# Patient Record
Sex: MALE | Race: WHITE
[De-identification: names, ages, dates, MRNs, and addresses within clinical notes are randomized per-mention and may not be internally consistent; named-entity substitution may affect disease eponyms.]

---

## 2018-01-30 ENCOUNTER — HOSPITAL ENCOUNTER (OUTPATIENT)
Dept: HOSPITAL 25 - OREAST | Age: 63
Discharge: HOME | End: 2018-01-30
Attending: OPHTHALMOLOGY
Payer: COMMERCIAL

## 2018-01-30 VITALS — DIASTOLIC BLOOD PRESSURE: 79 MMHG | SYSTOLIC BLOOD PRESSURE: 142 MMHG

## 2018-01-30 DIAGNOSIS — H25.12: Primary | ICD-10-CM

## 2018-01-30 DIAGNOSIS — I10: ICD-10-CM

## 2018-01-30 DIAGNOSIS — Z87.891: ICD-10-CM

## 2018-01-30 DIAGNOSIS — E78.5: ICD-10-CM

## 2018-01-30 PROCEDURE — V2632 POST CHMBR INTRAOCULAR LENS: HCPCS

## 2018-01-30 NOTE — OP
DATE OF OPERATION/DATE OF DICTATION:  01/30/2018 - [facility]

 

YOB: 1955.

 

SURGEON:  Dr. David Navarro.

 

ASSISTANT:  None.

 

ANESTHESIA:  Topical with intravenous sedation.

 

PRE-OP DIAGNOSIS:  Cataract, left eye.

 

POST-OP DIAGNOSIS:  Cataract, left eye.

 

OPERATIVE PROCEDURE:  Phacoemulsification and cataract extraction with 
posterior chamber intraocular lens implant, left eye.

 

COMPLICATIONS:  None.

 

BLOOD LOSS:  None.

 

DESCRIPTION OF PROCEDURE:  The patient was brought to the operating room and 
received a small amount of intravenous sedation.  A drop of Tetracaine was 
placed in his left eye.  He was prepped and draped in the usual sterile fashion 
for ophthalmic surgery and attention was directed to the left eye where a 
speculum was placed.  A paracentesis was created at the 5 o'clock position and 
0.1 cc of 1 percent preservative-free Lidocaine was injected into the anterior 
chamber followed by DisCoVisc. The eye was digitally stabilized while a 2.75 mm 
keratome was used to create a triplanar clear corneal incision at the 3 o'clock 
position.  A continuous curvilinear capsulorrhexis was created with a cystotome 
and Utrata forceps.  BSS on a cannula was used to hydrodissect the lens from 
the capsule.  Phacoemulsification was performed in a divide-and-conquer 
technique to create four fragments which were removed.  Residual cortical 
material was removed with irrigation and aspiration. DisCoVisc was used to 
inflate the capsular bag and an AUOOTO 16.5 diopter lens was folded and 
inserted into the capsular bag.  DisCoVisc was removed using irrigation and 
aspiration.  BSS on a cannula was used to hydrate the corneal stroma and seal 
the wound.  At the end of the case the pupil was round and the lens was 
centered. The eye was of normal pressure and the wound was water tight.  The 
speculum was removed and topical Maxitrol ointment was placed on the surface of 
the eye.  The eye was closed, patched and shielded and the patient was sent to 
the recovery room in stable condition with post operative instructions and 
follow-up appointment given.

 

 [id number] #: [id number]

MTDD

## 2018-03-07 ENCOUNTER — HOSPITAL ENCOUNTER (INPATIENT)
Dept: HOSPITAL 25 - ED | Age: 63
LOS: 13 days | Discharge: NURSING, REHAB OR LONG-TERM CARE | DRG: 912 | End: 2018-03-20
Attending: HOSPITALIST | Admitting: HOSPITALIST
Payer: COMMERCIAL

## 2018-03-07 DIAGNOSIS — R53.1: ICD-10-CM

## 2018-03-07 DIAGNOSIS — E51.2: ICD-10-CM

## 2018-03-07 DIAGNOSIS — Z98.42: ICD-10-CM

## 2018-03-07 DIAGNOSIS — S01.511A: ICD-10-CM
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
## 2018-03-07 DIAGNOSIS — Z88.8: ICD-10-CM

## 2018-03-07 DIAGNOSIS — S06.5X9A: ICD-10-CM

## 2018-03-07 DIAGNOSIS — M21.332: ICD-10-CM

## 2018-03-07 DIAGNOSIS — I95.9: ICD-10-CM

## 2018-03-07 DIAGNOSIS — M48.02: ICD-10-CM

## 2018-03-07 DIAGNOSIS — S02.80XA: ICD-10-CM

## 2018-03-07 DIAGNOSIS — S02.40DA: ICD-10-CM
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
## 2018-03-07 DIAGNOSIS — I63.9: ICD-10-CM

## 2018-03-07 DIAGNOSIS — S80.211A: ICD-10-CM

## 2018-03-07 DIAGNOSIS — S02.2XXA: ICD-10-CM
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
## 2018-03-07 DIAGNOSIS — Z98.41: ICD-10-CM

## 2018-03-07 DIAGNOSIS — Z96.1: ICD-10-CM

## 2018-03-07 DIAGNOSIS — Z79.82: ICD-10-CM

## 2018-03-07 DIAGNOSIS — S01.81XA: ICD-10-CM

## 2018-03-07 DIAGNOSIS — J96.01: ICD-10-CM

## 2018-03-07 DIAGNOSIS — K72.90: ICD-10-CM

## 2018-03-07 DIAGNOSIS — I10: ICD-10-CM

## 2018-03-07 DIAGNOSIS — M50.31: ICD-10-CM

## 2018-03-07 DIAGNOSIS — Z78.9: ICD-10-CM

## 2018-03-07 DIAGNOSIS — S80.212A: ICD-10-CM
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
## 2018-03-07 DIAGNOSIS — K21.9: ICD-10-CM

## 2018-03-07 DIAGNOSIS — N17.9: ICD-10-CM

## 2018-03-07 DIAGNOSIS — M21.331: ICD-10-CM

## 2018-03-07 DIAGNOSIS — Z87.891: ICD-10-CM

## 2018-03-07 DIAGNOSIS — R19.7: ICD-10-CM

## 2018-03-07 DIAGNOSIS — R91.8: ICD-10-CM

## 2018-03-07 DIAGNOSIS — D72.829: ICD-10-CM

## 2018-03-07 DIAGNOSIS — E78.00: ICD-10-CM

## 2018-03-07 DIAGNOSIS — F10.239: ICD-10-CM

## 2018-03-07 DIAGNOSIS — F41.9: ICD-10-CM

## 2018-03-07 DIAGNOSIS — S14.103A: Primary | ICD-10-CM

## 2018-03-07 DIAGNOSIS — R27.0: ICD-10-CM

## 2018-03-07 DIAGNOSIS — S13.4XXA: ICD-10-CM

## 2018-03-07 PROCEDURE — 93005 ELECTROCARDIOGRAM TRACING: CPT

## 2018-03-07 PROCEDURE — 80307 DRUG TEST PRSMV CHEM ANLYZR: CPT

## 2018-03-07 PROCEDURE — 70486 CT MAXILLOFACIAL W/O DYE: CPT

## 2018-03-07 PROCEDURE — 70030 X-RAY EYE FOR FOREIGN BODY: CPT

## 2018-03-07 PROCEDURE — 70496 CT ANGIOGRAPHY HEAD: CPT

## 2018-03-07 PROCEDURE — 84443 ASSAY THYROID STIM HORMONE: CPT

## 2018-03-07 PROCEDURE — 84484 ASSAY OF TROPONIN QUANT: CPT

## 2018-03-07 PROCEDURE — 71045 X-RAY EXAM CHEST 1 VIEW: CPT

## 2018-03-07 PROCEDURE — G0480 DRUG TEST DEF 1-7 CLASSES: HCPCS

## 2018-03-07 PROCEDURE — 82550 ASSAY OF CK (CPK): CPT

## 2018-03-07 PROCEDURE — 94002 VENT MGMT INPAT INIT DAY: CPT

## 2018-03-07 PROCEDURE — 94760 N-INVAS EAR/PLS OXIMETRY 1: CPT

## 2018-03-07 PROCEDURE — 83735 ASSAY OF MAGNESIUM: CPT

## 2018-03-07 PROCEDURE — 36415 COLL VENOUS BLD VENIPUNCTURE: CPT

## 2018-03-07 PROCEDURE — 36600 WITHDRAWAL OF ARTERIAL BLOOD: CPT

## 2018-03-07 PROCEDURE — 82140 ASSAY OF AMMONIA: CPT

## 2018-03-07 PROCEDURE — 80061 LIPID PANEL: CPT

## 2018-03-07 PROCEDURE — 82542 COL CHROMOTOGRAPHY QUAL/QUAN: CPT

## 2018-03-07 PROCEDURE — 80320 DRUG SCREEN QUANTALCOHOLS: CPT

## 2018-03-07 PROCEDURE — 70551 MRI BRAIN STEM W/O DYE: CPT

## 2018-03-07 PROCEDURE — 74018 RADEX ABDOMEN 1 VIEW: CPT

## 2018-03-07 PROCEDURE — 83605 ASSAY OF LACTIC ACID: CPT

## 2018-03-07 PROCEDURE — 70498 CT ANGIOGRAPHY NECK: CPT

## 2018-03-07 PROCEDURE — 84207 ASSAY OF VITAMIN B-6: CPT

## 2018-03-07 PROCEDURE — 85025 COMPLETE CBC W/AUTO DIFF WBC: CPT

## 2018-03-07 PROCEDURE — 90715 TDAP VACCINE 7 YRS/> IM: CPT

## 2018-03-07 PROCEDURE — 87086 URINE CULTURE/COLONY COUNT: CPT

## 2018-03-07 PROCEDURE — 84100 ASSAY OF PHOSPHORUS: CPT

## 2018-03-07 PROCEDURE — 82803 BLOOD GASES ANY COMBINATION: CPT

## 2018-03-07 PROCEDURE — 80048 BASIC METABOLIC PNL TOTAL CA: CPT

## 2018-03-07 PROCEDURE — 95819 EEG AWAKE AND ASLEEP: CPT

## 2018-03-07 PROCEDURE — 93306 TTE W/DOPPLER COMPLETE: CPT

## 2018-03-07 PROCEDURE — 99285 EMERGENCY DEPT VISIT HI MDM: CPT

## 2018-03-07 PROCEDURE — 82150 ASSAY OF AMYLASE: CPT

## 2018-03-07 PROCEDURE — 70450 CT HEAD/BRAIN W/O DYE: CPT

## 2018-03-07 PROCEDURE — 72170 X-RAY EXAM OF PELVIS: CPT

## 2018-03-07 PROCEDURE — 85652 RBC SED RATE AUTOMATED: CPT

## 2018-03-07 PROCEDURE — 81003 URINALYSIS AUTO W/O SCOPE: CPT

## 2018-03-07 PROCEDURE — 85610 PROTHROMBIN TIME: CPT

## 2018-03-07 PROCEDURE — 80053 COMPREHEN METABOLIC PANEL: CPT

## 2018-03-07 PROCEDURE — 85027 COMPLETE CBC AUTOMATED: CPT

## 2018-03-07 PROCEDURE — 81015 MICROSCOPIC EXAM OF URINE: CPT

## 2018-03-07 PROCEDURE — 94762 N-INVAS EAR/PLS OXIMTRY CONT: CPT

## 2018-03-07 PROCEDURE — 94003 VENT MGMT INPAT SUBQ DAY: CPT

## 2018-03-07 PROCEDURE — 82607 VITAMIN B-12: CPT

## 2018-03-07 PROCEDURE — 72141 MRI NECK SPINE W/O DYE: CPT

## 2018-03-07 PROCEDURE — 80076 HEPATIC FUNCTION PANEL: CPT

## 2018-03-07 PROCEDURE — 72125 CT NECK SPINE W/O DYE: CPT

## 2018-03-08 LAB
BASOPHILS # BLD AUTO: 0 10^3/UL (ref 0–0.2)
EOSINOPHIL # BLD AUTO: 0.1 10^3/UL (ref 0–0.6)
HCT VFR BLD AUTO: 44 % (ref 42–52)
HGB BLD-MCNC: 15.3 G/DL (ref 14–18)
INR PPP/BLD: 0.97 (ref 0.77–1.02)
LYMPHOCYTES # BLD AUTO: 1 10^3/UL (ref 1–4.8)
MCH RBC QN AUTO: 35 PG (ref 27–31)
MCHC RBC AUTO-ENTMCNC: 35 G/DL (ref 31–36)
MCV RBC AUTO: 100 FL (ref 80–94)
MONOCYTES # BLD AUTO: 0.9 10^3/UL (ref 0–0.8)
NEUTROPHILS # BLD AUTO: 13.3 10^3/UL (ref 1.5–7.7)
NRBC # BLD AUTO: 0 10^3/UL
NRBC BLD QL AUTO: 0
PLATELET # BLD AUTO: 347 10^3/UL (ref 150–450)
RBC # BLD AUTO: 4.41 10^6/UL (ref 4–5.4)
WBC # BLD AUTO: 15.3 10^3/UL (ref 3.5–10.8)

## 2018-03-08 PROCEDURE — 0CQ1XZZ REPAIR LOWER LIP, EXTERNAL APPROACH: ICD-10-PCS | Performed by: EMERGENCY MEDICINE

## 2018-03-08 PROCEDURE — 0WQ2XZZ REPAIR FACE, EXTERNAL APPROACH: ICD-10-PCS | Performed by: EMERGENCY MEDICINE

## 2018-03-08 RX ADMIN — ATORVASTATIN CALCIUM SCH MG: 10 TABLET, FILM COATED ORAL at 21:20

## 2018-03-08 RX ADMIN — ENOXAPARIN SODIUM SCH MG: 40 INJECTION SUBCUTANEOUS at 09:47

## 2018-03-08 RX ADMIN — METOPROLOL SUCCINATE SCH MG: 50 TABLET, EXTENDED RELEASE ORAL at 21:20

## 2018-03-08 RX ADMIN — LORAZEPAM SCH MG: 1 TABLET ORAL at 16:48

## 2018-03-08 RX ADMIN — BUPROPION HYDROCHLORIDE SCH MG: 100 TABLET, FILM COATED, EXTENDED RELEASE ORAL at 09:45

## 2018-03-08 RX ADMIN — OXYCODONE HYDROCHLORIDE AND ACETAMINOPHEN SCH MG: 500 TABLET ORAL at 09:45

## 2018-03-08 RX ADMIN — LORAZEPAM SCH MG: 1 TABLET ORAL at 23:46

## 2018-03-08 RX ADMIN — DOCUSATE SODIUM SCH MG: 100 CAPSULE, LIQUID FILLED ORAL at 21:20

## 2018-03-08 RX ADMIN — MORPHINE SULFATE PRN MG: 2 INJECTION, SOLUTION INTRAMUSCULAR; INTRAVENOUS at 21:59

## 2018-03-08 RX ADMIN — SODIUM CHLORIDE SCH MLS/HR: 900 IRRIGANT IRRIGATION at 09:47

## 2018-03-08 RX ADMIN — VALSARTAN SCH MG: 160 TABLET ORAL at 09:45

## 2018-03-08 RX ADMIN — OMEPRAZOLE SCH MG: 20 CAPSULE, DELAYED RELEASE ORAL at 09:45

## 2018-03-08 RX ADMIN — ASPIRIN SCH MG: 81 TABLET, COATED ORAL at 09:45

## 2018-03-08 RX ADMIN — THIAMINE HYDROCHLORIDE SCH MLS/HR: 100 INJECTION, SOLUTION INTRAMUSCULAR; INTRAVENOUS at 19:21

## 2018-03-08 RX ADMIN — SENNOSIDES SCH TAB: 8.6 TABLET, FILM COATED ORAL at 09:45

## 2018-03-08 RX ADMIN — LORAZEPAM SCH MG: 1 TABLET ORAL at 09:45

## 2018-03-08 RX ADMIN — SENNOSIDES SCH TAB: 8.6 TABLET, FILM COATED ORAL at 21:20

## 2018-03-08 RX ADMIN — DOCUSATE SODIUM SCH MG: 100 CAPSULE, LIQUID FILLED ORAL at 09:45

## 2018-03-08 RX ADMIN — MORPHINE SULFATE PRN MG: 2 INJECTION, SOLUTION INTRAMUSCULAR; INTRAVENOUS at 15:21

## 2018-03-08 RX ADMIN — SODIUM CHLORIDE SCH MLS/HR: 900 IRRIGANT IRRIGATION at 16:55

## 2018-03-08 NOTE — ED
Zach MARX Stephanie, scribed for Monica Contreras MD on 03/08/18 at 0627 .





Progress





- Progress Note


Progress Note: 


Xray L shoulder: no fracture. Bilateral Knee XRay: no fracture. R clavicle XRay

: no fracture. Pelvis XRay: no fracture. CXR reveals 22 mm *15 mm mass in the 

left lower lobe. Pending official reading. 





At 06:24:


Pt was helped by staff from bed to wheelchair. The pt is tremulous and had 

trouble ambulating.The pt was returned into the stretcher and re-evaluated. His 

neuro exam is intact on the stretcher except for L UE dysmetria. Pt will be 

admitted for further evaluation. ED physician discussed with Dr. Ralph who 

accepts the pt for admission.





- Results/Orders


Results/Orders: 


CT Maxillofacial reveals:Mildly depressed acute appearing nasal bridge 

fracture. Questionable old left maxillary sinus fracture. Dense left maxillary 

sinus contents could represent blood from trauma, or chronic or fungal 

sinusitis.











Re-Evaluation





- Re-Evaluation


  ** First Eval


Change: Unchanged - no change  in assessment, patient is in CT scan, case 

reviewed with Dr. Contreras





  ** Second Eval


Change: Unchanged - Patient at 30 in bed complaining of left shoulder pain 

Zofran and 2 mg morphine given report given to Dr. contreras





Course/Dx





- Course


Course Of Treatment: Procedure note:  Lower lip laceration repair:Pt has 3 cm 

long 1.5 cm wide lac at the middle of the mucosa of the lower lip extending 

from the vrmilian boarder to the gum line. Repair using lidocain 2 % with epi. 

The laceration was closed in two layers. Inner layer was closed with 4 

stitches. The outer layer was closed with 4 stitches. We did use Polizorb 4-0 

and 3-0. There was a good alignment and hemostasis.  Forehead laceration: about 

3 cm lac verticle in the middle of the forehead.  Repair: Closed in 2 layers. 

Inner layer closed using polizorb 3-0. Outer layer closed using proline 4-0. 

Inner layer had 3 stitches. Outerlayer had 6 stitches. Good alignment and 

hemostasis.  ED physician reviewed the pt's his normal xray results. ED 

physician explained finding of CXR which showed a mass according to the 

reading. The pt was advised to folow up with his PCP to address the lung mass 

and to make sure to rule out malignancy. The pt understands and agrees. 

Disposition: discharge, Condition: Stable





- Diagnoses


Provider Diagnoses: 


 Nasal fracture, Trauma, Lip laceration, Forehead laceration, Multiple 

contusions, Lung mass, difficulty ambulating








The documentation as recorded by the Zach cervantes Stephanie accurately reflects 

the service I personally performed and the decisions made by me, Monica Contreras MD.

## 2018-03-08 NOTE — RAD
Indication: LEFT shoulder pain post fall.



Comparison: October 01, 2008 chest radiograph.



Technique: Internal rotation AP, external rotation Grashey, scapular Y, axillary views

LEFT shoulder



Report: Normal acromioclavicular and glenohumeral joint alignment. Negative for fracture.

Mild osteophytosis and subchondral sclerosis as well as capsular hypertrophy at the

acromioclavicular joint. Unremarkable soft tissue contours.



Multiple healed LEFT rib fractures noted.



IMPRESSION: No radiographic evidence for traumatic LEFT shoulder injury. Mild

acromioclavicular joint osteoarthritis.
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
## 2018-03-08 NOTE — RAD
INDICATION:  Trauma.



COMPARISON:  There are no prior studies available for comparison.



TECHNIQUE: Contiguous axial sections were obtained from the skull base through the T2

vertebra. Images were reconstructed in the sagittal and coronal planes.



FINDINGS: There is stranding of the cervical spine. There is mild retrolisthesis of C3

relative to C4, C4 relative to C5 which appears to be degenerative in origin. No fracture

is seen. No prevertebral soft tissue swelling is noted.



At the C2-C3 level there is a small central disc protrusion. No significant spinal canal

or neural foraminal narrowing is seen.



At the C3-C4 level there is posterior uncinate process spurring. There is mild to moderate

spinal canal narrowing and moderate bilateral neural foraminal narrowing.



At the C4-C5 level there is moderate to severe posterior uncinate process spurring and

hypertrophic changes within the facet joints. There is moderate to severe spinal canal

narrowing and moderate to severe bilateral neural foraminal narrowing.



At C5-C6 level there is moderate posterior uncinate process spurring causing moderate

spinal canal narrowing. There is moderate to severe bilateral neural foraminal narrowing.



At the C6-C7 level there is mild-to-moderate posterior uncinate process spurring. There is

mild spinal canal narrowing and mild to moderate bilateral neural foraminal narrowing.



IMPRESSION:  

1. NO EVIDENCE FOR FRACTURE.

2. MODERATE TO SEVERE CERVICAL SPONDYLOSIS.

## 2018-03-08 NOTE — RAD
HISTORY: Fall, injury right clavicle



COMPARISONS: None



VIEWS: 2, frontal and frontal oblique views of the right clavicle    



FINDINGS:



BONE DENSITY: Normal.

BONES: There is no displaced fracture.    

JOINTS: There is mild osteoarthritis of the right AC joint.    

ALIGNMENT: There is no dislocation. 

SOFT TISSUES: Unremarkable.



OTHER FINDINGS: Degenerative changes are noted of the cervical spine.



IMPRESSION: 

NO ACUTE OSSEOUS INJURY. IF SYMPTOMS PERSIST, RECOMMEND REPEAT IMAGING.

## 2018-03-08 NOTE — RAD
INDICATION:  Facial trauma.



COMPARISON:  There are no prior studies available for comparison.



TECHNIQUE: Contiguous axial sections of the axial images of the facial bones were obtained

and reconstructed in the coronal and sagittal planes.



FINDINGS: There is soft tissue swelling and air within the scalp anterior to the frontal

bones most consistent with a laceration type injury.



The walls of the orbits appear intact. There is a fracture of the posterior lateral wall

of the left maxillary sinus, age indeterminate.  The zygomatic arches appear intact. 

There is no evidence for a fracture of the mandible.



There appears to be a nondisplaced fracture of the nasal bridge, age indeterminate. The

nose is deviated toward the left side. There is moderate to severe deviation of the nasal

septum toward the right side. The pterygoid plates appear intact.



There is mucosal thickening within the right maxillary and ethmoid air cells and near

complete opacification of the left maxillary sinus.



There is periapical disease involving multiple teeth in the maxilla and mandible.



IMPRESSION:  

1. SOFT TISSUE SWELLING AND AIR IN THE SCALP ANTERIOR TO THE FRONTAL BONES CONSISTENT WITH

A LACERATION TYPE INJURY.

2. NONDISPLACED FRACTURE OF THE NASAL BRIDGE, AGE INDETERMINATE.

3. NONDISPLACED FRACTURE OF THE POSTERIOR LATERAL WALL OF THE LEFT MAXILLARY SINUS, AGE

INDETERMINATE.

4. DIFFUSE DENTAL DISEASE.

## 2018-03-08 NOTE — RAD
INDICATION:  Pain after a fall



TECHNIQUE: An AP view of the pelvis was obtained.



FINDINGS:  The bones are in normal alignment. No fracture is seen. 

Joint spaces appear maintained.



IMPRESSION:  NO EVIDENCE FOR FRACTURE.



 IF THE PATIENT'S SYMPTOMS PERSIST RECOMMEND FOLLOW-UP IMAGING.
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
## 2018-03-08 NOTE — RAD
INDICATION: Bilateral knee pain after a fall



COMPARISON: None

 

TECHNIQUE: 4 view radiograph of each knee.



FINDINGS: 



The visualized bones are well-corticated and properly aligned. The joint spaces are

properly maintained. There is no radiographic evidence of joint effusion. There is no

acute fracture, dislocation or other focal bony abnormality.



IMPRESSION:  Normal radiograph of the bilateral knees as described above. 



If the patient's symptoms persist, follow-up imaging is recommended.

## 2018-03-08 NOTE — RAD
INDICATION: Fall



COMPARISON: Chest x-ray dated October 01, 2008

 

TECHNIQUE: Single AP view of the chest was obtained.



FINDINGS: 



The heart and mediastinum exhibit normal size and contour.



The lungs are grossly clear. There is no evidence of a large pleural effusion.



There are chronic appearing left-sided rib fractures.



IMPRESSION:  No radiographic evidence for acute cardiopulmonary abnormality on this single

AP view chest x-ray.

## 2018-03-09 LAB
BASOPHILS # BLD AUTO: 0.1 10^3/UL (ref 0–0.2)
EOSINOPHIL # BLD AUTO: 0 10^3/UL (ref 0–0.6)
HCT VFR BLD AUTO: 38 % (ref 42–52)
HGB BLD-MCNC: 12.8 G/DL (ref 14–18)
LYMPHOCYTES # BLD AUTO: 1.6 10^3/UL (ref 1–4.8)
MCH RBC QN AUTO: 34 PG (ref 27–31)
MCHC RBC AUTO-ENTMCNC: 34 G/DL (ref 31–36)
MCV RBC AUTO: 99 FL (ref 80–94)
MONOCYTES # BLD AUTO: 1.3 10^3/UL (ref 0–0.8)
NEUTROPHILS # BLD AUTO: 9.3 10^3/UL (ref 1.5–7.7)
NRBC # BLD AUTO: 0 10^3/UL
NRBC BLD QL AUTO: 0
PLATELET # BLD AUTO: 284 10^3/UL (ref 150–450)
RBC # BLD AUTO: 3.82 10^6/UL (ref 4–5.4)
WBC # BLD AUTO: 12.3 10^3/UL (ref 3.5–10.8)

## 2018-03-09 RX ADMIN — LORAZEPAM SCH MG: 2 INJECTION INTRAMUSCULAR; INTRAVENOUS at 17:57

## 2018-03-09 RX ADMIN — VALSARTAN SCH MG: 160 TABLET ORAL at 08:44

## 2018-03-09 RX ADMIN — SODIUM CHLORIDE SCH MLS/HR: 900 IRRIGANT IRRIGATION at 17:35

## 2018-03-09 RX ADMIN — LORAZEPAM SCH MG: 2 INJECTION INTRAMUSCULAR; INTRAVENOUS at 21:52

## 2018-03-09 RX ADMIN — THIAMINE HYDROCHLORIDE SCH MLS/HR: 100 INJECTION, SOLUTION INTRAMUSCULAR; INTRAVENOUS at 21:58

## 2018-03-09 RX ADMIN — SODIUM CHLORIDE SCH MLS/HR: 900 IRRIGANT IRRIGATION at 01:12

## 2018-03-09 RX ADMIN — LORAZEPAM SCH MG: 1 TABLET ORAL at 12:03

## 2018-03-09 RX ADMIN — LORAZEPAM SCH MG: 2 INJECTION INTRAMUSCULAR; INTRAVENOUS at 16:26

## 2018-03-09 RX ADMIN — DOCUSATE SODIUM SCH MG: 100 CAPSULE, LIQUID FILLED ORAL at 20:35

## 2018-03-09 RX ADMIN — LORAZEPAM SCH MG: 2 INJECTION INTRAMUSCULAR; INTRAVENOUS at 14:09

## 2018-03-09 RX ADMIN — SENNOSIDES SCH TAB: 8.6 TABLET, FILM COATED ORAL at 08:44

## 2018-03-09 RX ADMIN — ASPIRIN SCH MG: 81 TABLET, COATED ORAL at 08:44

## 2018-03-09 RX ADMIN — SENNOSIDES SCH TAB: 8.6 TABLET, FILM COATED ORAL at 20:35

## 2018-03-09 RX ADMIN — METOPROLOL SUCCINATE SCH MG: 50 TABLET, EXTENDED RELEASE ORAL at 20:34

## 2018-03-09 RX ADMIN — THERA TABS SCH TAB: TAB at 08:44

## 2018-03-09 RX ADMIN — THIAMINE HYDROCHLORIDE SCH MLS/HR: 100 INJECTION, SOLUTION INTRAMUSCULAR; INTRAVENOUS at 03:22

## 2018-03-09 RX ADMIN — LORAZEPAM SCH MG: 2 INJECTION INTRAMUSCULAR; INTRAVENOUS at 12:01

## 2018-03-09 RX ADMIN — THIAMINE HYDROCHLORIDE SCH MLS/HR: 100 INJECTION, SOLUTION INTRAMUSCULAR; INTRAVENOUS at 10:45

## 2018-03-09 RX ADMIN — OXYCODONE HYDROCHLORIDE AND ACETAMINOPHEN SCH MG: 500 TABLET ORAL at 08:44

## 2018-03-09 RX ADMIN — BUPROPION HYDROCHLORIDE SCH MG: 100 TABLET, FILM COATED, EXTENDED RELEASE ORAL at 08:44

## 2018-03-09 RX ADMIN — OMEPRAZOLE SCH MG: 20 CAPSULE, DELAYED RELEASE ORAL at 08:44

## 2018-03-09 RX ADMIN — DOCUSATE SODIUM SCH MG: 100 CAPSULE, LIQUID FILLED ORAL at 08:44

## 2018-03-09 RX ADMIN — ATORVASTATIN CALCIUM SCH MG: 10 TABLET, FILM COATED ORAL at 20:35

## 2018-03-09 RX ADMIN — LORAZEPAM SCH MG: 2 INJECTION INTRAMUSCULAR; INTRAVENOUS at 08:44

## 2018-03-09 RX ADMIN — SODIUM CHLORIDE SCH MLS/HR: 900 IRRIGANT IRRIGATION at 09:18

## 2018-03-09 RX ADMIN — ENOXAPARIN SODIUM SCH MG: 40 INJECTION SUBCUTANEOUS at 08:44

## 2018-03-09 RX ADMIN — MORPHINE SULFATE PRN MG: 2 INJECTION, SOLUTION INTRAMUSCULAR; INTRAVENOUS at 13:23

## 2018-03-09 RX ADMIN — FOLIC ACID SCH MG: 1 TABLET ORAL at 08:44

## 2018-03-09 RX ADMIN — WATER SCH NOTE: 100 INJECTION, SOLUTION INTRAVENOUS at 20:37

## 2018-03-10 LAB
BASOPHILS # BLD AUTO: 0 10^3/UL (ref 0–0.2)
EOSINOPHIL # BLD AUTO: 0 10^3/UL (ref 0–0.6)
HCT VFR BLD AUTO: 38 % (ref 42–52)
HGB BLD-MCNC: 13.3 G/DL (ref 14–18)
LYMPHOCYTES # BLD AUTO: 1.1 10^3/UL (ref 1–4.8)
MCH RBC QN AUTO: 35 PG (ref 27–31)
MCHC RBC AUTO-ENTMCNC: 35 G/DL (ref 31–36)
MCV RBC AUTO: 100 FL (ref 80–94)
MONOCYTES # BLD AUTO: 1.1 10^3/UL (ref 0–0.8)
NEUTROPHILS # BLD AUTO: 8 10^3/UL (ref 1.5–7.7)
NRBC # BLD AUTO: 0 10^3/UL
NRBC BLD QL AUTO: 0
PLATELET # BLD AUTO: 264 10^3/UL (ref 150–450)
RBC # BLD AUTO: 3.83 10^6/UL (ref 4–5.4)
WBC # BLD AUTO: 10.2 10^3/UL (ref 3.5–10.8)

## 2018-03-10 RX ADMIN — LORAZEPAM SCH MG: 2 INJECTION INTRAMUSCULAR; INTRAVENOUS at 16:40

## 2018-03-10 RX ADMIN — FOLIC ACID SCH MG: 1 TABLET ORAL at 09:06

## 2018-03-10 RX ADMIN — LORAZEPAM SCH MG: 2 INJECTION INTRAMUSCULAR; INTRAVENOUS at 20:48

## 2018-03-10 RX ADMIN — METOPROLOL SUCCINATE SCH: 50 TABLET, EXTENDED RELEASE ORAL at 00:39

## 2018-03-10 RX ADMIN — LORAZEPAM SCH MG: 1 TABLET ORAL at 01:52

## 2018-03-10 RX ADMIN — THERA TABS SCH TAB: TAB at 09:05

## 2018-03-10 RX ADMIN — LORAZEPAM SCH MG: 1 TABLET ORAL at 12:14

## 2018-03-10 RX ADMIN — SENNOSIDES SCH: 8.6 TABLET, FILM COATED ORAL at 00:39

## 2018-03-10 RX ADMIN — LORAZEPAM SCH MG: 2 INJECTION INTRAMUSCULAR; INTRAVENOUS at 10:45

## 2018-03-10 RX ADMIN — METOPROLOL SUCCINATE SCH MG: 50 TABLET, EXTENDED RELEASE ORAL at 20:13

## 2018-03-10 RX ADMIN — SODIUM CHLORIDE SCH MLS/HR: 900 IRRIGANT IRRIGATION at 01:58

## 2018-03-10 RX ADMIN — DOCUSATE SODIUM SCH MG: 100 CAPSULE, LIQUID FILLED ORAL at 10:09

## 2018-03-10 RX ADMIN — DOCUSATE SODIUM SCH: 100 CAPSULE, LIQUID FILLED ORAL at 00:38

## 2018-03-10 RX ADMIN — LORAZEPAM SCH MG: 1 TABLET ORAL at 19:31

## 2018-03-10 RX ADMIN — LORAZEPAM SCH MG: 2 INJECTION INTRAMUSCULAR; INTRAVENOUS at 12:21

## 2018-03-10 RX ADMIN — WATER SCH NOTE: 100 INJECTION, SOLUTION INTRAVENOUS at 20:19

## 2018-03-10 RX ADMIN — LORAZEPAM SCH MG: 2 INJECTION INTRAMUSCULAR; INTRAVENOUS at 22:35

## 2018-03-10 RX ADMIN — SODIUM CHLORIDE SCH MLS/HR: 900 IRRIGANT IRRIGATION at 10:04

## 2018-03-10 RX ADMIN — SENNOSIDES SCH TAB: 8.6 TABLET, FILM COATED ORAL at 10:09

## 2018-03-10 RX ADMIN — THIAMINE HYDROCHLORIDE SCH MLS/HR: 100 INJECTION, SOLUTION INTRAMUSCULAR; INTRAVENOUS at 20:09

## 2018-03-10 RX ADMIN — THIAMINE HYDROCHLORIDE SCH MLS/HR: 100 INJECTION, SOLUTION INTRAMUSCULAR; INTRAVENOUS at 12:13

## 2018-03-10 RX ADMIN — LORAZEPAM SCH MG: 2 INJECTION INTRAMUSCULAR; INTRAVENOUS at 14:16

## 2018-03-10 RX ADMIN — LORAZEPAM SCH MG: 2 INJECTION INTRAMUSCULAR; INTRAVENOUS at 00:36

## 2018-03-10 RX ADMIN — ENOXAPARIN SODIUM SCH MG: 40 INJECTION SUBCUTANEOUS at 09:07

## 2018-03-10 RX ADMIN — THIAMINE HYDROCHLORIDE SCH MLS/HR: 100 INJECTION, SOLUTION INTRAMUSCULAR; INTRAVENOUS at 03:29

## 2018-03-10 RX ADMIN — NICOTINE SCH PATCH: 21 PATCH TRANSDERMAL at 09:03

## 2018-03-10 RX ADMIN — BUPROPION HYDROCHLORIDE SCH MG: 100 TABLET, FILM COATED, EXTENDED RELEASE ORAL at 10:09

## 2018-03-10 RX ADMIN — ATORVASTATIN CALCIUM SCH MG: 10 TABLET, FILM COATED ORAL at 20:14

## 2018-03-10 RX ADMIN — LORAZEPAM SCH MG: 2 INJECTION INTRAMUSCULAR; INTRAVENOUS at 04:44

## 2018-03-10 RX ADMIN — ATORVASTATIN CALCIUM SCH: 10 TABLET, FILM COATED ORAL at 00:39

## 2018-03-10 RX ADMIN — OMEPRAZOLE SCH MG: 20 CAPSULE, DELAYED RELEASE ORAL at 09:06

## 2018-03-10 RX ADMIN — SENNOSIDES SCH TAB: 8.6 TABLET, FILM COATED ORAL at 20:15

## 2018-03-10 RX ADMIN — LORAZEPAM SCH MG: 2 INJECTION INTRAMUSCULAR; INTRAVENOUS at 09:05

## 2018-03-10 RX ADMIN — DOCUSATE SODIUM SCH MG: 100 CAPSULE, LIQUID FILLED ORAL at 20:14

## 2018-03-10 RX ADMIN — VALSARTAN SCH MG: 160 TABLET ORAL at 09:06

## 2018-03-10 RX ADMIN — ASPIRIN SCH MG: 81 TABLET, COATED ORAL at 09:06

## 2018-03-10 RX ADMIN — LORAZEPAM SCH MG: 2 INJECTION INTRAMUSCULAR; INTRAVENOUS at 02:46

## 2018-03-10 RX ADMIN — OXYCODONE HYDROCHLORIDE AND ACETAMINOPHEN SCH MG: 500 TABLET ORAL at 09:06

## 2018-03-10 NOTE — PN
Hospitalist Progress Note


Date of Service: 03/10/18





CT head reviewed; subacute cerebellar CVA noted.  I discussed the case with Dr. Cotton, who recommended a CTA head/neck, a TTE, a lipid profile, and a daily 

aspirin.  Continue telemetry.

## 2018-03-10 NOTE — PN
Subjective


Date of Service: 03/10/18


Interval History: 





received iv geodon overnight.  still requiring ativan IV per [facility] protocol.  his 

brother is at the bedside today.  Hebert recognizes him.  He also recognizes me.  

he says he feels good today, just shaky.  no hallucinations, no pain, no anxiety

, nausea, or vomiting.


Family History: Unchanged from Admission


Social History: Unchanged from Admission


Past Medical History: Unchanged from Admission





Objective


Active Medications: 








Al Hydrox/Mg Hydrox/Simethicone (Maalox Plus*)  30 ml PO Q6H PRN


   PRN Reason: INDIGESTION


Ascorbic Acid (Vitamin C  Tab*)  500 mg PO QAM [facility]


   Last Admin: 03/10/18 09:06 Dose:  500 mg


Aspirin (Aspirin Ec Low Dose*)  81 mg PO DAILY [facility]


   Last Admin: 03/10/18 09:06 Dose:  81 mg


Atorvastatin Calcium (Lipitor*)  10 mg PO BEDTIME [facility]


   Last Admin: 03/10/18 00:39 Dose:  Not Given


Bupropion HCl (Wellbutrin Sr Tab*)  100 mg PO QAM [facility]


   Last Admin: 03/10/18 10:09 Dose:  100 mg


Device (Nicotine Mouth Piece*)  1 each INH .USE WITH NICOTROL PRN


   PRN Reason: CRAVING


Docusate Sodium (Colace Cap*)  100 mg PO BID [facility]


   Last Admin: 03/10/18 10:09 Dose:  100 mg


Enoxaparin Sodium (Lovenox(*))  40 mg SUBCUT Q24H [facility]


   Last Admin: 03/10/18 09:07 Dose:  40 mg


Folic Acid (Folvite Tab*)  1 mg PO DAILY [facility]


   Last Admin: 03/10/18 09:06 Dose:  1 mg


Sodium Chloride (Ns 0.9% 1000 Ml*)  1,000 mls @ 150 mls/hr IV PER RATE [facility]


   Last Admin: 03/10/18 10:04 Dose:  150 mls/hr


Thiamine HCl 500 mg/ Sodium (Chloride)  255 mls @ 255 mls/hr IV Q8H [facility]


   Last Admin: 03/10/18 12:13 Dose:  255 mls/hr


Lorazepam (Ativan Tab(*))  1 mg PO Q12H [facility]


   PRN Reason: Taper


   Stop: 03/11/18 04:59


   Last Admin: 03/10/18 12:14 Dose:  1 mg


Lorazepam (Ativan Inj*)  0 - 6 mg IV PUSH .PER [facility] PROTOCOL [facility]


   PRN Reason: Protocol


   Last Admin: 03/10/18 14:16 Dose:  2 mg


Metoprolol Succinate (Toprol Xl Tab*)  50 mg PO BEDTIME [facility]


   Last Admin: 03/10/18 00:39 Dose:  Not Given


Morphine Sulfate (Morphine Inj (Syringe)*)  2 mg IV Q4H PRN


   PRN Reason: PAIN


   Last Admin: 03/09/18 13:23 Dose:  2 mg


Multivitamins/Minerals (Theragran/Minerals Tab*)  1 tab PO DAILY [facility]


   Last Admin: 03/10/18 09:05 Dose:  1 tab


Nicotine (Nicotine Inhaler*)  10 mg INH Q2H PRN


   PRN Reason: CRAVING


Nicotine (Nicotine Patch 21 Mg/24 Hr*)  1 patch TRANSDERM DAILY@0800 [facility]


   Last Admin: 03/10/18 09:03 Dose:  1 patch


Omeprazole (Prilosec Cap*)  20 mg PO DAILY [facility]


   Last Admin: 03/10/18 09:06 Dose:  20 mg


Ondansetron HCl (Zofran Inj*)  4 mg IV Q4H PRN


   PRN Reason: NAUSEA/VOMITING


Pharmacy Profile Note (Nicotine Patch Removal Note*)  1 note FOLLOW UP 2100 [facility]


   Last Admin: 03/09/18 20:37 Dose:  1 note


Senna (Senokot Tab*)  1 tab PO BID [facility]


   Last Admin: 03/10/18 10:09 Dose:  1 tab


Valsartan (Diovan Tab*)  160 mg PO QAM [facility]


   Last Admin: 03/10/18 09:06 Dose:  160 mg








 Vital Signs - 8 hr











  03/10/18 03/10/18 03/10/18





  09:05 09:58 10:11


 


Temperature  98.0 F 


 


Pulse Rate  86 


 


Respiratory 20 20 20





Rate   


 


Blood Pressure  139/104 





(mmHg)   


 


O2 Sat by Pulse  100 





Oximetry   














  03/10/18 03/10/18 03/10/18





  10:45 12:00 12:12


 


Temperature   99.0 F


 


Pulse Rate   87


 


Respiratory 20 20 16





Rate   


 


Blood Pressure   158/84





(mmHg)   


 


O2 Sat by Pulse   100





Oximetry   














  03/10/18 03/10/18 03/10/18





  12:14 12:21 14:16


 


Temperature   


 


Pulse Rate   


 


Respiratory 18 20 20





Rate   


 


Blood Pressure   





(mmHg)   


 


O2 Sat by Pulse   





Oximetry   














  03/10/18 03/10/18





  14:20 15:21


 


Temperature  


 


Pulse Rate  


 


Respiratory 20 20





Rate  


 


Blood Pressure  





(mmHg)  


 


O2 Sat by Pulse  





Oximetry  











Oxygen Devices in Use Now: Nasal Cannula


Appearance: alert, ill-appearing, dishevled, very tremulous, dried blood around 

mouth


Eyes: - - injected


Ears/Nose/Mouth/Throat: - - sutures inside lower lip and forehead


Neck: NL Appearance and Movements; NL JVP


Respiratory: Symmetrical Chest Expansion and Respiratory Effort


Cardiovascular: NL Sounds; No Murmurs; No JVD, RRR


Abdominal: NL Sounds; No Tenderness; No Distention


Lymphatic: No Cervical Adenopathy


Extremities: - - tremulous diffusely


Neurological: - - unable to test gait today due to instability 


Result Diagrams: 


 03/10/18 05:21





 03/09/18 04:52


Additional Lab and Data: 


 








Assess/Plan/Problems-Billing


Assessment: 





Mr. Fisher is a 61 yo male with PMH significant for HTN and alcohol abuse who 

presented to the emergency room with complaints of a fall down stair and 

difficulty with ambulation.











- Patient Problems


(1) Wernicke encephalopathy


Current Visit: Yes   Status: Acute   Code(s): E51.2 - WERNICKE'S ENCEPHALOPATHY

   SNOMED Code(s): 17870922


   Comment: continue IV thiamine   





(2) Alcohol withdrawal


Current Visit: Yes   Status: Acute   Code(s): F10.239 - ALCOHOL DEPENDENCE WITH 

WITHDRAWAL, UNSPECIFIED   SNOMED Code(s): 709666337


   Comment: Continue ativan taper, PRN ativan, thiamine, folate and multivitamin


Social work consult when he is more alert and able to discuss rehab plans


   





(3) Facial fracture


Current Visit: Yes   Status: Acute   Code(s): S02.92XA - UNSP FRACTURE OF 

FACIAL BONES, INIT FOR CLOS FX   SNOMED Code(s): 95542954


   Comment: Nondisplaced fracture of the nasal bridge and nondisplaced fracture 

of the posterior lateral wall of the left maxilary sinus


Follow up with ENT outpatient next week (Dr. Van's office will call 4S 

on Monday to see if Pt is still here)   





(4) Gait abnormality


Current Visit: Yes   Status: Acute   Code(s): R26.9 - UNSPECIFIED ABNORMALITIES 

OF GAIT AND MOBILITY   SNOMED Code(s): 81955049


   Comment: Unable to have an MRI due to metallic FB in left orbit, so repeat 

CT head today to rule out SDH


Suspect Wernicke encephalopathy, so treating as above


Evaluated by PT; appreciate ongoing input


Neuro checks Q4H


   


Status and Disposition: 





Inpatient.

## 2018-03-11 LAB
BASOPHILS # BLD AUTO: 0 10^3/UL (ref 0–0.2)
EOSINOPHIL # BLD AUTO: 0 10^3/UL (ref 0–0.6)
HCT VFR BLD AUTO: 41 % (ref 42–52)
HGB BLD-MCNC: 13.8 G/DL (ref 14–18)
LYMPHOCYTES # BLD AUTO: 1 10^3/UL (ref 1–4.8)
MCH RBC QN AUTO: 34 PG (ref 27–31)
MCHC RBC AUTO-ENTMCNC: 34 G/DL (ref 31–36)
MCV RBC AUTO: 99 FL (ref 80–94)
MONOCYTES # BLD AUTO: 1.3 10^3/UL (ref 0–0.8)
NEUTROPHILS # BLD AUTO: 8.8 10^3/UL (ref 1.5–7.7)
NRBC # BLD AUTO: 0 10^3/UL
NRBC BLD QL AUTO: 0
PLATELET # BLD AUTO: 299 10^3/UL (ref 150–450)
RBC # BLD AUTO: 4.1 10^6/UL (ref 4–5.4)
WBC # BLD AUTO: 11.2 10^3/UL (ref 3.5–10.8)

## 2018-03-11 RX ADMIN — LACTULOSE SCH ML: 10 SOLUTION ORAL at 15:41

## 2018-03-11 RX ADMIN — ASPIRIN SCH MG: 81 TABLET, COATED ORAL at 08:19

## 2018-03-11 RX ADMIN — LACTULOSE SCH ML: 10 SOLUTION ORAL at 20:20

## 2018-03-11 RX ADMIN — LORAZEPAM SCH MG: 1 TABLET ORAL at 00:20

## 2018-03-11 RX ADMIN — SODIUM CHLORIDE SCH MLS/HR: 900 IRRIGANT IRRIGATION at 10:33

## 2018-03-11 RX ADMIN — FOLIC ACID SCH MG: 1 TABLET ORAL at 08:18

## 2018-03-11 RX ADMIN — OMEPRAZOLE SCH MG: 20 CAPSULE, DELAYED RELEASE ORAL at 08:19

## 2018-03-11 RX ADMIN — SENNOSIDES SCH: 8.6 TABLET, FILM COATED ORAL at 20:29

## 2018-03-11 RX ADMIN — METOPROLOL SUCCINATE SCH MG: 50 TABLET, EXTENDED RELEASE ORAL at 20:20

## 2018-03-11 RX ADMIN — BUPROPION HYDROCHLORIDE SCH MG: 100 TABLET, FILM COATED, EXTENDED RELEASE ORAL at 08:19

## 2018-03-11 RX ADMIN — LORAZEPAM SCH MG: 2 INJECTION INTRAMUSCULAR; INTRAVENOUS at 08:18

## 2018-03-11 RX ADMIN — VALSARTAN SCH MG: 160 TABLET ORAL at 08:18

## 2018-03-11 RX ADMIN — LORAZEPAM SCH MG: 2 INJECTION INTRAMUSCULAR; INTRAVENOUS at 03:19

## 2018-03-11 RX ADMIN — SODIUM CHLORIDE SCH MLS/HR: 900 IRRIGANT IRRIGATION at 03:43

## 2018-03-11 RX ADMIN — THERA TABS SCH TAB: TAB at 08:18

## 2018-03-11 RX ADMIN — THIAMINE HYDROCHLORIDE SCH MLS/HR: 100 INJECTION, SOLUTION INTRAMUSCULAR; INTRAVENOUS at 03:41

## 2018-03-11 RX ADMIN — WATER SCH NOTE: 100 INJECTION, SOLUTION INTRAVENOUS at 20:30

## 2018-03-11 RX ADMIN — SENNOSIDES SCH TAB: 8.6 TABLET, FILM COATED ORAL at 08:19

## 2018-03-11 RX ADMIN — DOCUSATE SODIUM SCH MG: 100 CAPSULE, LIQUID FILLED ORAL at 08:18

## 2018-03-11 RX ADMIN — THIAMINE HYDROCHLORIDE SCH MLS/HR: 100 INJECTION, SOLUTION INTRAMUSCULAR; INTRAVENOUS at 11:34

## 2018-03-11 RX ADMIN — DOCUSATE SODIUM SCH MG: 100 CAPSULE, LIQUID FILLED ORAL at 20:20

## 2018-03-11 RX ADMIN — OXYCODONE HYDROCHLORIDE AND ACETAMINOPHEN SCH MG: 500 TABLET ORAL at 08:18

## 2018-03-11 RX ADMIN — NICOTINE SCH PATCH: 21 PATCH TRANSDERMAL at 08:18

## 2018-03-11 RX ADMIN — ENOXAPARIN SODIUM SCH MG: 40 INJECTION SUBCUTANEOUS at 08:18

## 2018-03-11 RX ADMIN — LORAZEPAM SCH MG: 2 INJECTION INTRAMUSCULAR; INTRAVENOUS at 05:18

## 2018-03-11 RX ADMIN — THIAMINE HYDROCHLORIDE SCH MLS/HR: 100 INJECTION, SOLUTION INTRAMUSCULAR; INTRAVENOUS at 20:05

## 2018-03-11 RX ADMIN — LORAZEPAM SCH MG: 2 INJECTION INTRAMUSCULAR; INTRAVENOUS at 00:20

## 2018-03-11 RX ADMIN — SODIUM CHLORIDE SCH MLS/HR: 900 IRRIGANT IRRIGATION at 21:00

## 2018-03-11 NOTE — RAD
HISTORY: Cerebellar stroke



COMPARISONS: Head CT dated March 10, 2013



TECHNIQUE: The following sequences were obtained of the head: Sagittal T1-weighted images,

axial T2-weighted images, axial FLAIR images, axial T1-weighted images. Additionally,

axial diffusion-weighted images were obtained with calculated apparent diffusion

coefficients.    



FINDINGS: 



The study is limited by patient motion artifact.



HEMORRHAGE/INFARCT: There is a punctate focus of restricted diffusion within the right

posterior cingulate gyrus. Elsewhere, there is no hemorrhage or acute infarct.

MASSES/SHIFT: There is no mass or shift.

EXTRA-AXIAL SPACES/MENINGES: There is a trace subdural fluid collection along the

posterior falx suggestive of a tiny subdural hematoma.

SULCI AND VENTRICLES: There is diffuse and proportional enlargement of the sulci and

ventricles.



CEREBRUM: There is elevated T2/FLAIR signal in the cortex of the posterior single gyrus

BRAINSTEM: There are no focal parenchymal abnormalities.

CEREBELLUM: There are no focal parenchymal abnormalities. The cerebellar tonsils are

normal in size and position.



SELLA: The sella is normal.

PINEAL: The pineal region is clear.

CP ANGLE/TEMPORAL BONES: The labyrinthine structures are grossly normal.



VESSELS: Normal flow-voids are noted within the visualized vertebral vasculature.

DIFFUSION ABNORMALITIES: As noted above, there is restricted diffusion within the

cingulate gyrus posteriorly on the right. 



PARANASAL SINUSES/MASTOIDS: There are-fluid levels within the maxillary sinuses

bilaterally. There is mucosal thickening of the frontal sinus, ethmoid air cells, sphenoid

sinus, and maxillary sinuses.

ORBITS: The orbits are unremarkable.

BONES AND SOFT TISSUE: No bone or soft tissue abnormalities are noted.



OTHER: None



IMPRESSION: 

1.  THERE IS A PUNCTATE FOCUS OF ACUTE DIFFUSION WITHIN THE POSTERIOR SINGLE GYRUS ON THE

RIGHT CONSISTENT WITH SUBACUTE NONHEMORRHAGIC INFARCT.

2.  THERE IS A TINY POSTERIOR FALCINE SUBDURAL HEMATOMA TO THE RIGHT OF MIDLINE.

3.  DIFFUSE INVOLUTIONAL CHANGE.

4.  MODERATE SINUS MUCOSAL INFLAMMATORY DISEASE, WITH AIR-FLUID LEVELS IN THE MAXILLARY

SINUSES BILATERALLY. IN THE CORRECT CLINICAL SETTING, THIS MAY REPRESENT ACUTE SINUSITIS



PRELIMINARY FINDINGS WERE DISCUSSED WITH DR. DECKER AT APPROXIMATELY 9:02 PM ON MARCH 11, 2018.

## 2018-03-11 NOTE — PN
Subjective


Date of Service: 03/11/18


Interval History: 





has required 2mg IV ativan per the [facility] protocol.  says he feels okay but is 

only able to tell me his name, not where he is, why he is here, or the year.  

he denies pain, nausea.  he did not eat any breakfast despite being offered and 

encouraged.  a 1:1 is present in his room.   


Family History: Unchanged from Admission


Social History: Unchanged from Admission


Past Medical History: Unchanged from Admission





Objective


Active Medications: 








Al Hydrox/Mg Hydrox/Simethicone (Maalox Plus*)  30 ml PO Q6H PRN


   PRN Reason: INDIGESTION


Ascorbic Acid (Vitamin C  Tab*)  500 mg PO QAM [facility]


   Last Admin: 03/11/18 08:18 Dose:  500 mg


Aspirin (Aspirin Ec Low Dose*)  81 mg PO DAILY [facility]


   Last Admin: 03/11/18 08:19 Dose:  81 mg


Atorvastatin Calcium (Lipitor*)  40 mg PO BEDTIME [facility]


Bupropion HCl (Wellbutrin Sr Tab*)  100 mg PO QAM [facility]


   Last Admin: 03/11/18 08:19 Dose:  100 mg


Device (Nicotine Mouth Piece*)  1 each INH .USE WITH NICOTROL PRN


   PRN Reason: CRAVING


Docusate Sodium (Colace Cap*)  100 mg PO BID [facility]


   Last Admin: 03/11/18 08:18 Dose:  100 mg


Enoxaparin Sodium (Lovenox(*))  40 mg SUBCUT Q24H [facility]


   Last Admin: 03/11/18 08:18 Dose:  40 mg


Folic Acid (Folvite Tab*)  1 mg PO DAILY [facility]


   Last Admin: 03/11/18 08:18 Dose:  1 mg


Sodium Chloride (Ns 0.9% 1000 Ml*)  1,000 mls @ 150 mls/hr IV PER RATE [facility]


   Last Admin: 03/11/18 10:33 Dose:  150 mls/hr


Thiamine HCl 500 mg/ Sodium (Chloride)  255 mls @ 255 mls/hr IV Q8H [facility]


   Last Admin: 03/11/18 11:34 Dose:  255 mls/hr


Lactulose (Lactulose*)  15 ml PO TID [facility]


   Last Admin: 03/11/18 15:41 Dose:  15 ml


Lorazepam (Ativan Inj*)  0 - 6 mg IV PUSH .PER [facility] PROTOCOL [facility]


   PRN Reason: Protocol


   Last Admin: 03/11/18 08:18 Dose:  2 mg


Metoprolol Succinate (Toprol Xl Tab*)  50 mg PO BEDTIME [facility]


   Last Admin: 03/10/18 20:13 Dose:  50 mg


Morphine Sulfate (Morphine Inj (Syringe)*)  2 mg IV Q4H PRN


   PRN Reason: PAIN


   Last Admin: 03/09/18 13:23 Dose:  2 mg


Multivitamins/Minerals (Theragran/Minerals Tab*)  1 tab PO DAILY [facility]


   Last Admin: 03/11/18 08:18 Dose:  1 tab


Nicotine (Nicotine Inhaler*)  10 mg INH Q2H PRN


   PRN Reason: CRAVING


Nicotine (Nicotine Patch 21 Mg/24 Hr*)  1 patch TRANSDERM DAILY@0800 [facility]


   Last Admin: 03/11/18 08:18 Dose:  1 patch


Omeprazole (Prilosec Cap*)  20 mg PO DAILY [facility]


   Last Admin: 03/11/18 08:19 Dose:  20 mg


Ondansetron HCl (Zofran Inj*)  4 mg IV Q4H PRN


   PRN Reason: NAUSEA/VOMITING


Pharmacy Profile Note (Nicotine Patch Removal Note*)  1 note FOLLOW UP 2100 [facility]


   Last Admin: 03/10/18 20:19 Dose:  1 note


Senna (Senokot Tab*)  1 tab PO BID [facility]


   Last Admin: 03/11/18 08:19 Dose:  1 tab


Valsartan (Diovan Tab*)  160 mg PO QAM [facility]


   Last Admin: 03/11/18 08:18 Dose:  160 mg








 Vital Signs - 8 hr











  03/11/18 03/11/18





  09:33 11:36


 


Temperature 99.5 F 


 


Pulse Rate 79 


 


Respiratory 22 22





Rate  


 


Blood Pressure 157/35 





(mmHg)  


 


O2 Sat by Pulse 100 





Oximetry  











Oxygen Devices in Use Now: Nasal Cannula


Appearance: ill-appearing, tachypneic, tremulous


Eyes: - - pupils 3mm b/l and reactive to light.  unable to test for nystagmus, 

he does not follow instruction. 


Ears/Nose/Mouth/Throat: - - laceration on forehead sutured, inside lower lip 

sutured


Neck: NL Appearance and Movements; NL JVP, Trachea Midline


Respiratory: Symmetrical Chest Expansion and Respiratory Effort, Clear to 

Auscultation


Cardiovascular: NL Sounds; No Murmurs; No JVD, RRR


Abdominal: NL Sounds; No Tenderness; No Distention, No Hepatosplenomegaly


Extremities: No Edema


Skin: - - echymoses and abrasions on both knees


Neurological: - - he is able to shake my hand when I ask him to, but has 3/5 

strength in wrist extension. 4/5 in shoulder flexion.  lower extremity strength 

is 3/5 b/l.  he can follow simple commands like raising his leg from the bed 

but not complex commands.  he believes there are 8 people in the room (there 

were 4).  he can repeat "no ifs ands or buts" after me.  his face is symmetric.

  he is too unsteady to test gait. 


Result Diagrams: 


 03/11/18 05:05





 03/11/18 05:05


Additional Lab and Data: 


 








Assess/Plan/Problems-Billing


Assessment: 





Mr. Fisher is a 63 yo male with PMH significant for HTN and alcohol abuse who 

presented to the emergency room with complaints of a fall down stair and 

difficulty with ambulation.











- Patient Problems


(1) Gait abnormality


Current Visit: Yes   Status: Acute   Code(s): R26.9 - UNSPECIFIED ABNORMALITIES 

OF GAIT AND MOBILITY   SNOMED Code(s): 58656526


   Comment: At admission, radiology deemed him unable to have an MRI due to 

metallic FB in left orbit, but repeat CT head yesterday did not show this


Repeat CT head did, however, show concern for cerebellar CVA, which may have 

contributed to his ataxia


He also has upper extremity weakness, so would like to rule out a c-spine 

injury that was unable to be captured on c-spine CT at admission


I witnessed, along with his RN Tamir, consent for MRI verbally obtained from 

his daughter Mervat.  We will proceed with MRI brain and c-spine now.


Suspect Wernicke encephalopathy, so treating with thiamine


Neuro checks Q4H


   





(2) Wernicke encephalopathy


Current Visit: Yes   Status: Acute   Code(s): E51.2 - WERNICKE'S ENCEPHALOPATHY

   SNOMED Code(s): 58481669


   Comment: continue IV thiamine   





(3) Alcohol withdrawal


Current Visit: Yes   Status: Acute   Code(s): F10.239 - ALCOHOL DEPENDENCE WITH 

WITHDRAWAL, UNSPECIFIED   SNOMED Code(s): 859917000


   Comment: Ativan taper complete, continue PRN ativan, thiamine, folate and 

multivitamin


Social work consult when he is more alert and able to discuss rehab plans


   





(4) Facial fracture


Current Visit: Yes   Status: Acute   Code(s): S02.92XA - UNSP FRACTURE OF 

FACIAL BONES, INIT FOR CLOS FX   SNOMED Code(s): 85570321


   Comment: Nondisplaced fracture of the nasal bridge and nondisplaced fracture 

of the posterior lateral wall of the left maxilary sinus


Follow up with ENT outpatient next week (Dr. Van's office will call 4S 

on Monday to see if Pt is still here)   





(5) Impaired decision making


Current Visit: Yes   Status: Acute   Code(s): Z78.9 - OTHER SPECIFIED HEALTH 

STATUS   SNOMED Code(s): 187222132


   Comment: Patient's brother Anibal has been present during this admission and 

is the only local family member, but Mr. Fisher does have a daughter, who is 

the legal next of kin.  His daughter is Lyssa, who I spoke with today, and 

she does not want to defer her decision- making position to Anibal, so she should 

be notified of all updates and changes.  Verbal consent for the MRI was 

obtained from Lysas.  She lives in [place] and is planning to come to 

[place] this week.     


Status and Disposition: 





Inpatient.

## 2018-03-11 NOTE — RAD
HISTORY:  MRI screening



COMPARISONS: CT dated April 30, 2010



VIEWS: Frontal views of the abdomen.



FINDINGS: 

BOWEL: There is a nonspecific bowel gas pattern, with nondilated small bowel gas noted.

There is a large amount of stool within the colon.

CALCULI: There are no abnormal calculi.

BONES AND SOFT TISSUES: There are no osseous abnormalities.

OTHER FINDINGS: The lung bases are clear. There is no subphrenic gas. There are 2

radiopaque foci overlying the left upper quadrant. It is unclear if these are within the

soft tissues or within the lumen of the bowel.



IMPRESSION: 

2 RADIOPAQUE FOCI OVERLYING THE LEFT UPPER QUADRANT. IT IS UNCLEAR IF THESE ARE WITHIN THE

SOFT TISSUES OR WITHIN THE LUMEN OF THE BOWEL. CONSIDER FOLLOW-UP IMAGING PRIOR TO ANY MRI

IMAGING TO EVALUATE PROGRESSION OR RESOLUTION OF THESE RADIOPAQUE FOREIGN BODIES.
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
## 2018-03-11 NOTE — RAD
HISTORY:  MRI clearance.

COMPARISON:  None.

FINDINGS: Frontal and lateral views of the orbits.  There is no radiopaque foreign body

attributable to the orbits.  The orbital rims are intact.  The sinuses are clear.  The

zygomatic arches are normal.



IMPRESSION:

No radiopaque foreign body attributable to the orbits.

## 2018-03-11 NOTE — RAD
HISTORY: Rule out cord compression



COMPARISONS: None



TECHNIQUE: The following sequences were obtained of the cervical spine: Sagittal T1- and

T2-weighted images, sagittal STIR images, and axial T2-weighted images.    



FINDINGS:

Evaluation is limited by extensive patient motion artifact.



BRAIN AND SPINAL CORD: Within the limitations of the study, there is no appreciable

abnormal CORD signal.

ALIGNMENT: There is straightening with reversal of the cervical lordosis. There is grade 1

retrolisthesis of C2 on C3 and C3 and C4.

VERTEBRAL BODIES: There is multilevel anterolateral marginal osteophyte formation most

mass at C3-C4 inferiorly through C6-C7. There are Modic type I reactive endplate changes

at C4-C5 and C6-C7.

JOINTS: There is diffuse uncovertebral and facet osteoarthritis.

MUSCULATURE: Unremarkable

INTERVERTEBRAL DISCS: There is diffuse loss of intervertebral disc height and T2 signal

throughout the spine.



AXIAL IMAGES:

C2-C3: There is a small central disc protrusion versus posterior osteophyte. There is

moderate bilateral neural foraminal narrowing. There is mild narrowing of the central

canal.

C3-C4: There is a broad-based disc osteophyte complex with severe bilateral neural

foraminal narrowing and severe narrowing of the central canal.

C4-C5: There is a broad-based disc osteophyte complex with severe bilateral neural

foraminal narrowing and severe narrowing of the central canal.

C5-C6: There is a broad-based disc osteophyte complex with severe bilateral neural

foraminal narrowing and severe narrowing of the central canal.

C6-C7: There is broad-based disc osteophyte complex with bilateral uncovertebral and facet

hypertrophy. There is severe left and moderate right neural foraminal narrowing. There is

moderate narrowing of the central canal.

C7-T1: There is no disc herniation, spinal stenosis, or neuroforaminal narrowing.



SOFT TISSUES: There is edema of the prevertebral soft tissues. Within the limitations of

study, there is no appreciable disruption of the anterior longitudinal ligament, posterior

longitudinal ligament, ligamentum flavum



OTHER: None.



IMPRESSION:

1.  EVALUATION LIMITED BY EXTENSIVE PATIENT MOTION ARTIFACT.

2.  THERE IS NO APPRECIABLE ABNORMAL CORD SIGNAL WITHIN THE LIMITATIONS OF THE STUDY.

3.  THERE IS SIGNIFICANT DEGENERATIVE DISC DISEASE AND OSTEOARTHRITIS RESULTING IN SEVERE

NARROWING OF THE CENTRAL CANAL AT C3-C4, C4-C5, AND C5-C6 WITH MODERATE NARROWING AT C6-C7

AND MILD NARROWING AT C2-C3.

4.  THERE IS EDEMA OF THE PREVERTEBRAL SOFT TISSUES WHICH MAY REFLECT INJURY GIVEN THE

HISTORY OF TRAUMA. WITHIN THE LIMITATIONS OF THE STUDY, THERE IS NO APPRECIABLE

LIGAMENTOUS DISRUPTION. IF THERE IS CLINICAL CONCERN FOR FRACTURE, CONSIDER CORRELATION

WITH CT OF THE CERVICAL SPINE.

5.  THERE IS MULTILEVEL NEURAL FORAMINAL NARROWING AS DESCRIBED ABOVE..



PRELIMINARY FINDINGS WERE DISCUSSED WITH DR. DECKER AT APPROXIMATELY 9:02 PM ON MARCH 11, 2018.

## 2018-03-11 NOTE — ECHO
Patient:      HUMA GARCÍA

[facility] Rec#:     [id number]            :          1955          

Date:         2018            Age:          62y                 

Account#:     [id number]          Height:       180.3 cm / 71.0 in

Accession#:   [id number]           Weight:       74.4 kg / 164.0 lbs

Sex:          M                     BSA:          1.9

Room#:        437                   

Admit Date#:  2018          

Type:         Inpatient

 

Referring:    Karey Garrido MD

Reading:      Misha Harmon MD

Sonographer:  Radha Mendez RN RDCS

CC:           Darwin Miranda MD

______________________________________________________________________

 

Transthoracic Echocardiogram

 

Indication:

Cerebellar CVA

BP:           142/96

HR:           88

Rhythm:       NSR with PVCs

 

Findings     

History:

HTN, HLD, ETOH use, marijuana use, former smoker 

 

Technical Comments:

The study is technically limited due to the patient's smoking history.

Completed at 0950. 

 

Left Ventricle:

The left ventricular chamber size is normal. Mild concentric left

ventricular hypertrophy is observed. Global left ventricular wall motion

and contractility are within normal limits. There is normal left

ventricular systolic function. The estimated ejection fraction is

55-60%.  There is no consistent Doppler evidence of clinically

significant     diastolic dysfunction. 

 

Left Atrium:

The left atrial chamber size is normal. 

 

Right Ventricle:

The right ventricular chamber size and systolic function are within

normal limits. The right ventricle wall thickness is mildly increased. 

 

Right Atrium:

The right atrium is slightly dilated.  A patent foramen ovale is not

demonstrated by color Doppler.  

 

Aortic Valve:

The aortic valve structure is not well visualized. The aortic valve

leaflets are mildly thickened. There is no evidence of aortic

regurgitation. There is no evidence of aortic stenosis. 

 

Mitral Valve:

The mitral valve leaflets are mildly thickened. There is a trace of

mitral regurgitation. There is no evidence of mitral stenosis. 

 

Tricuspid Valve:

The tricuspid valve leaflets are normal.  There is trace tricuspid

regurgitation.  Unable to estimate the right ventricular systolic

pressure.   There is no tricuspid stenosis. 

 

Pulmonic Valve:

The pulmonic valve structure is not well visualized. There is no

evidence of pulmonic regurgitation. There is no pulmonic stenosis.  

 

Pericardium:

There is no significant pericardial effusion. A pericardial fat pad is

visualized. 

 

Aorta:

There is no dilatation of the ascending aorta. The aortic arch is not

well visualized.  There is no dilation of the aortic root. 

 

Pulmonary Artery:

The main pulmonary artery is not well visualized. 

 

Venous:

The inferior vena cava appears normal in size. There is a greater than

50% respiratory change in the inferior vena cava dimension. 

 

Summary:

There was not any prior study for comparison. 

 

Conclusions

Global left ventricular wall motion and contractility are within normal

limits.

There is normal left ventricular systolic function.

The estimated ejection fraction is 55-60%. 

The right ventricular chamber size and systolic function are within

normal limits.

A patent foramen ovale is not demonstrated by color Doppler. 

There is a trace of mitral regurgitation.

There is trace tricuspid regurgitation. 

Unable to estimate the right ventricular systolic pressure.  

There is no significant pericardial effusion.

 

Measurements     

Name                    Value         Normal Range            

RVIDd (AP) 2D           2.4 cm        (0.9 - 2.6)             

RVDdMajor (2D)          3.2 cm        (2.2 - 4.4)             

RVAW (2D)               0.8 cm        (0.2 - 0.5)             

RAd ISD 4CH             5 cm          (3.4 - 4.9)             

RA (A4C)W               4.1 cm        (2.9 - 4.6)             

IVSd (2D)               1.2 cm        (0.6 - 1)               

LVPWd (2D)              1.2 cm        (0.6 - 1)               

LVIDd (2D)              4.1 cm        (3.6 - 5.4)             

Aortic Annulus          2.2 cm        (1.4 - 2.6)             

Ao root diameter (2D)   2.8 cm        (2.1 - 3.5)             

Ascending Ao            2.9 cm        (2.1 - 3.4)             

LA dimension (AP) 2D    3.4 cm        (2.3 - 3.8)             

LAd ISD 4CH             4.5 cm        (2.9 - 5.3)             

LA ISD 4CH W            4.5 cm        (2.5 - 4.5)             

 

Name                    Value         Normal Range            

LA ESV SP 4CH (A/L)     62 ml         -                        

LA ESV SP 2CH (A/L)     50 ml         -                        

LA ESV BP (A/L)         56 ml         -                        

LA ESV BP (A/L) index   28.8 ml/m2    -                        

LA ESV SP 4CH (MOD)     59 ml         -                        

LA ESV SP 2CH (MOD)     47 ml         -                        

 

Name                    Value         Normal Range            

MV E-wave Vmax          0.76 m/sec    -                        

MV deceleration time    285 msec      -                        

MV A-wave Vmax          0.83 m/sec    -                        

MV E:A ratio            0.92 ratio    -                        

LV septal e' Vmax       0.08 m/sec    -                        

LV lateral e' Vmax      0.08 m/sec    -                        

LV E:e' septal ratio    9.5 ratio     -                        

LV E:e' lateral ratio   9.5 ratio     -                        

 

Name                    Value         Normal Range            

AV Vmax                 1.2 m/sec     -                        

AV VTI                  25.5 cm       -                        

AV peak gradient        5.8 mmHg      -                        

AV mean gradient        3.8 mmHg      -                        

LVOT Vmax               0.7 m/sec     -                        

LVOT VTI                16.6 cm       -                        

LVOT peak gradient      2 mmHg        -                        

LVOT mean gradient      1.1 mmHg      -                        

 

Name                    Value         Normal Range            

IVC diameter            2 cm          -                        

 

Name                    Value         Normal Range            

PV Vmax                 0.89 m/sec    -                        

 

Electronically signed by: Misha Harmon MD on 2018 10:42:02

## 2018-03-12 LAB
BASOPHILS # BLD AUTO: 0.1 10^3/UL (ref 0–0.2)
EOSINOPHIL # BLD AUTO: 0 10^3/UL (ref 0–0.6)
HCT VFR BLD AUTO: 38 % (ref 42–52)
HGB BLD-MCNC: 13.1 G/DL (ref 14–18)
INR PPP/BLD: 1.17 (ref 0.77–1.02)
LYMPHOCYTES # BLD AUTO: 1 10^3/UL (ref 1–4.8)
MCH RBC QN AUTO: 34 PG (ref 27–31)
MCHC RBC AUTO-ENTMCNC: 35 G/DL (ref 31–36)
MCV RBC AUTO: 98 FL (ref 80–94)
MONOCYTES # BLD AUTO: 1.2 10^3/UL (ref 0–0.8)
NEUTROPHILS # BLD AUTO: 9.6 10^3/UL (ref 1.5–7.7)
NRBC # BLD AUTO: 0 10^3/UL
NRBC BLD QL AUTO: 0
PLATELET # BLD AUTO: 321 10^3/UL (ref 150–450)
RBC # BLD AUTO: 3.86 10^6/UL (ref 4–5.4)
WBC # BLD AUTO: 12 10^3/UL (ref 3.5–10.8)

## 2018-03-12 RX ADMIN — SENNOSIDES SCH: 8.6 TABLET, FILM COATED ORAL at 12:56

## 2018-03-12 RX ADMIN — THERA TABS SCH: TAB at 12:56

## 2018-03-12 RX ADMIN — LACTULOSE SCH: 10 SOLUTION ORAL at 12:56

## 2018-03-12 RX ADMIN — HEPARIN SODIUM SCH UNITS: 5000 INJECTION INTRAVENOUS; SUBCUTANEOUS at 22:24

## 2018-03-12 RX ADMIN — LORAZEPAM PRN MG: 2 INJECTION INTRAMUSCULAR; INTRAVENOUS at 22:24

## 2018-03-12 RX ADMIN — THIAMINE HYDROCHLORIDE SCH MLS/HR: 100 INJECTION, SOLUTION INTRAMUSCULAR; INTRAVENOUS at 20:21

## 2018-03-12 RX ADMIN — SODIUM CHLORIDE SCH MLS/HR: 900 IRRIGANT IRRIGATION at 13:48

## 2018-03-12 RX ADMIN — PROPOFOL SCH MLS/HR: 10 INJECTION, EMULSION INTRAVENOUS at 18:16

## 2018-03-12 RX ADMIN — CHLORHEXIDINE GLUCONATE 0.12% ORAL RINSE SCH ML: 1.2 LIQUID ORAL at 20:15

## 2018-03-12 RX ADMIN — METOPROLOL TARTRATE SCH: 5 INJECTION, SOLUTION INTRAVENOUS at 21:22

## 2018-03-12 RX ADMIN — LORAZEPAM SCH MG: 2 INJECTION INTRAMUSCULAR; INTRAVENOUS at 12:13

## 2018-03-12 RX ADMIN — NICOTINE SCH PATCH: 21 PATCH TRANSDERMAL at 12:13

## 2018-03-12 RX ADMIN — THIAMINE HYDROCHLORIDE SCH MLS/HR: 100 INJECTION, SOLUTION INTRAMUSCULAR; INTRAVENOUS at 03:37

## 2018-03-12 RX ADMIN — PROPOFOL SCH MLS/HR: 10 INJECTION, EMULSION INTRAVENOUS at 22:39

## 2018-03-12 RX ADMIN — FOLIC ACID SCH: 1 TABLET ORAL at 12:55

## 2018-03-12 RX ADMIN — THIAMINE HYDROCHLORIDE SCH MLS/HR: 100 INJECTION, SOLUTION INTRAMUSCULAR; INTRAVENOUS at 14:37

## 2018-03-12 RX ADMIN — OMEPRAZOLE SCH: 20 CAPSULE, DELAYED RELEASE ORAL at 12:56

## 2018-03-12 RX ADMIN — METOPROLOL TARTRATE SCH: 5 INJECTION, SOLUTION INTRAVENOUS at 17:51

## 2018-03-12 RX ADMIN — DOCUSATE SODIUM SCH: 100 CAPSULE, LIQUID FILLED ORAL at 12:55

## 2018-03-12 RX ADMIN — CHLORHEXIDINE GLUCONATE 0.12% ORAL RINSE SCH ML: 1.2 LIQUID ORAL at 23:45

## 2018-03-12 RX ADMIN — LORAZEPAM SCH MG: 2 INJECTION INTRAMUSCULAR; INTRAVENOUS at 06:07

## 2018-03-12 RX ADMIN — BUPROPION HYDROCHLORIDE SCH MG: 100 TABLET, FILM COATED, EXTENDED RELEASE ORAL at 12:13

## 2018-03-12 RX ADMIN — OXYCODONE HYDROCHLORIDE AND ACETAMINOPHEN SCH: 500 TABLET ORAL at 12:55

## 2018-03-12 RX ADMIN — SODIUM CHLORIDE SCH MLS/HR: 900 IRRIGANT IRRIGATION at 16:46

## 2018-03-12 RX ADMIN — SODIUM CHLORIDE SCH MLS/HR: 900 IRRIGANT IRRIGATION at 03:41

## 2018-03-12 RX ADMIN — LACTULOSE SCH: 10 SOLUTION ORAL at 15:09

## 2018-03-12 RX ADMIN — WATER SCH NOTE: 100 INJECTION, SOLUTION INTRAVENOUS at 21:56

## 2018-03-12 RX ADMIN — LORAZEPAM SCH MG: 2 INJECTION INTRAMUSCULAR; INTRAVENOUS at 09:47

## 2018-03-12 RX ADMIN — VALSARTAN SCH MG: 160 TABLET ORAL at 12:13

## 2018-03-12 NOTE — RAD
HISTORY: Fall, rule out C-spine injury



COMPARISONS: March 11, 2015



TECHNIQUE: The following sequences were obtained of the cervical spine: Sagittal T1- and

T2-weighted images, sagittal STIR images, coronal T2-weighted images, axial T2 and

gradient echo images.    



FINDINGS:



The study is read in conjunction with the March 11, 2010 examination.



The study is markedly limited by extensive patient motion artifact. The diagnostic quality

is less than the March 11, 2018 examination. The current examination does not reveal any

new findings compared to the March 11, 2010 examination.



IMPRESSION:

THE CURRENT EXAMINATION IS MARKEDLY LIMITED BY PATIENT MOTION ARTIFACT AND IS OF LOWER

DIAGNOSTIC QUALITY THAN ON THE MARCH 11, 2018 EXAMINATION. PLEASE REFER TO THE REPORT OF

MARCH 11, 2018 EXAMINATION. NO NEW FINDINGS ARE NOTED ON THE CURRENT EXAMINATION.

## 2018-03-12 NOTE — RAD
HISTORY: Cerebellar stroke



COMPARISONS: MRI dated March 11, 2018



TECHNIQUE: Multiple contiguous axial CT scans were obtained of the head and neck after the

administration of nonionic intravenous contrast timed to the systemic arterial phase of

contrast enhancement. Coronal and sagittal multiplanar reformations are submitted for

review. Multiple 3-D maximum intensity projection reconstructions are also submitted for

review.    



FINDINGS:

Evaluation somewhat limited by patient motion artifact.



CTA NECK:



AORTIC ARCH: There is calcific atherosclerotic disease of the aortic arch, without ostial

or proximal stenosis of the cephalic great vessels. There is a normal three-vessel

branching pattern.



RIGHT VERTEBRAL ARTERY: The right vertebral artery is patent along its course, without

stenosis.

LEFT VERTEBRAL ARTERY: The left vertebral artery is patent along its course, without

stenosis.

DOMINANCE: The vertebral arteries are codominant.





RIGHT COMMON CAROTID ARTERY: The right common carotid artery is patent. The right carotid

bifurcation occurs at C3-C4

RIGHT INTERNAL CAROTID ARTERY: There is atheromatous disease of the right carotid

bifurcation, without right internal carotid artery stenosis by NASCET criteria. 

RIGHT EXTERNAL CAROTID ARTERY: The right external carotid artery is unremarkable.



LEFT COMMON CAROTID ARTERY: The left common carotid artery is patent. The left carotid

bifurcation occurs at C4-C5

LEFT INTERNAL CAROTID ARTERY: There is atheromatous disease of the left carotid

bifurcation, without left internal carotid artery stenosis by NASCET criteria. 

LEFT EXTERNAL CAROTID ARTERY: The left external carotid artery is unremarkable.



VENOUS CIRCULATION: The venous system is unremarkable.



SALIVARY GLANDS: The parotid glands, submandibular glands, sublingual glands are normal.



NASAL CAVITY/NASOPHARYNX: The nasal cavity and nasopharynx are normal.



ORAL CAVITY/OROPHARYNX: The oral cavity is obscured by streak artifact from dental

amalgam. The visualized oral cavity and oropharynx are unremarkable.

LARYNGEAL APPARATUS/HYPOPHARYNX: The laryngeal apparatus and hypopharynx are normal.



UPPER AIRWAY/UPPER ESOPHAGUS: The visualized upper airway and esophagus are normal.

LUNG APICES: There are small bilateral pleural effusions.



THYROID GLAND: The thyroid gland is normal.



LYMPH NODES: There is no lymphadenopathy by size criteria.



BONES AND SOFT TISSUES: Degenerative changes are noted in the cervical spine most

pronounced at C3-C4 inferiorly through C6-C7, as described on the MRI dated March 11, 2016.





CTA HEAD:



INTRACRANIAL CIRCULATION: There is no aneurysm, vascular malformation, occlusion, or

stenosis of the visualized intracranial circulation. The anterior communicating artery

complex is clear. Bilateral posterior communicating arteries are identified.



VENOUS CIRCULATION: The venous system is unremarkable.



PERFUSION: There is no obvious parenchymal perfusion deficit.



HEMORRHAGE/INFARCT: There is no hemorrhage or acute infarct.

MASSES/SHIFT: There is no mass or shift.

EXTRA-AXIAL SPACES: There are no extra-axial fluid collections. The small subdural

hematoma noted on MRI is not clearly appreciated on the current examination.

SULCI AND VENTRICLES: There is diffuse and proportional enlargement of the sulci and

ventricles.



CEREBRUM: There are no focal parenchymal abnormalities. 

BRAINSTEM: There are no focal parenchymal abnormalities.

CEREBELLUM: There are no focal parenchymal abnormalities.



PARANASAL SINUSES: There is mucosal thickening of the maxillary sinuses cells. There is an

air-fluid level within the right maxillary sinus.

ORBITS: The orbits are unremarkable.

BONES AND SOFT TISSUE: No bone or soft tissue abnormalities are noted.



OTHER: There is no abnormal enhancement.





IMPRESSION: 

1.  ATHEROSCLEROSIS.

2.  NO INTERNAL CAROTID ARTERY STENOSIS BY NASCET CRITERIA.

3.  NO ANEURYSM, VASCULAR MALFORMATION, OCCLUSION, OR STENOSIS OF THE VISUALIZED

INTRACRANIAL CIRCULATION..

4.  THE SMALL FALCINE SUBDURAL NOTED ON PREVIOUS MRI IS NOT WELL APPRECIATED ON THE

CURRENT EXAMINATION.

5.  MODERATE SINUS MUCOSAL INFLAMMATORY DISEASE, WITH AN AIR-FLUID LEVEL IN THE RIGHT

MAXILLARY SINUS. IN THE CORRECT CLINICAL SETTING, THIS MAY REPRESENT ACUTE SINUSITIS.



CPT II Codes: 3100F

## 2018-03-12 NOTE — EEG
ELECTROENCEPHALOGRAPHY:

 

DATE OF STUDY:  03/11/18

 

LOCATION:  He is an inpatient in room 437.

 

REFERRING PROVIDER:  Dr. Garrido.

 

CHIEF COMPLAINT:  Alcohol withdrawal, head injury, persistent encephalopathy.

 

MEDICATIONS:  Include:

1.  Lorazepam.

2.  Atorvastatin.

3.  Thiamine.

4.  Valsartan.

5.  Omeprazole.

6.  Bupropion.

 

REPORT:  This 16-channel EEG is remarkable for background rhythms consisting of 
fairly abundant beta activity.  Intermittent slowing is seen particularly from 
the occipital derivations and more from the left occipital area than the right.
  The patient is intermittently awake and asleep throughout the recording.  
There is no clearly defined alpha rhythm.  There are no epileptiform discharges 
during this recording.  There are no clear features of stage 2 sleep.

 

CLINICAL IMPRESSION:  Abnormal EEG due to generalized slowing and 
disorganization of background rhythms consistent with diffuse cerebral 
dysfunction.  There may be slower rhythms from the left hemisphere than the 
right, but both hemispheres are affected.  Excessive beta activity may 
represent benzodiazepine drug effect.  There are no epileptiform features to 
this recording.

 

[id number]/CPS #: [id number]

[facility]

## 2018-03-12 NOTE — PN
Subjective


Date of Service: 03/12/18


Interval History: 





Pt is very lethargic today, but received IV Ativan for WA protocol. Barely 

able to speak, loud snoring noted.


Family History: Unchanged from Admission


Social History: Unchanged from Admission


Past Medical History: Unchanged from Admission





Objective


Active Medications: 








Al Hydrox/Mg Hydrox/Simethicone (Maalox Plus*)  30 ml PO Q6H PRN


   PRN Reason: INDIGESTION


Ascorbic Acid (Vitamin C  Tab*)  500 mg PO QAM [facility]


   Last Admin: 03/11/18 08:18 Dose:  500 mg


Atorvastatin Calcium (Lipitor*)  40 mg PO BEDTIME [facility]


   Last Admin: 03/11/18 20:20 Dose:  40 mg


Device (Nicotine Mouth Piece*)  1 each INH .USE WITH NICOTROL PRN


   PRN Reason: CRAVING


Docusate Sodium (Colace Cap*)  100 mg PO BID [facility]


   Last Admin: 03/11/18 20:20 Dose:  100 mg


Folic Acid (Folvite Tab*)  1 mg PO DAILY [facility]


   Last Admin: 03/11/18 08:18 Dose:  1 mg


Thiamine HCl 500 mg/ Sodium (Chloride)  255 mls @ 255 mls/hr IV Q8H [facility]


   Stop: 03/12/18 23:59


   Last Admin: 03/12/18 03:37 Dose:  255 mls/hr


Magnesium Sulfate/Dextrose (Magnesium Sulfate 1 Gm Iv*)  1 gm in 100 mls @ 200 

mls/hr IV ONCE ONE


   Stop: 03/12/18 13:17


Famotidine 20 mg/ Sodium (Chloride)  102 mls @ 408 mls/hr IVPB DAILY [facility]


Lactulose (Lactulose*)  15 ml PO TID [facility]


   Last Admin: 03/11/18 20:20 Dose:  15 ml


Lorazepam (Ativan Inj*)  2 mg IV PUSH Q6H PRN


   PRN Reason: AGITATION


Lorazepam (Ativan Inj*)  0.5 mg IV PUSH Q8H PRN


   PRN Reason: ANXIETY


Magnesium Oxide (Magox 400 Tab*)  800 mg PO DAILY [facility]


Metoprolol Succinate (Toprol Xl Tab*)  50 mg PO BEDTIME [facility]


   Last Admin: 03/11/18 20:20 Dose:  50 mg


Morphine Sulfate (Morphine Inj (Syringe)*)  2 mg IV Q4H PRN


   PRN Reason: PAIN


   Last Admin: 03/09/18 13:23 Dose:  2 mg


Multivitamins/Minerals (Theragran/Minerals Tab*)  1 tab PO DAILY [facility]


   Last Admin: 03/11/18 08:18 Dose:  1 tab


Nicotine (Nicotine Inhaler*)  10 mg INH Q2H PRN


   PRN Reason: CRAVING


Nicotine (Nicotine Patch 21 Mg/24 Hr*)  1 patch TRANSDERM DAILY@0800 [facility]


   Last Admin: 03/12/18 12:13 Dose:  1 patch


Ondansetron HCl (Zofran Inj*)  4 mg IV Q4H PRN


   PRN Reason: NAUSEA/VOMITING


Pharmacy Profile Note (Nicotine Patch Removal Note*)  1 note FOLLOW UP 2100 [facility]


   Last Admin: 03/11/18 20:30 Dose:  1 note


Senna (Senokot Tab*)  1 tab PO BID [facility]


   Last Admin: 03/11/18 20:29 Dose:  Not Given


Thiamine HCl (Vitamin B-1 Tab*)  100 mg PO TID [facility]


Valsartan (Diovan Tab*)  160 mg PO QAM [facility]


   Last Admin: 03/12/18 12:13 Dose:  160 mg








 Vital Signs - 8 hr











  03/12/18 03/12/18 03/12/18





  06:04 06:07 07:16


 


Temperature 97.2 F  


 


Pulse Rate 84  86


 


Respiratory 24 26 40





Rate   


 


Blood Pressure 151/78  161/76





(mmHg)   


 


O2 Sat by Pulse   89





Oximetry   














  03/12/18 03/12/18 03/12/18





  07:17 08:00 08:25


 


Temperature   


 


Pulse Rate   


 


Respiratory  36 40





Rate   


 


Blood Pressure   





(mmHg)   


 


O2 Sat by Pulse 90  





Oximetry   














  03/12/18 03/12/18 03/12/18





  09:33 09:47 11:53


 


Temperature 98.8 F  99.7 F


 


Pulse Rate 82  87


 


Respiratory 38 36 32





Rate   


 


Blood Pressure 163/78  156/81





(mmHg)   


 


O2 Sat by Pulse 99  96





Oximetry   














  03/12/18 03/12/18





  11:55 12:13


 


Temperature  


 


Pulse Rate  


 


Respiratory 32 34





Rate  


 


Blood Pressure  





(mmHg)  


 


O2 Sat by Pulse  





Oximetry  











Oxygen Devices in Use Now: Nasal Cannula, High Flow Nasal Cannula


Appearance: 61 yo M in nAD, nasal congestion noted with breathing, very 

lethargic, able to answer only to "yes, no" questions


Eyes: No Scleral Icterus, PERRLA


Ears/Nose/Mouth/Throat: - - dry mucosa, dry blood on lower lip


Neck: NL Appearance and Movements; NL JVP, Trachea Midline


Respiratory: Clear to Auscultation - sonorous breathing noted


Cardiovascular: NL Sounds; No Murmurs; No JVD, RRR


Abdominal: NL Sounds; No Tenderness; No Distention, No Hepatosplenomegaly


Lymphatic: No Cervical Adenopathy


Extremities: No Edema, No Clubbing, Cyanosis


Skin: - - forehead laceration sutured, skin tears and abrasions on b/l knees 

and lower lip


Neurological: - - b/l wrist drop, motor at 3+/5 L>R, unable to raise legs of 

bed b/l


Result Diagrams: 


 03/12/18 04:55





 03/12/18 04:55


Additional Lab and Data: 


 








Assess/Plan/Problems-Billing


Assessment: 





Mr. Fisher is a 61 yo male with PMH significant for HTN and alcohol abuse who 

presented to the emergency room with complaints of a fall down stair and 

difficulty with ambulation.











- Patient Problems


(1) Lethargy


Comment: combination of benzodiazepine use and possible ETOH/hepatic 

encaphalopthy


fot now needs to be NPO due to marked lethargy


cont VF and Papcid IV for GI prophylaxis


Ammonia level from today pending


Suspect Wernicke encephalopathy, treating with thiamine   





(2) Gait abnormality


Comment: MRI brain shows small SDH and ischemic CVA that is not matching pt's 

symptoms.


concern for C spijh einjury on MRU of c spine although the motion artifact 

makes it difficult to determine.


For now C spine collar in place, Dr. Kingsley and Yury consulted


Neuro checks Q4H


   





(3) Alcohol withdrawal


SNOMED Code(s): 575507120


   Comment: Ativan taper completed, continue PRN ativan, thiamine, folate and 

multivitamin


WAM d/c'd





   





(4) Facial fracture


Comment: Nondisplaced fracture of the nasal bridge and nondisplaced fracture of 

the posterior lateral wall of the left maxilary sinus


Follow up with ENT outpatient next week (Dr. Van's office will call [facility] 

on Monday to see if Pt is still here)   





(5) Impaired decision making


Comment: Pt's daughter Lyssa -should be notified of all updates and changes. 

  She lives in [place] and is planning to come to [place] this week.     





(6) HTN (hypertension)


Comment: 


- -150's


holding PO meds when NPO, start lopressor IV   





(7) Leukocytosis


Comment: 


- Improving


- Chest xray and UA negative


- Suspect stress response from fall


- Will recheck labs in the AM   





(8) DVT prophylaxis


Comment: 


- Lovenox d/c's due to small SDH


will start SCD's   


Status and Disposition: 





Inpatient.

## 2018-03-12 NOTE — RAD
INDICATION:  Possible aspiration evaluate for pneumonia.



COMPARISON:  Comparison is made with a prior study from March 08, 2018.



TECHNIQUE: A portable view of the chest was obtained.



FINDINGS: The heart is within normal limits in size.



There is mild prominence of the interstitial markings. The lungs are underinflated. There

are new small infiltrates at both lung bases.



IMPRESSION:  NEW SMALL BIBASILAR INFILTRATES.

## 2018-03-12 NOTE — PN
NEUROLOGY CONSULT FOLLOWUP:

 

DATE OF FOLLOWUP:  03/12/18

 

HOSPITALIST:  Airam Mcguire MD

 

LOCATION:  The patient is in room 437.

 

CHIEF COMPLAINT:  Alcohol withdrawal, head and neck injury.

 

INTERVAL HISTORY:  Since yesterday, Tanner had an MRI of his brain which I 
reviewed.  There is a tiny punctate right occipital cortical infarct.  There is 
also a very small subdural hematoma in the right side of the occipital falx.

 

His MRI of the cervical spine shows lot of artifact, but also pretty severe 
central canal stenosis from C3 down to at least C5.  There is also some 
prevertebral edema consistent with trauma.  An attempt at a second cervical MRI 
today showed worse motion artifact.

 

MEDICATIONS:  Reviewed.  He remains on:

1.  Lorazepam.

2.  WAM protocol.

3.  Bupropion 100 mg p.o. daily.

4.  Atorvastatin 40 mg p.o. daily.

5.  Morphine IV q.4 hours as needed for pain.

6.  Thiamine 100 mg p.o. t.i.d.

7.  Nicotine patch.

 

PHYSICAL EXAM:  He has a hard collar on now and he is snoring respirations.  
When I have stimulated, he wakes up but continues to have loud respirations.  
His most recent temperature 99.7 temporally, blood pressure 156/81, heart rate 
80s and regular, respiratory rate is running in low 30s.  Oxygen saturation is 
96% on supplemental oxygen.  Neurologic:  Eye movements are full.  He is 
somnolent.  He is able to answer few commands and denies neck pain.  He can 
weakly raise his arms proximally and exhibits grade 4- deltoid weakness.  He 
has lessen antigravity wrist extensor weakness bilaterally and grade 4-right 
triceps weakness.  He can weakly raise his legs up off the bed, but only a few 
inches and then they drop down again.

 

DIAGNOSTIC STUDIES/LAB DATA:  Other laboratory data includes ammonia level 
yesterday of 55, sedimentation rate of 69.  INR is up to 1.17 today.

 

IMPRESSION:  Alcohol withdrawal encephalopathy, possible cervical cord 
compression, possible bilateral C7 radiculopathies.

 

PLAN:  I have discussed the case with Dr. Mcguire and he agreed that 
benzodiazepine should be held to see if he becomes more alert.  We will recheck 
his ammonia level.  Dr. Kingsley has been asked to see the patient in 
neurosurgical consultation.  If his ammonia level is going up, he may need a 
nasogastric tube for lactulose.  If that becomes the case, ENT may need to see 
him because of his facial fracture.  I will continue to follow him.

 

 [id number]/[id number] #: [id number]

HIRAL

## 2018-03-12 NOTE — RAD
INDICATION: Status post intubation



COMPARISON: Most recent chest x-ray is from the same date acquired at 1454 hours

 

TECHNIQUE: Single AP portable view of the chest was obtained at 1552 hours.



FINDINGS: 



Image quality is compromised due to the relative inferiority of a portable chest x-ray.



There is been interval placement of a gastric tube with the tip terminating below the

level the diaphragm and out of the field of view of the radiograph. The tip of the

endotracheal tube is positioned approximately 7 cm above the rebecca and below the level

the clavicular heads.



Patchy densities overlying the lungs are similar in appearance to the earlier chest x-ray.

There is no pneumothorax.



IMPRESSION:  Appropriate positioning of lines and tubes status post intubation.

## 2018-03-12 NOTE — PN
Progress Note





- Progress Note


Date of Service: 03/12/18


SOAP: 


Subjective:


[]Asked to see patient with fall down stairs several days ago striking his 

forehead and likely experiencing hyperextension injury to spine. Seen by Dr. Cotton yesterday who was concerned about hand weakness and ordered MRI of C 

spine and brain. C spine shows diffuse spondylosis with cord narrowing from C3 

to C6. No abnormal cord signal.Patient worsened today and was transferred to 

ICU where he was electively intubated and sedated to protect his airway.








Objective:


[]Sedated


Minimal response to pain


Toes upgoing bilaterally








Assessment:


[]Probable central cord syndrome








Plan:


[]Supportive care for now


No indication for acute surgical intervention


Will follow with you


Discussed with Dr. Montes

## 2018-03-13 LAB
BASOPHILS # BLD AUTO: 0 10^3/UL (ref 0–0.2)
EOSINOPHIL # BLD AUTO: 0.1 10^3/UL (ref 0–0.6)
HCT VFR BLD AUTO: 36 % (ref 42–52)
HGB BLD-MCNC: 12.3 G/DL (ref 14–18)
LYMPHOCYTES # BLD AUTO: 0.8 10^3/UL (ref 1–4.8)
MCH RBC QN AUTO: 35 PG (ref 27–31)
MCHC RBC AUTO-ENTMCNC: 35 G/DL (ref 31–36)
MCV RBC AUTO: 100 FL (ref 80–94)
MONOCYTES # BLD AUTO: 0.9 10^3/UL (ref 0–0.8)
NEUTROPHILS # BLD AUTO: 6.2 10^3/UL (ref 1.5–7.7)
NRBC # BLD AUTO: 0 10^3/UL
NRBC BLD QL AUTO: 0
PLATELET # BLD AUTO: 314 10^3/UL (ref 150–450)
RBC # BLD AUTO: 3.56 10^6/UL (ref 4–5.4)
WBC # BLD AUTO: 8 10^3/UL (ref 3.5–10.8)

## 2018-03-13 PROCEDURE — 0BH17EZ INSERTION OF ENDOTRACHEAL AIRWAY INTO TRACHEA, VIA NATURAL OR ARTIFICIAL OPENING: ICD-10-PCS | Performed by: HOSPITALIST

## 2018-03-13 PROCEDURE — 0T9B70Z DRAINAGE OF BLADDER WITH DRAINAGE DEVICE, VIA NATURAL OR ARTIFICIAL OPENING: ICD-10-PCS | Performed by: HOSPITALIST

## 2018-03-13 PROCEDURE — 4A00X4Z MEASUREMENT OF CENTRAL NERVOUS ELECTRICAL ACTIVITY, EXTERNAL APPROACH: ICD-10-PCS | Performed by: HOSPITALIST

## 2018-03-13 PROCEDURE — 5A1945Z RESPIRATORY VENTILATION, 24-96 CONSECUTIVE HOURS: ICD-10-PCS | Performed by: HOSPITALIST

## 2018-03-13 RX ADMIN — NICOTINE SCH PATCH: 21 PATCH TRANSDERMAL at 07:46

## 2018-03-13 RX ADMIN — CHLORHEXIDINE GLUCONATE 0.12% ORAL RINSE SCH: 1.2 LIQUID ORAL at 17:19

## 2018-03-13 RX ADMIN — Medication SCH MG: at 21:56

## 2018-03-13 RX ADMIN — DEXAMETHASONE SODIUM PHOSPHATE SCH MG: 4 INJECTION, SOLUTION INTRAMUSCULAR; INTRAVENOUS at 19:33

## 2018-03-13 RX ADMIN — HEPARIN SODIUM SCH UNITS: 5000 INJECTION INTRAVENOUS; SUBCUTANEOUS at 13:04

## 2018-03-13 RX ADMIN — Medication SCH MG: at 07:48

## 2018-03-13 RX ADMIN — FOLIC ACID SCH MG: 1 TABLET ORAL at 13:04

## 2018-03-13 RX ADMIN — PROPOFOL SCH MLS/HR: 10 INJECTION, EMULSION INTRAVENOUS at 17:25

## 2018-03-13 RX ADMIN — SODIUM CHLORIDE SCH MLS/HR: 900 IRRIGANT IRRIGATION at 13:38

## 2018-03-13 RX ADMIN — CHLORHEXIDINE GLUCONATE 0.12% ORAL RINSE SCH ML: 1.2 LIQUID ORAL at 11:49

## 2018-03-13 RX ADMIN — HEPARIN SODIUM SCH UNITS: 5000 INJECTION INTRAVENOUS; SUBCUTANEOUS at 05:42

## 2018-03-13 RX ADMIN — LORAZEPAM PRN MG: 2 INJECTION INTRAMUSCULAR; INTRAVENOUS at 05:42

## 2018-03-13 RX ADMIN — METOPROLOL TARTRATE SCH: 5 INJECTION, SOLUTION INTRAVENOUS at 02:55

## 2018-03-13 RX ADMIN — CHLORHEXIDINE GLUCONATE 0.12% ORAL RINSE SCH ML: 1.2 LIQUID ORAL at 05:42

## 2018-03-13 RX ADMIN — WATER SCH NOTE: 100 INJECTION, SOLUTION INTRAVENOUS at 21:57

## 2018-03-13 RX ADMIN — CHLORHEXIDINE GLUCONATE 0.12% ORAL RINSE SCH ML: 1.2 LIQUID ORAL at 03:37

## 2018-03-13 RX ADMIN — METOPROLOL TARTRATE SCH: 5 INJECTION, SOLUTION INTRAVENOUS at 07:48

## 2018-03-13 RX ADMIN — SODIUM CHLORIDE SCH MLS/HR: 900 IRRIGANT IRRIGATION at 21:56

## 2018-03-13 RX ADMIN — CHLORHEXIDINE GLUCONATE 0.12% ORAL RINSE SCH ML: 1.2 LIQUID ORAL at 19:33

## 2018-03-13 RX ADMIN — PROPOFOL SCH MLS/HR: 10 INJECTION, EMULSION INTRAVENOUS at 13:38

## 2018-03-13 RX ADMIN — LORAZEPAM PRN MG: 2 INJECTION INTRAMUSCULAR; INTRAVENOUS at 15:39

## 2018-03-13 RX ADMIN — METOPROLOL TARTRATE SCH: 5 INJECTION, SOLUTION INTRAVENOUS at 14:33

## 2018-03-13 RX ADMIN — METOPROLOL TARTRATE SCH: 5 INJECTION, SOLUTION INTRAVENOUS at 21:00

## 2018-03-13 RX ADMIN — PROPOFOL SCH MLS/HR: 10 INJECTION, EMULSION INTRAVENOUS at 03:35

## 2018-03-13 RX ADMIN — PROPOFOL SCH MLS/HR: 10 INJECTION, EMULSION INTRAVENOUS at 08:44

## 2018-03-13 RX ADMIN — PROPOFOL SCH MLS/HR: 10 INJECTION, EMULSION INTRAVENOUS at 21:56

## 2018-03-13 RX ADMIN — Medication SCH MG: at 13:04

## 2018-03-13 RX ADMIN — DEXAMETHASONE SODIUM PHOSPHATE SCH MG: 4 INJECTION, SOLUTION INTRAMUSCULAR; INTRAVENOUS at 13:04

## 2018-03-13 RX ADMIN — CHLORHEXIDINE GLUCONATE 0.12% ORAL RINSE SCH ML: 1.2 LIQUID ORAL at 23:56

## 2018-03-13 RX ADMIN — FAMOTIDINE SCH MG: 10 INJECTION, SOLUTION INTRAVENOUS at 07:47

## 2018-03-13 RX ADMIN — HEPARIN SODIUM SCH UNITS: 5000 INJECTION INTRAVENOUS; SUBCUTANEOUS at 21:56

## 2018-03-13 NOTE — PN
Progress Note





- Progress Note


Date of Service: 03/13/18


Note: 





CRITICAL CARE MEDICINE


PROCEDURE NOTE 


DATE:  3/13/18   TIME:  1430


SERVICE: Critical Care Medicine


LOCATION OF PROCEDURE: ICU


PROCEDURE: Endotracheal intubation


PROCEDURALIST: Dr. Montes


Consent obtain: No, procedure performed emergently


Time out held: Not indicated





INDICATION: Acute respiratory failure with airway compromise. 





PROCEDURE:  


Oxygenation maintained and vitals monitored. 


Patient in supine position. 


Pre-medication with fentanyl 200mcg, versed 6mg, and then post propofol 50mg.





Glidescope #4 inserted with Grade 2 view obtained. 


7.5 endotracheal tube inserted to 26cm lip.  


Good chest rise with breath sounds appreciated in bilaterally lung fields.


EtCO2 + color change.  


Portable chest x-ray -  ett a touch high but ok.


Patient otherwise tolerated. 





LEROY Montes DO 07-Sep-2017 03:05

## 2018-03-13 NOTE — PN
Progress Note





- Progress Note


Date of Service: 03/13/18


Note: 





CRITICAL CARE MEDICINE


DATE: 3/13/18      TIME: 1100 





SUBJECTIVE: Patient seen and examined. on nursing wake up pt moving all ext. 





PHYSICAL EXAM:


Vital Signs: Reviewed. 


Neurologic: hi, but remaining sedate for now.


HEENT: abrasions and mild echymosis in evolution. ett. ogt


Cardiovascular: distant, reg S1 S2


Respiratory: coarse without rhonchi. 25%


Abdomen: soft, nt


Extremities: no edema. has tone. abrasions. 


Access: piv 





LABS: Reviewed.


IMAGING: Reviewed.


MEDICATIONS: Reviewed.





ASSESSMENT: 62 M


Acute hypoxic resp failure


Acute alcohol withdrawal


Benzo use combating withdrawal


Cervical spine ligamentous injury 


?C7 involvement


Small sdh


Tob use


Etoh use





PLAN:


Neurologic: stabilized. at least acting better then central core syndrome at 

this time but close f/u. airway protection today. continued neuro assessment 

and once liberated can f/u exam but no acute interventions. 


Cardiovascular: perfusing. ivf off later today. no shock. 


Respiratory: maintain intubation. f/u cuff leak. re-eval tomorrow for potential 

liberation opportunities. add steroids for what its worth for edema. 


Gastrointestinal: ogt. tf. sup. needs lactulose


Renal/Metabolic: f/u lytes and maintain


Infectious Disease: no infective burden. 


Hematology: stable. hsq


Endocrine: f/u bg


Musculoskeletal: f/u; eventual pt, rehabs needs etc


Psych/Social: daughter and pts brother at bedside updated





Supportive and preventative care as ordered.





SUP: H2


VTE prophylaxis: heparin


Mary catheter given critical illness, monitoring needs for accurate assessment 

of ANTHONY and KDIGO criteria for critically ill patients and to avoid potential 

harms of urinary retention, skin breakdown/ulcers.


Disposition: ICU


Code Status: Full


Critical Care Time: 35min.


LEROY Montes DO

## 2018-03-13 NOTE — CONSULT
Consult


Consult: 





CRITICAL CARE MEDICINE


LATE  ENTRY


DATE: 3/12/18      TIME: 1400 


REFERRING PROVIDER: Maynor





REASON/CHIEF COMPLAINT: airway compromise





HISTORY OF PRESENT ILLNESS: 62 M with h/o etoh abuse presenting 3/8 post fall 

down flight of stairs with acute intoxication (etoh 187).  Admitted to medical 

service for instability and acute etoh withdrawal concerns.  Undergoing wam 

protocol and recieving benzos. multiple episodes of agitation.  More somulent 

on 3/12 am with sonorous respirations. had been evaluated by neuro the day 

prior with concerns for bl wrist drop and mri cervical repeated as prior with 

artifact. concern for C7 injury. Pt less arousable through day on 3/12 and not 

clearing secretions with inc O2 needs and brought down to ICU.  He had 

increased rr, so less concern for diaphagmatic failure, but he was more 

lethargic to obtunded with nml pH, with c collar in place but sonorous resp 

that somewhat improved with tongue protrusion but not dissipated. Weak cough. 

poor gag. suctioned with thick secretions. He was able to flex his knees and 

move his feet but would not move them to painful stimuli and I could not get 

him to move his arms voluntarily nor with painful stimuli.  Given all these 

concerns, made decision to secure airway and more sort out his dynamics.  





REVIEW OF SYSTEMS: As per HPI.


PAST MEDICAL HISTORY: As per HPI. htn, cataract


MEDICATIONS: Reviewed.


ALLERGIES: Reviewed. 


SOCIAL HISTORY: Reviewed. etoh, tob; semi-retired; lives alone


FAMILY HISTORY: Noncontributory at present.





PHYSICAL EXAM:


Vital Signs: Reviewed. 


Neurologic: as per hpi


HEENT: abrasions and mild echymosis. 


Cardiovascular: distant, S1 S2


Respiratory: coarse with rhonchi and poor cough. 


Abdomen: soft, nt


Extremities: no dep edema. has tone. 


Access: piv 





LABS: Reviewed.


IMAGING: Reviewed.


MEDICATIONS: Reviewed.





ASSESSMENT: 62 M


Acute hypoxic resp failure


Acute alcohol withdrawal


Benzo use combating withdrawal


Cervical spine ligamentous injury- question degree, but at the moment acting 

like a central cord injury


Small sdh


Tob use


Etoh use





PLAN:


Neurologic: concerns as above.  airway control. sedation. avoid further 

cervical insult. propofol gtt and hold off on benzos as able and see if we can 

obtain better intermittent exam. d/w neuro and nsgy. no acute interventions 

otherwise. 


Cardiovascular: perfusing. ivf. 


Respiratory: intubation. mv protection and bundle. not much laryngeal edema but 

floppy tissues; can trial course of steroids or at least prior to liberation. 


Gastrointestinal: ogt. tf. sup. lactulose


Renal/Metabolic: f/u lytes and maintain


Infectious Disease: no infective burden. 


Hematology: stable. hsq


Endocrine: f/u bg


Musculoskeletal: nondisplaced fx. local care. nsgy f/u cervical needs; remain 

in c collar


Psych/Social: friend at bedside briefly updated and will d/w pts daughter; 

suppl. nicotine td





Supportive and preventative care as ordered.





SUP: H2


VTE prophylaxis: heparin


Mary catheter given critical illness, monitoring needs for accurate assessment 

of ANTHONY and KDIGO criteria for critically ill patients and to avoid potential 

harms of urinary retention, skin breakdown/ulcers.


Disposition: ICU


Code Status: Full


Critical Care Time: 45min, excluding procedures. 





ADDENDUM: Post intubation, wake up assessment, pt spontaneously moving both 

arms and legs. Arms were moving more proximal but still with tone and movement. 

Not following commands and requiring sedation for agitation. 


LEROY Montes, DO

## 2018-03-13 NOTE — PN
Subjective


Date of Service: 03/13/18


Interval History: 





Overnight, the patient remained on propofol, 50 mcg, currently on 40mcg.  He 

became hypotensive two separate times last night requiring 1L NS each time (

total of 2L).  Otherwise, remained sedated all night, no agitation.  OG tube in 

place.  Low grade fevers yesterday evening.  Events since admission reviewed. 


Neurosurgery note reviewed.  No intervention at this time but suspect central 

cord. 





On exam yesterday Dr. Cotton noted:  4- Deltoid weakness bilaterally, 

bilateral wrist drop and 4- right triceps weakness, concerning for C7 lesion. 

He could raise his legs off the bed several inches but dropped them 

immediately. He was arousable yesterday.





-MRI C-spine repeated yesterday shows significant stenosis C3-6 with 

prevertebral soft tissue edema (films reviewed).  


-MRI Brain shows falcine subdural, although not well visualized on CTA 

yesterday which showed no significant stenosis or vascular abnormalities. 








Family History: Unchanged from Admission


Social History: Unchanged from Admission


Past Medical History: Unchanged from Admission





Objective


Active Medications: 








Chlorhexidine Gluconate (Peridex Mouth Wash 0.12%*)  15 ml TOPICAL Q4H [facility]


   Last Admin: 03/13/18 05:42 Dose:  15 ml


Device (Nicotine Mouth Piece*)  1 each INH .USE WITH NICOTROL PRN


   PRN Reason: CRAVING


Famotidine (Pepcid Iv*)  20 mg IV SLOW PU DAILY [facility]


Heparin Sodium (Porcine) (Heparin Vial(*))  5,000 units SUBCUT Q8HR [facility]


   Last Admin: 03/13/18 05:42 Dose:  5,000 units


Sodium Chloride (Ns 0.9% 1000 Ml*)  1,000 mls @ 125 mls/hr IV PER RATE [facility]


   Last Admin: 03/12/18 16:46 Dose:  125 mls/hr


Propofol (Diprivan*)  100 mls @ 0 mls/hr IV .(Initial Rate) [facility]; Per Protocol


   PRN Reason: Protocol


   Last Admin: 03/13/18 03:35 Dose:  22 mls/hr


Lorazepam (Ativan Inj*)  1 mg IV PUSH Q4H PRN


   PRN Reason: ANXIETY


   Last Admin: 03/13/18 05:42 Dose:  1 mg


Metoprolol Tartrate (Lopressor Iv*)  5 mg IV Q6H [facility]


   Last Admin: 03/13/18 02:55 Dose:  Not Given


Nicotine (Nicotine Inhaler*)  10 mg INH Q2H PRN


   PRN Reason: CRAVING


Nicotine (Nicotine Patch 21 Mg/24 Hr*)  1 patch TRANSDERM DAILY@0800 [facility]


   Last Admin: 03/12/18 12:13 Dose:  1 patch


Ondansetron HCl (Zofran Inj*)  4 mg IV Q4H PRN


   PRN Reason: NAUSEA/VOMITING


Pharmacy Profile Note (Nicotine Patch Removal Note*)  1 note FOLLOW UP 2100 [facility]


   Last Admin: 03/12/18 21:56 Dose:  1 note


Thiamine HCl (Vitamin B-1 Tab*)  100 mg PO TID [facility]








 Vital Signs











  03/12/18 03/12/18 03/12/18





  08:00 08:25 09:33


 


Temperature   98.8 F


 


Pulse Rate   82


 


Respiratory 36 40 38





Rate   


 


Blood Pressure   163/78





(mmHg)   


 


O2 Sat by Pulse   99





Oximetry   














  03/12/18 03/12/18 03/12/18





  09:47 11:45 11:53


 


Temperature  101.4 F 99.7 F


 


Pulse Rate  88 87


 


Respiratory 36 40 32





Rate   


 


Blood Pressure  140/74 156/81





(mmHg)   


 


O2 Sat by Pulse  98 96





Oximetry   














  03/12/18 03/12/18 03/12/18





  11:55 12:13 13:43


 


Temperature   99.7 F


 


Pulse Rate   81


 


Respiratory 32 34 40





Rate   


 


Blood Pressure   152/76





(mmHg)   


 


O2 Sat by Pulse   97





Oximetry   














  03/12/18 03/12/18 03/12/18





  15:00 15:05 15:09


 


Temperature   


 


Pulse Rate 88 86 


 


Respiratory 31 30 40





Rate   


 


Blood Pressure 177/91 176/91 





(mmHg)   


 


O2 Sat by Pulse 97 96 





Oximetry   














  03/12/18 03/12/18 03/12/18





  15:10 15:21 15:25


 


Temperature   


 


Pulse Rate 73 75 76


 


Respiratory 9  





Rate   


 


Blood Pressure 181/83 123/69 124/72





(mmHg)   


 


O2 Sat by Pulse 93 98 100





Oximetry   














  03/12/18 03/12/18 03/12/18





  15:30 15:35 15:41


 


Temperature   97.9 F


 


Pulse Rate 74 78 78


 


Respiratory   





Rate   


 


Blood Pressure 132/77 119/67 139/78





(mmHg)   


 


O2 Sat by Pulse 97 99 98





Oximetry   














  03/12/18 03/12/18 03/12/18





  15:45 15:50 16:00


 


Temperature 98.8 F 99.3 F 99.9 F


 


Pulse Rate 80 78 82


 


Respiratory   





Rate   


 


Blood Pressure 140/76 135/78 116/70





(mmHg)   


 


O2 Sat by Pulse 99 100 99





Oximetry   














  03/12/18 03/12/18 03/12/18





  16:16 16:18 16:30


 


Temperature 100.0 F 100.2 F 100.2 F


 


Pulse Rate 79 79 79


 


Respiratory  31 





Rate   


 


Blood Pressure 154/83 177/91 114/72





(mmHg)   


 


O2 Sat by Pulse 100 97 100





Oximetry   














  03/12/18 03/12/18 03/12/18





  16:45 17:00 17:15


 


Temperature 100.2 F 100.0 F 100.0 F


 


Pulse Rate 78 78 81


 


Respiratory  14 





Rate   


 


Blood Pressure 130/76 140/84 132/73





(mmHg)   


 


O2 Sat by Pulse 100 100 100





Oximetry   














  03/12/18 03/12/18 03/12/18





  17:30 17:45 18:00


 


Temperature 100.0 F 100.0 F 100.0 F


 


Pulse Rate 78 77 80


 


Respiratory   19





Rate   


 


Blood Pressure 115/71 124/75 139/80





(mmHg)   


 


O2 Sat by Pulse 99 99 100





Oximetry   














  03/12/18 03/12/18 03/12/18





  18:16 18:30 18:45


 


Temperature 100.0 F 100.0 F 100.0 F


 


Pulse Rate 85 83 80


 


Respiratory   





Rate   


 


Blood Pressure 161/88 138/73 126/65





(mmHg)   


 


O2 Sat by Pulse 99 99 99





Oximetry   














  03/12/18 03/12/18 03/12/18





  19:00 19:15 19:30


 


Temperature 100.0 F 100.0 F 99.9 F


 


Pulse Rate 80 80 89


 


Respiratory 21  





Rate   


 


Blood Pressure 111/64 90/54 88/53





(mmHg)   


 


O2 Sat by Pulse 98 97 97





Oximetry   














  03/12/18 03/12/18 03/12/18





  19:38 20:00 20:15


 


Temperature 99.9 F 99.9 F 99.9 F


 


Pulse Rate 88 87 85


 


Respiratory  22 





Rate   


 


Blood Pressure 92/55 95/53 87/54





(mmHg)   


 


O2 Sat by Pulse 97 97 96





Oximetry   














  03/12/18 03/12/18 03/12/18





  20:30 20:42 20:45


 


Temperature 99.7 F 99.7 F 99.7 F


 


Pulse Rate 88 87 87


 


Respiratory   





Rate   


 


Blood Pressure 83/47 85/49 85/52





(mmHg)   


 


O2 Sat by Pulse 96 96 97





Oximetry   














  03/12/18 03/12/18 03/12/18





  21:00 21:15 21:30


 


Temperature 99.5 F 99.5 F 99.3 F


 


Pulse Rate 88 89 88


 


Respiratory 22  





Rate   


 


Blood Pressure 87/51 86/52 96/55





(mmHg)   


 


O2 Sat by Pulse 97 97 98





Oximetry   














  03/12/18 03/12/18 03/12/18





  21:45 22:00 22:15


 


Temperature 99.1 F 99.0 F 98.8 F


 


Pulse Rate 86 86 79


 


Respiratory  22 





Rate   


 


Blood Pressure 96/57 103/59 115/67





(mmHg)   


 


O2 Sat by Pulse 98 99 99





Oximetry   














  03/12/18 03/12/18 03/12/18





  22:24 22:30 22:45


 


Temperature  98.6 F 98.6 F


 


Pulse Rate  75 76


 


Respiratory 22  





Rate   


 


Blood Pressure  123/72 125/69





(mmHg)   


 


O2 Sat by Pulse  99 99





Oximetry   














  03/12/18 03/12/18 03/12/18





  23:00 23:03 23:15


 


Temperature 98.8 F 98.8 F 98.8 F


 


Pulse Rate 77 79 78


 


Respiratory 21  





Rate   


 


Blood Pressure 117/67  108/63





(mmHg)   


 


O2 Sat by Pulse 98 98 98





Oximetry   














  03/12/18 03/12/18 03/13/18





  23:30 23:45 00:00


 


Temperature 99.0 F 99.1 F 99.0 F


 


Pulse Rate 78 78 82


 


Respiratory   21





Rate   


 


Blood Pressure 108/61 103/61 91/53





(mmHg)   


 


O2 Sat by Pulse 98 95 94





Oximetry   














  03/13/18 03/13/18 03/13/18





  00:15 00:30 00:45


 


Temperature 99.0 F 99.0 F 99.0 F


 


Pulse Rate  76 77


 


Respiratory   





Rate   


 


Blood Pressure 91/52 111/65 102/59





(mmHg)   


 


O2 Sat by Pulse  97 96





Oximetry   














  03/13/18 03/13/18 03/13/18





  01:00 01:15 01:30


 


Temperature 99.0 F 99.0 F 99.1 F


 


Pulse Rate 82 81 82


 


Respiratory 30  





Rate   


 


Blood Pressure 99/59 103/58 97/60





(mmHg)   


 


O2 Sat by Pulse 96 96 96





Oximetry   














  03/13/18 03/13/18 03/13/18





  01:45 02:00 02:15


 


Temperature 99.1 F 99.1 F 99.1 F


 


Pulse Rate 82 82 80


 


Respiratory  25 





Rate   


 


Blood Pressure 100/61 101/58 111/67





(mmHg)   


 


O2 Sat by Pulse 95 95 97





Oximetry   














  03/13/18 03/13/18 03/13/18





  02:30 02:45 02:54


 


Temperature 99.0 F 99.1 F 


 


Pulse Rate 82 88 


 


Respiratory   34





Rate   


 


Blood Pressure 142/85 136/86 





(mmHg)   


 


O2 Sat by Pulse 99 100 





Oximetry   














  03/13/18 03/13/18 03/13/18





  03:00 03:15 03:30


 


Temperature 99.3 F 99.3 F 99.3 F


 


Pulse Rate 79 80 79


 


Respiratory 26  





Rate   


 


Blood Pressure 128/66 104/62 88/50





(mmHg)   


 


O2 Sat by Pulse 97 95 92





Oximetry   














  03/13/18 03/13/18 03/13/18





  03:46 04:00 04:15


 


Temperature 99.3 F 99.1 F 99.0 F


 


Pulse Rate 78 77 85


 


Respiratory  30 





Rate   


 


Blood Pressure 82/47 84/46 92/57





(mmHg)   


 


O2 Sat by Pulse 92 92 94





Oximetry   














  03/13/18 03/13/18 03/13/18





  04:30 04:45 05:00


 


Temperature 98.8 F 98.4 F 98.1 F


 


Pulse Rate 81 73 79


 


Respiratory   22





Rate   


 


Blood Pressure 97/56 99/57 105/63





(mmHg)   


 


O2 Sat by Pulse 95 96 97





Oximetry   














  03/13/18 03/13/18 03/13/18





  05:15 05:30 05:42


 


Temperature 97.9 F 98.1 F 


 


Pulse Rate 72 80 


 


Respiratory   29





Rate   


 


Blood Pressure 117/65 145/81 





(mmHg)   


 


O2 Sat by Pulse 98 96 





Oximetry   














  03/13/18 03/13/18 03/13/18





  05:45 06:00 07:00


 


Temperature 98.2 F 98.6 F 99.5 F


 


Pulse Rate 82 79 80


 


Respiratory  25 





Rate   


 


Blood Pressure 154/84  





(mmHg)   


 


O2 Sat by Pulse 96 95 93





Oximetry   











Oxygen Devices in Use Now: Mechanical Ventilator


Neurology Exam: 





General: 





HEENT: Normocephalic, facial trauma with scattered lacerations





Neck: In c-collar





Chest: Clear to auscultation bilaterally 





Cardiovascular: Regular rate and rhythm without murmurs, rubs, gallops





Abdomen: Distended but soft





Extremities: No cyanosis.  1+ non-pitting edema in the feet to ankles 

bilaterally.








Neurological Findings: 





Sedated on propofol which was turned off at 7:20 am





(Immediately off sedation)


Dose not arouse to loud noise or sternal rub


Speech: ET tube in place


Cranial Nerve: Pupils 2mm to 1mm, sluggish, symmetric.  Face appears grossly 

symmetric, No blink to threat, Could not Doll's eye due to C-collar


Motor: No spontaneous movement


Sensation: Does not withdraw to pain X 4.  


Deep Tendon Reflex: Upgoing Babinski bilaterally, 3+ BC, BR bilaterrally, 3+ 

patella, 1+ ankles bilaterally





(Off sedation X 15-20 min)


Biting ET, coughing, positive gag with deep suction


Pupils: 3mm to 2mm, sluggish, symmetric,  opens eyes will not track, minimal 

blink to threat


Arouses to loud noise, painful stimuli


Nods occasionally to questioning


Painful stimuli:  Moves right arm slightly, no significant w/d X 4, winces with 

pain in the upper extremities.


No spontaneous movement X 4, will wiggle toes to command, no movement of upper 

extremities to command


DTRs:  unchanged, upgoing Babinski











Result Diagrams: 


 03/13/18 07:16





 03/13/18 07:16


Additional Lab and Data: 


 








Assessment/Plan


Assessment: 





62 year old with history of HTN, heavy EtOH abuse, last drink March 8th, status 

post fall with neck and facial injury.  Currently sedated but previous exams 

with bilaterally upper extremity weakness, bilateral wrist drop.  C-spine MRI 

shows significant stenosis at C3-6, concern for spinal cord injury





--Weakness:


   --At this point, he remains sedated for agitation.  He did awaken off 

propofol but shows no significant movement of the upper extremities.  It is 

difficult to tell if this is prolonged propofol effect, encephalopathy 

secondary to EtOH use or related to spinal cord injury. 


   --Once he is weaned from propofol, we can get a more reliable examination.





--Neurosurgery following, no intervention at this point.  Continue C-collar.  

Small sub-dural seen on MRI likely not contributing to symptoms.  Steroids?





--EtOH W/D.  Now 6 days out from last drink.  Did receive BZD last night for 

agitation.  


   -EtOH w/d encephalopathy could be playing a role, no reported seizure like 

activity.  Ammonia 55-->56-->57.


   -Minimizing prn BZD as much as possible.  Off taper


   -Continue medical management and supportive care. 


   -On Thiamine





--Low grade fevers:  WBC down today. Source unclear.  Continue to monitor





12:45 pm





Came back by to check on patient.  Remains on propofol. currently 35mcg. Fever. 

Spoke with Dr. Montes:  Plan is to keep him sedated today, possible tomorrow 

and then wean off propofol.  Once he is off, we will be able to get a better 

exam.

## 2018-03-14 LAB
BASOPHILS # BLD AUTO: 0 10^3/UL (ref 0–0.2)
EOSINOPHIL # BLD AUTO: 0 10^3/UL (ref 0–0.6)
HCT VFR BLD AUTO: 36 % (ref 42–52)
HGB BLD-MCNC: 12.1 G/DL (ref 14–18)
LYMPHOCYTES # BLD AUTO: 0.4 10^3/UL (ref 1–4.8)
MCH RBC QN AUTO: 34 PG (ref 27–31)
MCHC RBC AUTO-ENTMCNC: 34 G/DL (ref 31–36)
MCV RBC AUTO: 100 FL (ref 80–94)
MONOCYTES # BLD AUTO: 0.4 10^3/UL (ref 0–0.8)
NEUTROPHILS # BLD AUTO: 7.4 10^3/UL (ref 1.5–7.7)
NRBC # BLD AUTO: 0 10^3/UL
NRBC BLD QL AUTO: 0
PLATELET # BLD AUTO: 386 10^3/UL (ref 150–450)
RBC # BLD AUTO: 3.56 10^6/UL (ref 4–5.4)
WBC # BLD AUTO: 8.1 10^3/UL (ref 3.5–10.8)

## 2018-03-14 RX ADMIN — CHLORHEXIDINE GLUCONATE 0.12% ORAL RINSE SCH ML: 1.2 LIQUID ORAL at 22:35

## 2018-03-14 RX ADMIN — DEXMEDETOMIDINE HYDROCHLORIDE SCH MLS/HR: 100 INJECTION, SOLUTION, CONCENTRATE INTRAVENOUS at 17:38

## 2018-03-14 RX ADMIN — CHLORHEXIDINE GLUCONATE 0.12% ORAL RINSE SCH ML: 1.2 LIQUID ORAL at 06:29

## 2018-03-14 RX ADMIN — CHLORHEXIDINE GLUCONATE 0.12% ORAL RINSE SCH ML: 1.2 LIQUID ORAL at 17:38

## 2018-03-14 RX ADMIN — Medication SCH MG: at 08:36

## 2018-03-14 RX ADMIN — METOPROLOL TARTRATE SCH: 5 INJECTION, SOLUTION INTRAVENOUS at 02:57

## 2018-03-14 RX ADMIN — FAMOTIDINE SCH MG: 10 INJECTION, SOLUTION INTRAVENOUS at 08:36

## 2018-03-14 RX ADMIN — DEXAMETHASONE SODIUM PHOSPHATE SCH MG: 4 INJECTION, SOLUTION INTRAMUSCULAR; INTRAVENOUS at 14:16

## 2018-03-14 RX ADMIN — FOLIC ACID SCH MG: 1 TABLET ORAL at 08:36

## 2018-03-14 RX ADMIN — DEXMEDETOMIDINE HYDROCHLORIDE SCH MLS/HR: 100 INJECTION, SOLUTION, CONCENTRATE INTRAVENOUS at 22:05

## 2018-03-14 RX ADMIN — SODIUM CHLORIDE SCH MLS/HR: 900 IRRIGANT IRRIGATION at 06:34

## 2018-03-14 RX ADMIN — METOPROLOL TARTRATE SCH MG: 5 INJECTION, SOLUTION INTRAVENOUS at 08:36

## 2018-03-14 RX ADMIN — CHLORHEXIDINE GLUCONATE 0.12% ORAL RINSE SCH ML: 1.2 LIQUID ORAL at 14:19

## 2018-03-14 RX ADMIN — INSULIN LISPRO SCH UNIT: 100 INJECTION, SOLUTION INTRAVENOUS; SUBCUTANEOUS at 22:35

## 2018-03-14 RX ADMIN — DEXMEDETOMIDINE HYDROCHLORIDE SCH MLS/HR: 100 INJECTION, SOLUTION, CONCENTRATE INTRAVENOUS at 14:28

## 2018-03-14 RX ADMIN — CHLORHEXIDINE GLUCONATE 0.12% ORAL RINSE SCH ML: 1.2 LIQUID ORAL at 11:23

## 2018-03-14 RX ADMIN — METOPROLOL TARTRATE SCH MG: 5 INJECTION, SOLUTION INTRAVENOUS at 14:19

## 2018-03-14 RX ADMIN — WATER SCH NOTE: 100 INJECTION, SOLUTION INTRAVENOUS at 20:32

## 2018-03-14 RX ADMIN — DEXAMETHASONE SODIUM PHOSPHATE SCH MG: 4 INJECTION, SOLUTION INTRAMUSCULAR; INTRAVENOUS at 17:38

## 2018-03-14 RX ADMIN — PROPOFOL SCH MLS/HR: 10 INJECTION, EMULSION INTRAVENOUS at 20:23

## 2018-03-14 RX ADMIN — PROPOFOL SCH MLS/HR: 10 INJECTION, EMULSION INTRAVENOUS at 04:26

## 2018-03-14 RX ADMIN — DEXMEDETOMIDINE HYDROCHLORIDE SCH MLS/HR: 100 INJECTION, SOLUTION, CONCENTRATE INTRAVENOUS at 13:22

## 2018-03-14 RX ADMIN — CHLORHEXIDINE GLUCONATE 0.12% ORAL RINSE SCH ML: 1.2 LIQUID ORAL at 02:56

## 2018-03-14 RX ADMIN — HEPARIN SODIUM SCH UNITS: 5000 INJECTION INTRAVENOUS; SUBCUTANEOUS at 14:19

## 2018-03-14 RX ADMIN — HEPARIN SODIUM SCH UNITS: 5000 INJECTION INTRAVENOUS; SUBCUTANEOUS at 22:35

## 2018-03-14 RX ADMIN — DEXAMETHASONE SODIUM PHOSPHATE SCH MG: 4 INJECTION, SOLUTION INTRAMUSCULAR; INTRAVENOUS at 06:29

## 2018-03-14 RX ADMIN — Medication SCH MG: at 14:19

## 2018-03-14 RX ADMIN — Medication SCH MG: at 20:32

## 2018-03-14 RX ADMIN — DEXAMETHASONE SODIUM PHOSPHATE SCH MG: 4 INJECTION, SOLUTION INTRAMUSCULAR; INTRAVENOUS at 02:56

## 2018-03-14 RX ADMIN — HEPARIN SODIUM SCH UNITS: 5000 INJECTION INTRAVENOUS; SUBCUTANEOUS at 06:28

## 2018-03-14 RX ADMIN — METOPROLOL TARTRATE SCH MG: 5 INJECTION, SOLUTION INTRAVENOUS at 20:32

## 2018-03-14 RX ADMIN — NICOTINE SCH PATCH: 21 PATCH TRANSDERMAL at 08:36

## 2018-03-14 NOTE — PN
Progress Note





- Progress Note


Date of Service: 03/14/18


Note: 





CRITICAL CARE MEDICINE


DATE: 3/14/18      TIME: 1035 





SUBJECTIVE: Patient seen and examined. 





PHYSICAL EXAM:


Vital Signs: Reviewed. 


Neurologic: hi but wrist weak. off prop, c/o overall pain. 


HEENT: anicteric, perrl, ett. +cuff leak. ogt


Cardiovascular: distant, reg S1 S2


Respiratory: coarse with R sided rhonchi. 25%. MV 13lpm


Abdomen: soft, nt


Extremities: dep edema.


Access: piv 





LABS: Reviewed.


IMAGING: Reviewed.


MEDICATIONS: Reviewed.





ASSESSMENT: 62 M


Acute hypoxic resp failure


Acute alcohol withdrawal


Benzo use combating withdrawal


Cervical spine ligamentous injury 


?C7 involvement


Small sdh


Tob use


Etoh use





PLAN:


Neurologic: stabilized. at least hi.  off prop. trial precedex. prn pain 

management.  


Cardiovascular: perfusing. ivf off. mild fluid overload. allow him to mobilize. 


Respiratory: maintain intubation this am.  cpap and if mentation conitnues to 

improve and mv relaxes can look to liberate as soon as today, but may still 

wait till tomorrow. keeping steroids today.  


Gastrointestinal: ogt. tf. sup. lactulose


Renal/Metabolic: lytes maintained


Infectious Disease: no infective burden. 


Hematology: stable. hsq


Endocrine: f/u bg ok


Musculoskeletal: f/u pt, rehabs needs


Psych/Social: daughter and pts brother at bedside updated





Supportive and preventative care as ordered.





SUP: H2


VTE prophylaxis: heparin


Mary catheter given critical illness, monitoring needs for accurate assessment 

of ANTHONY and KDIGO criteria for critically ill patients and to avoid potential 

harms of urinary retention, skin breakdown/ulcers.


Disposition: ICU


Code Status: Full


Critical Care Time: 35min.


LEROY Montes DO

## 2018-03-14 NOTE — CONS
NEUROLOGY FOLLOWUP:

 

DATE OF FOLLOWUP:  03/14/15

 

INTENSIVIST:  Dr. Montes.

 

LOCATION:  He is in ICU bed 5.

 

CHIEF COMPLAINT:  Weakness, delirium.

 

INTERVAL HISTORY:  Since yesterday, Tanner has remained on propofol and 
intubated. Currently, propofol is infusing as he was quite agitated and 
reaching for his tubes.  His nurses said that he moves his legs pretty 
vigorously and tries to reach the tube with his arms.

 

MEDICATIONS:  Reviewed.

1.  In addition to propofol, he is on dexamethasone 4 mg IV q.6 hours.

2.  Precedex.

3.  Famotidine IV 20 mg daily.

4.  Fentanyl 25 mg IV q.2 hours p.r.n.

5.  Folic acid 1 mg daily NG tube.

6.  Heparin 5000 units subcu q.8 hours.

7.  Lactulose 30 mL NG tube b.i.d.

8.  Metoprolol 5 mg IV q.6 hours.

 

PHYSICAL EXAM:  He is heavily sedated.  Temperature 98.8 by Mary probe, blood 
pressure 138/79, heart rate in the 60s.

 

He has multiple healing abrasions on his face and legs.  He has decreased 
muscle tone in all limbs.  He has bilateral Babinski signs.  He restlessly 
moves his legs when stimulated.

 

IMPRESSION:  Cervical cord injury and delirium.  Hopefully, he will be well 
enough tomorrow to be extubated and get a better assessment of his motor 
function.  His laboratories are reviewed and ammonia level is 57 yesterday.  I 
will plan on examining him in more detail tomorrow.

 

 [id number]/CPS #: [id number]

MTDDUC

## 2018-03-15 LAB
BASOPHILS # BLD AUTO: 0.1 10^3/UL (ref 0–0.2)
EOSINOPHIL # BLD AUTO: 0 10^3/UL (ref 0–0.6)
HCT VFR BLD AUTO: 38 % (ref 42–52)
HGB BLD-MCNC: 13 G/DL (ref 14–18)
LYMPHOCYTES # BLD AUTO: 0.5 10^3/UL (ref 1–4.8)
MCH RBC QN AUTO: 34 PG (ref 27–31)
MCHC RBC AUTO-ENTMCNC: 34 G/DL (ref 31–36)
MCV RBC AUTO: 99 FL (ref 80–94)
MONOCYTES # BLD AUTO: 0.8 10^3/UL (ref 0–0.8)
NEUTROPHILS # BLD AUTO: 11 10^3/UL (ref 1.5–7.7)
NRBC # BLD AUTO: 0 10^3/UL
NRBC BLD QL AUTO: 0
PLATELET # BLD AUTO: 465 10^3/UL (ref 150–450)
RBC # BLD AUTO: 3.85 10^6/UL (ref 4–5.4)
WBC # BLD AUTO: 12.3 10^3/UL (ref 3.5–10.8)

## 2018-03-15 RX ADMIN — METOPROLOL TARTRATE SCH MG: 5 INJECTION, SOLUTION INTRAVENOUS at 20:54

## 2018-03-15 RX ADMIN — LORAZEPAM PRN MG: 2 INJECTION INTRAMUSCULAR; INTRAVENOUS at 20:54

## 2018-03-15 RX ADMIN — Medication SCH MG: at 21:15

## 2018-03-15 RX ADMIN — HEPARIN SODIUM SCH UNITS: 5000 INJECTION INTRAVENOUS; SUBCUTANEOUS at 05:31

## 2018-03-15 RX ADMIN — METOPROLOL TARTRATE SCH MG: 5 INJECTION, SOLUTION INTRAVENOUS at 14:48

## 2018-03-15 RX ADMIN — PROPOFOL SCH MLS/HR: 10 INJECTION, EMULSION INTRAVENOUS at 01:45

## 2018-03-15 RX ADMIN — HEPARIN SODIUM SCH UNITS: 5000 INJECTION INTRAVENOUS; SUBCUTANEOUS at 14:48

## 2018-03-15 RX ADMIN — FAMOTIDINE SCH MG: 10 INJECTION, SOLUTION INTRAVENOUS at 07:51

## 2018-03-15 RX ADMIN — INSULIN LISPRO SCH UNIT: 100 INJECTION, SOLUTION INTRAVENOUS; SUBCUTANEOUS at 01:44

## 2018-03-15 RX ADMIN — PROPOFOL SCH MLS/HR: 10 INJECTION, EMULSION INTRAVENOUS at 06:47

## 2018-03-15 RX ADMIN — NICOTINE SCH PATCH: 21 PATCH TRANSDERMAL at 07:51

## 2018-03-15 RX ADMIN — INSULIN LISPRO SCH: 100 INJECTION, SOLUTION INTRAVENOUS; SUBCUTANEOUS at 14:53

## 2018-03-15 RX ADMIN — LORAZEPAM PRN MG: 2 INJECTION INTRAMUSCULAR; INTRAVENOUS at 23:58

## 2018-03-15 RX ADMIN — Medication SCH MG: at 14:48

## 2018-03-15 RX ADMIN — DEXMEDETOMIDINE HYDROCHLORIDE SCH: 100 INJECTION, SOLUTION, CONCENTRATE INTRAVENOUS at 14:40

## 2018-03-15 RX ADMIN — DEXAMETHASONE SODIUM PHOSPHATE SCH MG: 4 INJECTION, SOLUTION INTRAMUSCULAR; INTRAVENOUS at 01:44

## 2018-03-15 RX ADMIN — LORAZEPAM PRN MG: 2 INJECTION INTRAMUSCULAR; INTRAVENOUS at 19:45

## 2018-03-15 RX ADMIN — WATER SCH NOTE: 100 INJECTION, SOLUTION INTRAVENOUS at 21:12

## 2018-03-15 RX ADMIN — METOPROLOL TARTRATE SCH MG: 5 INJECTION, SOLUTION INTRAVENOUS at 01:44

## 2018-03-15 RX ADMIN — DEXAMETHASONE SODIUM PHOSPHATE SCH MG: 4 INJECTION, SOLUTION INTRAMUSCULAR; INTRAVENOUS at 05:31

## 2018-03-15 RX ADMIN — DEXMEDETOMIDINE HYDROCHLORIDE SCH MLS/HR: 100 INJECTION, SOLUTION, CONCENTRATE INTRAVENOUS at 08:50

## 2018-03-15 RX ADMIN — CHLORHEXIDINE GLUCONATE 0.12% ORAL RINSE SCH: 1.2 LIQUID ORAL at 12:16

## 2018-03-15 RX ADMIN — CHLORHEXIDINE GLUCONATE 0.12% ORAL RINSE SCH ML: 1.2 LIQUID ORAL at 07:51

## 2018-03-15 RX ADMIN — Medication SCH MG: at 07:52

## 2018-03-15 RX ADMIN — DEXMEDETOMIDINE HYDROCHLORIDE SCH MLS/HR: 100 INJECTION, SOLUTION, CONCENTRATE INTRAVENOUS at 05:38

## 2018-03-15 RX ADMIN — HEPARIN SODIUM SCH UNITS: 5000 INJECTION INTRAVENOUS; SUBCUTANEOUS at 21:17

## 2018-03-15 RX ADMIN — METOPROLOL TARTRATE SCH MG: 5 INJECTION, SOLUTION INTRAVENOUS at 07:51

## 2018-03-15 RX ADMIN — DEXAMETHASONE SODIUM PHOSPHATE SCH: 4 INJECTION, SOLUTION INTRAMUSCULAR; INTRAVENOUS at 14:40

## 2018-03-15 RX ADMIN — FOLIC ACID SCH MG: 1 TABLET ORAL at 07:52

## 2018-03-15 RX ADMIN — INSULIN LISPRO SCH UNIT: 100 INJECTION, SOLUTION INTRAVENOUS; SUBCUTANEOUS at 06:18

## 2018-03-15 RX ADMIN — INSULIN LISPRO SCH UNIT: 100 INJECTION, SOLUTION INTRAVENOUS; SUBCUTANEOUS at 09:00

## 2018-03-15 RX ADMIN — CHLORHEXIDINE GLUCONATE 0.12% ORAL RINSE SCH ML: 1.2 LIQUID ORAL at 03:50

## 2018-03-15 RX ADMIN — DEXMEDETOMIDINE HYDROCHLORIDE SCH MLS/HR: 100 INJECTION, SOLUTION, CONCENTRATE INTRAVENOUS at 01:46
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
## 2018-03-15 NOTE — PN
Progress Note





- Progress Note


Date of Service: 03/15/18


Note: 





CRITICAL CARE MEDICINE


DATE: 3/15/18      TIME: 1100





SUBJECTIVE: Patient seen and examined. 





PHYSICAL EXAM:


Vital Signs: Reviewed. 


Neurologic: hi but wrist weak. off prop and waiting for more response.


HEENT: anicteric, perrl, ett. +cuff leak. ogt


Cardiovascular: distant, reg S1 S2


Respiratory: mild R sided rhonchi. 21%. changed to aprv to quick recruit this 

am. MV 11lpm as he awakes from sedation


Abdomen: soft, nt


Extremities: dep edema.


Access: piv 





LABS: Reviewed.


IMAGING: Reviewed.


MEDICATIONS: Reviewed.





ASSESSMENT: 62 M


Acute hypoxic resp failure


Acute alcohol withdrawal


Benzo use combating withdrawal


Cervical spine ligamentous injury 


?C7 involvement


Small sdh


Tob use


Etoh use





PLAN:


Neurologic: stable.  collar. hi.  off prop. keep precedex thru extubation.  

prn pain management.  neuro f/u today


Cardiovascular: perfusing. fluid ok


Respiratory: liberate today. airway obs. can come off steroids later today. 


Gastrointestinal: ogt. tf held. sup. ammonia still up and can f/u lactulose 

needs


Renal/Metabolic: lytes repleted


Infectious Disease: no infective burden. 


Hematology: stable. hsq


Endocrine: bg up post steroids. ssi


Musculoskeletal: f/u pt, rehabs needs


Psych/Social: daughter and pts brother at bedside updated





Supportive and preventative care as ordered.





SUP: H2


VTE prophylaxis: heparin


Mary catheter given critical illness, monitoring needs for accurate assessment 

of ANTHONY and KDIGO criteria for critically ill patients and to avoid potential 

harms of urinary retention, skin breakdown/ulcers.


Disposition: ICU


Code Status: Full


Critical Care Time: 35min.


LEROY Montes DO

## 2018-03-16 LAB
HCT VFR BLD AUTO: 38 % (ref 42–52)
HGB BLD-MCNC: 13 G/DL (ref 14–18)
MCH RBC QN AUTO: 34 PG (ref 27–31)
MCHC RBC AUTO-ENTMCNC: 34 G/DL (ref 31–36)
MCV RBC AUTO: 100 FL (ref 80–94)
PLATELET # BLD AUTO: 497 10^3/UL (ref 150–450)
RBC # BLD AUTO: 3.82 10^6/UL (ref 4–5.4)
WBC # BLD AUTO: 9.5 10^3/UL (ref 3.5–10.8)

## 2018-03-16 RX ADMIN — Medication SCH MG: at 20:02

## 2018-03-16 RX ADMIN — METOPROLOL TARTRATE SCH MG: 50 TABLET, FILM COATED ORAL at 11:23

## 2018-03-16 RX ADMIN — HEPARIN SODIUM SCH: 5000 INJECTION INTRAVENOUS; SUBCUTANEOUS at 04:56

## 2018-03-16 RX ADMIN — NICOTINE SCH PATCH: 21 PATCH TRANSDERMAL at 11:23

## 2018-03-16 RX ADMIN — ACETAMINOPHEN PRN MG: 325 TABLET ORAL at 20:01

## 2018-03-16 RX ADMIN — METOPROLOL TARTRATE SCH MG: 5 INJECTION, SOLUTION INTRAVENOUS at 08:48

## 2018-03-16 RX ADMIN — WATER SCH: 100 INJECTION, SOLUTION INTRAVENOUS at 20:03

## 2018-03-16 RX ADMIN — Medication SCH MG: at 08:48

## 2018-03-16 RX ADMIN — FENTANYL CITRATE PRN MCG: 0.05 INJECTION, SOLUTION INTRAMUSCULAR; INTRAVENOUS at 19:58

## 2018-03-16 RX ADMIN — HEPARIN SODIUM SCH UNITS: 5000 INJECTION INTRAVENOUS; SUBCUTANEOUS at 14:16

## 2018-03-16 RX ADMIN — Medication SCH MG: at 14:16

## 2018-03-16 RX ADMIN — METOPROLOL TARTRATE SCH MG: 5 INJECTION, SOLUTION INTRAVENOUS at 03:50

## 2018-03-16 RX ADMIN — HEPARIN SODIUM SCH UNITS: 5000 INJECTION INTRAVENOUS; SUBCUTANEOUS at 04:51

## 2018-03-16 RX ADMIN — FOLIC ACID SCH MG: 1 TABLET ORAL at 08:48

## 2018-03-16 RX ADMIN — HEPARIN SODIUM SCH UNITS: 5000 INJECTION INTRAVENOUS; SUBCUTANEOUS at 20:02

## 2018-03-16 RX ADMIN — LORAZEPAM PRN MG: 2 INJECTION INTRAMUSCULAR; INTRAVENOUS at 21:51

## 2018-03-16 RX ADMIN — METOPROLOL TARTRATE SCH MG: 50 TABLET, FILM COATED ORAL at 20:01

## 2018-03-16 NOTE — PN
Progress Note





- Progress Note


Date of Service: 03/16/18


Note: 





CRITICAL CARE MEDICINE


DATE: 3/16/18      TIME: 930





SUBJECTIVE: Patient seen and examined. 





PHYSICAL EXAM:


Vital Signs: Reviewed. 


Neurologic: hi but wrist weak bl but somewhat better and more digit movement.


HEENT: anicteric, perrl, no stridor. 


Cardiovascular: distant, reg S1 S2


Respiratory: clear. coming off o2


Abdomen: soft, nt


Extremities: mild dep edema; chronic poor circulation


Access: piv 





LABS: Reviewed.


IMAGING: Reviewed.


MEDICATIONS: Reviewed.





ASSESSMENT: 62 M


Acute hypoxic resp failure - recovered


Acute alcohol withdrawal - improved


Benzo use combating withdrawal - clearing


Hepatic encephalopathy


Cervical spine ligamentous injury ?C7 involvement


Small sdh


Tob use


Etoh use





PLAN:


Neurologic: stable. collar. neuro/nsgy f/u. question when f/u imaging needs


Cardiovascular: perfusing. fluid ok


Respiratory: huy well. wean off o2


Gastrointestinal: advance diet. lactulose can change to daily. 


Renal/Metabolic: replete lytes 


Infectious Disease: no infective burden. 


Hematology: stable. hsq


Endocrine: off steroids. off ssi. 


Musculoskeletal: pt, rehabs needs


Psych/Social: daughter and pts brother at bedside updated





Supportive and preventative care as ordered.





SUP: H2


VTE prophylaxis: heparin


Mary catheter out later


Disposition: to floor


Code Status: Full


Critical Care Time: 30min.


LEROY Montes DO

## 2018-03-17 RX ADMIN — HEPARIN SODIUM SCH UNITS: 5000 INJECTION INTRAVENOUS; SUBCUTANEOUS at 05:55

## 2018-03-17 RX ADMIN — Medication SCH MG: at 20:57

## 2018-03-17 RX ADMIN — METOPROLOL TARTRATE SCH MG: 50 TABLET, FILM COATED ORAL at 08:45

## 2018-03-17 RX ADMIN — HEPARIN SODIUM SCH UNITS: 5000 INJECTION INTRAVENOUS; SUBCUTANEOUS at 20:58

## 2018-03-17 RX ADMIN — Medication SCH MG: at 13:05

## 2018-03-17 RX ADMIN — NICOTINE SCH PATCH: 21 PATCH TRANSDERMAL at 08:45

## 2018-03-17 RX ADMIN — WATER SCH NOTE: 100 INJECTION, SOLUTION INTRAVENOUS at 21:00

## 2018-03-17 RX ADMIN — METOPROLOL TARTRATE SCH MG: 50 TABLET, FILM COATED ORAL at 20:56

## 2018-03-17 RX ADMIN — HEPARIN SODIUM SCH UNITS: 5000 INJECTION INTRAVENOUS; SUBCUTANEOUS at 13:05

## 2018-03-17 RX ADMIN — FENTANYL CITRATE PRN MCG: 0.05 INJECTION, SOLUTION INTRAMUSCULAR; INTRAVENOUS at 14:52

## 2018-03-17 RX ADMIN — ACETAMINOPHEN PRN MG: 325 TABLET ORAL at 10:32

## 2018-03-17 RX ADMIN — FOLIC ACID SCH MG: 1 TABLET ORAL at 08:45

## 2018-03-17 RX ADMIN — Medication SCH MG: at 08:45

## 2018-03-17 RX ADMIN — OMEPRAZOLE SCH MG: 20 CAPSULE, DELAYED RELEASE ORAL at 05:55

## 2018-03-17 NOTE — PN
Subjective


Date of Service: 03/17/18


Interval History: 





Pt feels well. As per RN required Ryann lift yesterday, but today will try to 

get him OOB with assist.


still has rectal tube in with a lot of output of large amounts of loose stool. 

Mary in place


Family History: Unchanged from Admission


Social History: Unchanged from Admission


Past Medical History: Unchanged from Admission





Objective


Active Medications: 








Acetaminophen (Tylenol Tab*)  650 mg PO Q6H PRN


   PRN Reason: FEVER/PAIN


   Last Admin: 03/16/18 20:01 Dose:  650 mg


Device (Nicotine Mouth Piece*)  1 each INH .USE WITH NICOTROL PRN


   PRN Reason: CRAVING


Fentanyl Citrate (Fentanyl*)  25 mcg IV SLOW PU Q4H PRN


   PRN Reason: PAIN - MODERATE


   Last Admin: 03/16/18 19:58 Dose:  25 mcg


Folic Acid (Folvite Tab*)  1 mg PO DAILY [facility]


   Last Admin: 03/17/18 08:45 Dose:  1 mg


Heparin Sodium (Porcine) (Heparin Vial(*))  5,000 units SUBCUT Q8HR [facility]


   Last Admin: 03/17/18 05:55 Dose:  5,000 units


Lactulose (Lactulose*)  30 ml PO DAILY [facility]


   Last Admin: 03/17/18 08:45 Dose:  30 ml


Lorazepam (Ativan Inj*)  0.5 mg IV PUSH Q4H PRN


   PRN Reason: ANXIETY


   Last Admin: 03/16/18 21:51 Dose:  0.5 mg


Metoprolol Tartrate (Lopressor Tab*)  50 mg PO BID [facility]


   Last Admin: 03/17/18 08:45 Dose:  50 mg


Nicotine (Nicotine Inhaler*)  10 mg INH Q2H PRN


   PRN Reason: CRAVING


Nicotine (Nicotine Patch 21 Mg/24 Hr*)  1 patch TRANSDERM DAILY@0800 [facility]


   Last Admin: 03/17/18 08:45 Dose:  1 patch


Omeprazole (Prilosec Cap*)  20 mg PO DAILY@0600 [facility]


   Last Admin: 03/17/18 05:55 Dose:  20 mg


Ondansetron HCl (Zofran Inj*)  4 mg IV Q4H PRN


   PRN Reason: NAUSEA/VOMITING


Pharmacy Profile Note (Nicotine Patch Removal Note*)  1 note FOLLOW UP 2100 [facility]


   Last Admin: 03/16/18 20:03 Dose:  Not Given


Thiamine HCl (Vitamin B-1 Tab*)  100 mg PO TID MIR


   Last Admin: 03/17/18 08:45 Dose:  100 mg








 Vital Signs - 8 hr











  03/17/18 03/17/18 03/17/18





  03:07 03:45 03:46


 


Temperature 98.5 F  


 


Pulse Rate 73  


 


Respiratory 17 16 16





Rate   


 


Blood Pressure 162/78  





(mmHg)   


 


O2 Sat by Pulse 97  





Oximetry   














  03/17/18 03/17/18





  07:32 09:04


 


Temperature 97.0 F 


 


Pulse Rate 73 


 


Respiratory 16 18





Rate  


 


Blood Pressure 149/76 





(mmHg)  


 


O2 Sat by Pulse 95 95





Oximetry  











Oxygen Devices in Use Now: None


Appearance: 63 yo M in nAD, AAOx3, poor recall


Eyes: No Scleral Icterus, PERRLA


Ears/Nose/Mouth/Throat: NL Teeth, Lips, Gums, Mucous Membranes Moist


Neck: NL Appearance and Movements; NL JVP, Trachea Midline


Respiratory: Symmetrical Chest Expansion and Respiratory Effort, Clear to 

Auscultation


Cardiovascular: NL Sounds; No Murmurs; No JVD, RRR


Abdominal: NL Sounds; No Tenderness; No Distention, No Hepatosplenomegaly


Lymphatic: No Cervical Adenopathy


Extremities: No Edema, No Clubbing, Cyanosis


Skin: - - abrasions on b/l knees, sutured lac on forehead


Neurological: Alert and Oriented x 3, - - Motor appers to be at 5/5, positive 

Babinski b/l and positive Saleh's sign 


Result Diagrams: 


 03/16/18 05:00





 03/16/18 05:00


Additional Lab and Data: 


 








Assess/Plan/Problems-Billing


Assessment: 





62 year old with history of HTN, heavy EtOH abuse, last drink March 8th, status 

post fall with neck and facial injury. Noted to have generalized weakness and c 

spine injury (no vertebral fractures). Intubated 3/12/18 for inability to 

protect his airway due to ETOH withdrawal and c. spine injury, extubated after 

approx a week. Transferred back to [facility] floor on 3/16/18 











- Patient Problems


(1) Alcohol withdrawal


Comment: resolved


   





(2) Facial fracture


Comment: Nondisplaced fracture of the nasal bridge and nondisplaced fracture of 

the posterior lateral wall of the left maxilary sinus


Follow up with ENT outpatient    





(3) HTN (hypertension)


Comment: 


's, will increase lopressor from 50 to 75 mg BID


   





(4) Cervical spinal cord compression


Comment: After a fall when intoxicated, no verterbal injury noted. Dr. Kingsley d

/c'd pt's c spine collar today


cont PT/OT, PMRU consult


will d/c Mary and check post void residuals   





(5) Diarrhea


Comment: on Lactulose daily, will d/c and see if output is lower in order to d/

c rectal tube. Pt is not encephalopathic now, will repeat ammonia level in AM. 

  





(6) DVT prophylaxis


Comment: HSQ   


Status and Disposition: 





Inpatient.

## 2018-03-18 LAB
HCT VFR BLD AUTO: 40 % (ref 42–52)
HGB BLD-MCNC: 13.8 G/DL (ref 14–18)
MCH RBC QN AUTO: 34 PG (ref 27–31)
MCHC RBC AUTO-ENTMCNC: 35 G/DL (ref 31–36)
MCV RBC AUTO: 98 FL (ref 80–94)
PLATELET # BLD AUTO: 514 10^3/UL (ref 150–450)
RBC # BLD AUTO: 4.06 10^6/UL (ref 4–5.4)
WBC # BLD AUTO: 12.1 10^3/UL (ref 3.5–10.8)

## 2018-03-18 RX ADMIN — FOLIC ACID SCH MG: 1 TABLET ORAL at 09:35

## 2018-03-18 RX ADMIN — METOPROLOL TARTRATE SCH MG: 50 TABLET, FILM COATED ORAL at 09:36

## 2018-03-18 RX ADMIN — NICOTINE SCH PATCH: 21 PATCH TRANSDERMAL at 06:01

## 2018-03-18 RX ADMIN — LORAZEPAM PRN MG: 2 INJECTION INTRAMUSCULAR; INTRAVENOUS at 21:48

## 2018-03-18 RX ADMIN — Medication SCH MG: at 13:20

## 2018-03-18 RX ADMIN — Medication SCH MG: at 21:55

## 2018-03-18 RX ADMIN — HEPARIN SODIUM SCH UNITS: 5000 INJECTION INTRAVENOUS; SUBCUTANEOUS at 13:22

## 2018-03-18 RX ADMIN — OMEPRAZOLE SCH MG: 20 CAPSULE, DELAYED RELEASE ORAL at 05:59

## 2018-03-18 RX ADMIN — HEPARIN SODIUM SCH UNITS: 5000 INJECTION INTRAVENOUS; SUBCUTANEOUS at 21:47

## 2018-03-18 RX ADMIN — HEPARIN SODIUM SCH UNITS: 5000 INJECTION INTRAVENOUS; SUBCUTANEOUS at 06:00

## 2018-03-18 RX ADMIN — MAGNESIUM OXIDE TAB 400 MG (241.3 MG ELEMENTAL MG) SCH MG: 400 (241.3 MG) TAB at 13:22

## 2018-03-18 RX ADMIN — WATER SCH NOTE: 100 INJECTION, SOLUTION INTRAVENOUS at 21:47

## 2018-03-18 RX ADMIN — METOPROLOL TARTRATE SCH MG: 50 TABLET, FILM COATED ORAL at 21:45

## 2018-03-18 RX ADMIN — Medication SCH MG: at 09:35

## 2018-03-18 NOTE — PN
Subjective


Date of Service: 03/18/18


Interval History: 





Pt feels well. Attempted to get OOB in the middle of night to go to bathroom 

and was lowered to the ground by the staff.


Stitches removed from forehead laceration today-healed well.


Family History: Unchanged from Admission


Social History: Unchanged from Admission


Past Medical History: Unchanged from Admission





Objective


Active Medications: 








Acetaminophen (Tylenol Tab*)  650 mg PO Q6H PRN


   PRN Reason: FEVER/PAIN


   Last Admin: 03/17/18 10:32 Dose:  650 mg


Device (Nicotine Mouth Piece*)  1 each INH .USE WITH NICOTROL PRN


   PRN Reason: CRAVING


Fentanyl Citrate (Fentanyl*)  25 mcg IV SLOW PU Q4H PRN


   PRN Reason: PAIN - MODERATE


   Last Admin: 03/17/18 14:52 Dose:  25 mcg


Folic Acid (Folvite Tab*)  1 mg PO DAILY [facility]


   Last Admin: 03/18/18 09:35 Dose:  1 mg


Heparin Sodium (Porcine) (Heparin Vial(*))  5,000 units SUBCUT Q8HR [facility]


   Last Admin: 03/18/18 06:00 Dose:  5,000 units


Lorazepam (Ativan Inj*)  0.5 mg IV PUSH Q4H PRN


   PRN Reason: ANXIETY


   Last Admin: 03/16/18 21:51 Dose:  0.5 mg


Metoprolol Tartrate (Lopressor Tab*)  75 mg PO BID [facility]


   Last Admin: 03/18/18 09:36 Dose:  75 mg


Nicotine (Nicotine Inhaler*)  10 mg INH Q2H PRN


   PRN Reason: CRAVING


Nicotine (Nicotine Patch 21 Mg/24 Hr*)  1 patch TRANSDERM DAILY@0800 [facility]


   Last Admin: 03/18/18 06:01 Dose:  1 patch


Omeprazole (Prilosec Cap*)  20 mg PO DAILY@0600 [facility]


   Last Admin: 03/18/18 05:59 Dose:  20 mg


Ondansetron HCl (Zofran Inj*)  4 mg IV Q4H PRN


   PRN Reason: NAUSEA/VOMITING


Pharmacy Profile Note (Nicotine Patch Removal Note*)  1 note FOLLOW UP 2100 [facility]


   Last Admin: 03/17/18 21:00 Dose:  1 note


Thiamine HCl (Vitamin B-1 Tab*)  100 mg PO TID [facility]


   Last Admin: 03/18/18 09:35 Dose:  100 mg








 Vital Signs - 8 hr











  03/18/18 03/18/18





  03:46 07:13


 


Temperature  98.8 F


 


Pulse Rate 73 83


 


Respiratory 16 18





Rate  


 


Blood Pressure 144/73 144/70





(mmHg)  


 


O2 Sat by Pulse 97 98





Oximetry  











Oxygen Devices in Use Now: None


Appearance: 63 yo M in nAD, AAOx3,


Eyes: No Scleral Icterus, PERRLA


Ears/Nose/Mouth/Throat: NL Teeth, Lips, Gums, Mucous Membranes Moist


Neck: NL Appearance and Movements; NL JVP, Trachea Midline


Respiratory: Symmetrical Chest Expansion and Respiratory Effort, Clear to 

Auscultation


Cardiovascular: NL Sounds; No Murmurs; No JVD, RRR


Abdominal: NL Sounds; No Tenderness; No Distention


Lymphatic: No Cervical Adenopathy


Extremities: No Edema, No Clubbing, Cyanosis


Skin: No Rash or Ulcers, No Nodules or Sclerosis


Neurological: Alert and Oriented x 3, - - b/l hand weakness-resolving, still 

problems with fine motor skills, Babinski equivocal b/l


Result Diagrams: 


 03/18/18 04:59





 03/18/18 04:59


Additional Lab and Data: 


 








Assess/Plan/Problems-Billing


Assessment: 





62 year old with history of HTN, heavy EtOH abuse, last drink March 8th, status 

post fall with neck and facial injury. Noted to have generalized weakness and c 

spine injury (no vertebral fractures). Intubated 3/12/18 for inability to 

protect his airway due to ETOH withdrawal and c. spine injury, extubated after 

approx a week. Transferred back to [facility] floor on 3/16/18 











- Patient Problems


(1) Alcohol withdrawal


Comment: resolved


   





(2) Facial fracture


Comment: Nondisplaced fracture of the nasal bridge and nondisplaced fracture of 

the posterior lateral wall of the left maxilary sinus


Follow up with ENT outpatient    





(3) HTN (hypertension)


Comment: 


's, lopressor increased from 50 to 75 mg BID on 3/17/18


   





(4) Cervical spinal cord compression


Comment: After a fall when intoxicated, no verterbal injury noted. Dr. Kingsley d

/c'd pt's c spine collar onn 3/17/18


cont PT/OT, PMRU consult


Mary d/c'd on 3/17/18 -urinating well   





(5) Diarrhea


Comment: Lactulose discontinued due to profuse diarrhea. Rectal tube removed on 

3/17/18


Pt is not encephalopathic   





(6) DVT prophylaxis


Comment: HSQ   


Status and Disposition: 





Inpatient.

## 2018-03-19 RX ADMIN — METOPROLOL TARTRATE SCH MG: 50 TABLET, FILM COATED ORAL at 20:23

## 2018-03-19 RX ADMIN — HEPARIN SODIUM SCH UNITS: 5000 INJECTION INTRAVENOUS; SUBCUTANEOUS at 05:29

## 2018-03-19 RX ADMIN — MAGNESIUM OXIDE TAB 400 MG (241.3 MG ELEMENTAL MG) SCH MG: 400 (241.3 MG) TAB at 08:24

## 2018-03-19 RX ADMIN — Medication SCH MG: at 20:23

## 2018-03-19 RX ADMIN — Medication SCH MG: at 17:34

## 2018-03-19 RX ADMIN — METOPROLOL TARTRATE SCH MG: 50 TABLET, FILM COATED ORAL at 08:25

## 2018-03-19 RX ADMIN — HEPARIN SODIUM SCH UNITS: 5000 INJECTION INTRAVENOUS; SUBCUTANEOUS at 17:34

## 2018-03-19 RX ADMIN — HEPARIN SODIUM SCH UNITS: 5000 INJECTION INTRAVENOUS; SUBCUTANEOUS at 21:45

## 2018-03-19 RX ADMIN — FOLIC ACID SCH MG: 1 TABLET ORAL at 08:24

## 2018-03-19 RX ADMIN — NICOTINE SCH PATCH: 21 PATCH TRANSDERMAL at 13:06

## 2018-03-19 RX ADMIN — Medication SCH MG: at 08:23

## 2018-03-19 RX ADMIN — WATER SCH NOTE: 100 INJECTION, SOLUTION INTRAVENOUS at 20:26

## 2018-03-19 RX ADMIN — OMEPRAZOLE SCH MG: 20 CAPSULE, DELAYED RELEASE ORAL at 05:29

## 2018-03-19 NOTE — PN
Subjective


Date of Service: 03/19/18


Interval History: 





Patient seen and examined at bedside. Denies any acute complaints and is 

hopeful to go home. Patient is agreeable to rehab if needed but appears to have 

poor insight into his progress. Reports that he would be okay with going home 

and plans to move into a friend's property after he helps her sell her other 

property but has no plan in the interim. 








Family History: Unchanged from Admission


Social History: Unchanged from Admission


Past Medical History: Unchanged from Admission





Objective


Active Medications: 








Acetaminophen (Tylenol Tab*)  650 mg PO Q6H PRN


   PRN Reason: FEVER/PAIN


   Last Admin: 03/17/18 10:32 Dose:  650 mg


Device (Nicotine Mouth Piece*)  1 each INH .USE WITH NICOTROL PRN


   PRN Reason: CRAVING


Fentanyl Citrate (Fentanyl*)  25 mcg IV SLOW PU Q4H PRN


   PRN Reason: PAIN - MODERATE


   Last Admin: 03/17/18 14:52 Dose:  25 mcg


Folic Acid (Folvite Tab*)  1 mg PO DAILY [facility]


   Last Admin: 03/19/18 08:24 Dose:  1 mg


Heparin Sodium (Porcine) (Heparin Vial(*))  5,000 units SUBCUT Q8HR [facility]


   Last Admin: 03/19/18 05:29 Dose:  5,000 units


Lorazepam (Ativan Inj*)  0.5 mg IV PUSH Q4H PRN


   PRN Reason: ANXIETY


   Last Admin: 03/18/18 21:48 Dose:  0.5 mg


Magnesium Oxide (Magox 400 Tab*)  800 mg PO DAILY [facility]


   Last Admin: 03/19/18 08:24 Dose:  800 mg


Metoprolol Tartrate (Lopressor Tab*)  75 mg PO BID [facility]


   Last Admin: 03/19/18 08:25 Dose:  75 mg


Nicotine (Nicotine Inhaler*)  10 mg INH Q2H PRN


   PRN Reason: CRAVING


Nicotine (Nicotine Patch 21 Mg/24 Hr*)  1 patch TRANSDERM DAILY@0800 [facility]


   Last Admin: 03/18/18 06:01 Dose:  1 patch


Omeprazole (Prilosec Cap*)  20 mg PO DAILY@0600 [facility]


   Last Admin: 03/19/18 05:29 Dose:  20 mg


Ondansetron HCl (Zofran Inj*)  4 mg IV Q4H PRN


   PRN Reason: NAUSEA/VOMITING


Pharmacy Profile Note (Nicotine Patch Removal Note*)  1 note FOLLOW UP 2100 [facility]


   Last Admin: 03/18/18 21:47 Dose:  1 note


Thiamine HCl (Vitamin B-1 Tab*)  100 mg PO TID [facility]


   Last Admin: 03/19/18 08:23 Dose:  100 mg








 Vital Signs - 8 hr











  03/19/18 03/19/18





  03:17 07:09


 


Temperature 99.0 F 


 


Pulse Rate 85 73


 


Respiratory 16 18





Rate  


 


Blood Pressure 138/78 126/68





(mmHg)  


 


O2 Sat by Pulse 98 97





Oximetry  











Oxygen Devices in Use Now: None


Appearance: Older male, OOB to chair, pleasant, NAD


Eyes: No Scleral Icterus, PERRLA


Ears/Nose/Mouth/Throat: Clear Oropharnyx, Mucous Membranes Moist


Neck: NL Appearance and Movements; NL JVP


Respiratory: Symmetrical Chest Expansion and Respiratory Effort, Clear to 

Auscultation


Cardiovascular: NL Sounds; No Murmurs; No JVD, RRR, No Edema


Abdominal: NL Sounds; No Tenderness; No Distention


Extremities: No Clubbing, Cyanosis


Skin: No Rash or Ulcers, - - healing wound to forehead


Neurological: Alert and Oriented x 3, - - b/l hand weakness


Lines/Tubes/Other Access: Clean, Dry and Intact Peripheral IV


Nutrition: Taking PO's


Result Diagrams: 


 03/18/18 04:59





 03/19/18 05:48


Additional Lab and Data: 


 








Assess/Plan/Problems-Billing


Assessment: 





62 year old with history of HTN, heavy EtOH abuse, last drink March 8th, status 

post fall with neck and facial injury. Noted to have generalized weakness and c 

spine injury (no vertebral fractures). Intubated 3/12/18 for inability to 

protect his airway due to ETOH withdrawal and c. spine injury, extubated after 

approx a week. Transferred back to [facility] floor on 3/16/18 











- Patient Problems


(1) Alcohol withdrawal


Code(s): F10.239 - ALCOHOL DEPENDENCE WITH WITHDRAWAL, UNSPECIFIED   Comment: 


Resolved   





(2) Facial fracture


Code(s): S02.92XA - UNSP FRACTURE OF FACIAL BONES, INIT FOR CLOS FX   Comment: 


Nondisplaced fracture of the nasal bridge and nondisplaced fracture of the 

posterior lateral wall of the left maxilary sinus.


Follow up with ENT outpatient    





(3) HTN (hypertension)


Code(s): I10 - ESSENTIAL (PRIMARY) HYPERTENSION   Comment: 


's, lopressor increased from 50 to 75 mg BID on 3/17/18   





(4) Cervical spinal cord compression


Code(s): G95.20 - UNSPECIFIED CORD COMPRESSION   Comment: 


After a fall when intoxicated, no verterbal injury noted. 


Dr. Kingsley d/c'd pt's c spine collar on 3/17/18


Cont PT/OT, PMRU consult


Mary d/c'd on 3/17/18 - urinating well   





(5) Diarrhea


Code(s): R19.7 - DIARRHEA, UNSPECIFIED   Comment: 


Lactulose discontinued due to profuse diarrhea. Rectal tube removed on 3/17/18


Pt is not encephalopathic   





(6) DVT prophylaxis


Comment: 


HSQ   


Status and Disposition: 





Inpatient.

## 2018-03-20 VITALS — DIASTOLIC BLOOD PRESSURE: 67 MMHG | SYSTOLIC BLOOD PRESSURE: 125 MMHG

## 2018-03-20 LAB
BASOPHILS # BLD AUTO: 0.1 10^3/UL (ref 0–0.2)
EOSINOPHIL # BLD AUTO: 0.1 10^3/UL (ref 0–0.6)
HCT VFR BLD AUTO: 37 % (ref 42–52)
HGB BLD-MCNC: 13 G/DL (ref 14–18)
LYMPHOCYTES # BLD AUTO: 1.9 10^3/UL (ref 1–4.8)
MCH RBC QN AUTO: 34 PG (ref 27–31)
MCHC RBC AUTO-ENTMCNC: 35 G/DL (ref 31–36)
MCV RBC AUTO: 98 FL (ref 80–94)
MONOCYTES # BLD AUTO: 0.9 10^3/UL (ref 0–0.8)
NEUTROPHILS # BLD AUTO: 9.5 10^3/UL (ref 1.5–7.7)
NRBC # BLD AUTO: 0 10^3/UL
NRBC BLD QL AUTO: 0
PLATELET # BLD AUTO: 574 10^3/UL (ref 150–450)
RBC # BLD AUTO: 3.81 10^6/UL (ref 4–5.4)
WBC # BLD AUTO: 12.6 10^3/UL (ref 3.5–10.8)

## 2018-03-20 RX ADMIN — MENTHOL AND BENZOCAINE PRN LOZ: 10; 6 LOZENGE ORAL at 12:28

## 2018-03-20 RX ADMIN — NICOTINE SCH PATCH: 21 PATCH TRANSDERMAL at 09:07

## 2018-03-20 RX ADMIN — MAGNESIUM OXIDE TAB 400 MG (241.3 MG ELEMENTAL MG) SCH MG: 400 (241.3 MG) TAB at 09:05

## 2018-03-20 RX ADMIN — OMEPRAZOLE SCH MG: 20 CAPSULE, DELAYED RELEASE ORAL at 05:22

## 2018-03-20 RX ADMIN — HEPARIN SODIUM SCH UNITS: 5000 INJECTION INTRAVENOUS; SUBCUTANEOUS at 05:22

## 2018-03-20 RX ADMIN — METOPROLOL TARTRATE SCH MG: 50 TABLET, FILM COATED ORAL at 09:06

## 2018-03-20 RX ADMIN — MENTHOL AND BENZOCAINE PRN LOZ: 10; 6 LOZENGE ORAL at 13:36

## 2018-03-20 RX ADMIN — Medication SCH MG: at 13:35

## 2018-03-20 RX ADMIN — HEPARIN SODIUM SCH UNITS: 5000 INJECTION INTRAVENOUS; SUBCUTANEOUS at 13:35

## 2018-03-20 RX ADMIN — Medication SCH MG: at 09:05

## 2018-03-20 RX ADMIN — FOLIC ACID SCH MG: 1 TABLET ORAL at 09:05

## 2018-03-20 NOTE — PN
Subjective


Date of Service: 03/20/18


Family History: Unchanged from Admission


Social History: Unchanged from Admission


Past Medical History: Unchanged from Admission





Objective


Active Medications: 








Acetaminophen (Tylenol Tab*)  650 mg PO Q6H PRN


   PRN Reason: FEVER/PAIN


   Last Admin: 03/17/18 10:32 Dose:  650 mg


Device (Nicotine Mouth Piece*)  1 each INH .USE WITH NICOTROL PRN


   PRN Reason: CRAVING


Fentanyl Citrate (Fentanyl*)  25 mcg IV SLOW PU Q4H PRN


   PRN Reason: PAIN - MODERATE


   Last Admin: 03/17/18 14:52 Dose:  25 mcg


Folic Acid (Folvite Tab*)  1 mg PO DAILY [facility]


   Last Admin: 03/20/18 09:05 Dose:  1 mg


Heparin Sodium (Porcine) (Heparin Vial(*))  5,000 units SUBCUT Q8HR [facility]


   Last Admin: 03/20/18 05:22 Dose:  5,000 units


Lorazepam (Ativan Inj*)  0.5 mg IV PUSH Q4H PRN


   PRN Reason: ANXIETY


   Last Admin: 03/18/18 21:48 Dose:  0.5 mg


Magnesium Oxide (Magox 400 Tab*)  800 mg PO DAILY [facility]


   Last Admin: 03/20/18 09:05 Dose:  800 mg


Metoprolol Tartrate (Lopressor Tab*)  75 mg PO BID [facility]


   Last Admin: 03/20/18 09:06 Dose:  75 mg


Nicotine (Nicotine Inhaler*)  10 mg INH Q2H PRN


   PRN Reason: CRAVING


Nicotine (Nicotine Patch 21 Mg/24 Hr*)  1 patch TRANSDERM DAILY@0800 [facility]


   Last Admin: 03/20/18 09:07 Dose:  1 patch


Omeprazole (Prilosec Cap*)  20 mg PO DAILY@0600 [facility]


   Last Admin: 03/20/18 05:22 Dose:  20 mg


Ondansetron HCl (Zofran Inj*)  4 mg IV Q4H PRN


   PRN Reason: NAUSEA/VOMITING


Pharmacy Profile Note (Nicotine Patch Removal Note*)  1 note FOLLOW UP 2100 [facility]


   Last Admin: 03/19/18 20:26 Dose:  1 note


Thiamine HCl (Vitamin B-1 Tab*)  100 mg PO TID [facility]


   Last Admin: 03/20/18 09:05 Dose:  100 mg


Throat Lozenges (Chloraseptic Samy*)  1 samy PO Q4H PRN


   PRN Reason: SORE THROAT








 Vital Signs - 8 hr











  03/20/18 03/20/18 03/20/18





  02:58 07:39 08:00


 


Temperature 98.5 F 98.2 F 


 


Pulse Rate 75 80 


 


Respiratory 20 18 18





Rate   


 


Blood Pressure 144/69 125/67 





(mmHg)   


 


O2 Sat by Pulse 100 100 100





Oximetry   











Oxygen Devices in Use Now: None


Result Diagrams: 


 03/20/18 05:15





 03/19/18 05:48


Additional Lab and Data: 


 








Assess/Plan/Problems-Billing


Assessment: 





62 year old with history of HTN, heavy EtOH abuse, last drink March 8th, status 

post fall with neck and facial injury. Noted to have generalized weakness and c 

spine injury (no vertebral fractures). Intubated 3/12/18 for inability to 

protect his airway due to ETOH withdrawal and c. spine injury, extubated after 

approx a week. Transferred back to [facility] floor on 3/16/18 











- Patient Problems


(1) Alcohol withdrawal


Code(s): F10.239 - ALCOHOL DEPENDENCE WITH WITHDRAWAL, UNSPECIFIED   Comment: 


Resolved   





(2) Facial fracture


Code(s): S02.92XA - UNSP FRACTURE OF FACIAL BONES, INIT FOR CLOS FX   Comment: 


Nondisplaced fracture of the nasal bridge and nondisplaced fracture of the 

posterior lateral wall of the left maxilary sinus.


Follow up with ENT outpatient    





(3) HTN (hypertension)


Code(s): I10 - ESSENTIAL (PRIMARY) HYPERTENSION   Comment: 


's, lopressor increased from 50 to 75 mg BID on 3/17/18   





(4) Cervical spinal cord compression


Code(s): G95.20 - UNSPECIFIED CORD COMPRESSION   Comment: 


After a fall when intoxicated, no verterbal injury noted. 


Dr. Kingsley d/c'd pt's c spine collar on 3/17/18


Cont PT/OT, PMRU consult


Tyra d/c'd on 3/17/18 - urinating well   





(5) Diarrhea


Code(s): R19.7 - DIARRHEA, UNSPECIFIED   Comment: 


Lactulose discontinued due to profuse diarrhea. Rectal tube removed on 3/17/18


Pt is not encephalopathic   





(6) DVT prophylaxis


Comment: 


HSQ   


Status and Disposition: 





Inpatient.
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
## 2020-03-14 ENCOUNTER — HOSPITAL ENCOUNTER (INPATIENT)
Dept: HOSPITAL 25 - ED | Age: 65
LOS: 7 days | Discharge: HOME HEALTH | DRG: 347 | End: 2020-03-21
Attending: HOSPITALIST | Admitting: INTERNAL MEDICINE
Payer: COMMERCIAL

## 2020-03-14 DIAGNOSIS — Z91.81: ICD-10-CM

## 2020-03-14 DIAGNOSIS — F10.21: ICD-10-CM

## 2020-03-14 DIAGNOSIS — M21.372: ICD-10-CM

## 2020-03-14 DIAGNOSIS — Z79.899: ICD-10-CM

## 2020-03-14 DIAGNOSIS — E51.2: ICD-10-CM

## 2020-03-14 DIAGNOSIS — I10: ICD-10-CM

## 2020-03-14 DIAGNOSIS — K21.9: ICD-10-CM

## 2020-03-14 DIAGNOSIS — F32.9: ICD-10-CM

## 2020-03-14 DIAGNOSIS — M47.12: ICD-10-CM

## 2020-03-14 DIAGNOSIS — D64.9: ICD-10-CM

## 2020-03-14 DIAGNOSIS — E78.00: ICD-10-CM

## 2020-03-14 DIAGNOSIS — E03.9: ICD-10-CM

## 2020-03-14 DIAGNOSIS — R29.6: ICD-10-CM

## 2020-03-14 DIAGNOSIS — E51.9: ICD-10-CM

## 2020-03-14 DIAGNOSIS — F41.9: ICD-10-CM

## 2020-03-14 DIAGNOSIS — E83.41: ICD-10-CM

## 2020-03-14 DIAGNOSIS — E78.5: ICD-10-CM

## 2020-03-14 DIAGNOSIS — M48.02: Primary | ICD-10-CM

## 2020-03-14 DIAGNOSIS — Z88.8: ICD-10-CM

## 2020-03-14 DIAGNOSIS — Z87.891: ICD-10-CM

## 2020-03-14 LAB
ALBUMIN SERPL BCG-MCNC: 3.7 G/DL (ref 3.2–5.2)
ALBUMIN/GLOB SERPL: 1.2 {RATIO} (ref 1–3)
ALP SERPL-CCNC: 105 U/L (ref 34–104)
ALT SERPL W P-5'-P-CCNC: 35 U/L (ref 7–52)
ANION GAP SERPL CALC-SCNC: 13 MMOL/L (ref 2–11)
ANION GAP SERPL CALC-SCNC: 19 MMOL/L (ref 2–11)
APAP SERPL-MCNC: < 15 MCG/ML
AST SERPL-CCNC: 59 U/L (ref 13–39)
BASOPHILS # BLD AUTO: 0 10^3/UL (ref 0–0.2)
BUN SERPL-MCNC: 12 MG/DL (ref 6–24)
BUN SERPL-MCNC: 12 MG/DL (ref 6–24)
BUN/CREAT SERPL: 15.8 (ref 8–20)
BUN/CREAT SERPL: 17.6 (ref 8–20)
CALCIUM SERPL-MCNC: 7.9 MG/DL (ref 8.6–10.3)
CALCIUM SERPL-MCNC: 8.7 MG/DL (ref 8.6–10.3)
CHLORIDE SERPL-SCNC: 104 MMOL/L (ref 101–111)
CHLORIDE SERPL-SCNC: 99 MMOL/L (ref 101–111)
EOSINOPHIL # BLD AUTO: 0 10^3/UL (ref 0–0.6)
GLOBULIN SER CALC-MCNC: 3.1 G/DL (ref 2–4)
GLUCOSE SERPL-MCNC: 127 MG/DL (ref 70–100)
GLUCOSE SERPL-MCNC: 83 MG/DL (ref 70–100)
HCO3 SERPL-SCNC: 20 MMOL/L (ref 22–32)
HCO3 SERPL-SCNC: 20 MMOL/L (ref 22–32)
HCT VFR BLD AUTO: 40 % (ref 42–52)
HGB BLD-MCNC: 13.5 G/DL (ref 14–18)
LYMPHOCYTES # BLD AUTO: 0.5 10^3/UL (ref 1–4.8)
MCH RBC QN AUTO: 35 PG (ref 27–31)
MCHC RBC AUTO-ENTMCNC: 34 G/DL (ref 31–36)
MCV RBC AUTO: 102 FL (ref 80–94)
MONOCYTES # BLD AUTO: 0.5 10^3/UL (ref 0–0.8)
NEUTROPHILS # BLD AUTO: 10.4 10^3/UL (ref 1.5–7.7)
NRBC # BLD AUTO: 0 10^3/UL
NRBC BLD QL AUTO: 0
PLATELET # BLD AUTO: 257 10^3/UL (ref 150–450)
POTASSIUM SERPL-SCNC: 3.9 MMOL/L (ref 3.5–5)
POTASSIUM SERPL-SCNC: 4.1 MMOL/L (ref 3.5–5)
PROT SERPL-MCNC: 6.8 G/DL (ref 6.4–8.9)
RBC # BLD AUTO: 3.89 10^6 /UL (ref 4.18–5.48)
SALICYLATES SERPL-MCNC: < 2.5 MG/DL (ref ?–30)
SODIUM SERPL-SCNC: 137 MMOL/L (ref 135–145)
SODIUM SERPL-SCNC: 138 MMOL/L (ref 135–145)
TSH SERPL-ACNC: 0.54 MCIU/ML (ref 0.34–5.6)
WBC # BLD AUTO: 11.4 10^3/UL (ref 3.5–10.8)

## 2020-03-14 PROCEDURE — 93306 TTE W/DOPPLER COMPLETE: CPT

## 2020-03-14 PROCEDURE — 70553 MRI BRAIN STEM W/O & W/DYE: CPT

## 2020-03-14 PROCEDURE — 80320 DRUG SCREEN QUANTALCOHOLS: CPT

## 2020-03-14 PROCEDURE — 83735 ASSAY OF MAGNESIUM: CPT

## 2020-03-14 PROCEDURE — 80048 BASIC METABOLIC PNL TOTAL CA: CPT

## 2020-03-14 PROCEDURE — 84100 ASSAY OF PHOSPHORUS: CPT

## 2020-03-14 PROCEDURE — 72146 MRI CHEST SPINE W/O DYE: CPT

## 2020-03-14 PROCEDURE — 96361 HYDRATE IV INFUSION ADD-ON: CPT

## 2020-03-14 PROCEDURE — 80329 ANALGESICS NON-OPIOID 1 OR 2: CPT

## 2020-03-14 PROCEDURE — G0480 DRUG TEST DEF 1-7 CLASSES: HCPCS

## 2020-03-14 PROCEDURE — 84443 ASSAY THYROID STIM HORMONE: CPT

## 2020-03-14 PROCEDURE — 99283 EMERGENCY DEPT VISIT LOW MDM: CPT

## 2020-03-14 PROCEDURE — A9579 GAD-BASE MR CONTRAST NOS,1ML: HCPCS

## 2020-03-14 PROCEDURE — 96374 THER/PROPH/DIAG INJ IV PUSH: CPT

## 2020-03-14 PROCEDURE — 82607 VITAMIN B-12: CPT

## 2020-03-14 PROCEDURE — 72148 MRI LUMBAR SPINE W/O DYE: CPT

## 2020-03-14 PROCEDURE — 72156 MRI NECK SPINE W/O & W/DYE: CPT

## 2020-03-14 PROCEDURE — 80053 COMPREHEN METABOLIC PANEL: CPT

## 2020-03-14 PROCEDURE — 85025 COMPLETE CBC W/AUTO DIFF WBC: CPT

## 2020-03-14 PROCEDURE — 83930 ASSAY OF BLOOD OSMOLALITY: CPT

## 2020-03-14 PROCEDURE — 82140 ASSAY OF AMMONIA: CPT

## 2020-03-14 PROCEDURE — 70450 CT HEAD/BRAIN W/O DYE: CPT

## 2020-03-14 PROCEDURE — 36415 COLL VENOUS BLD VENIPUNCTURE: CPT

## 2020-03-14 PROCEDURE — 84425 ASSAY OF VITAMIN B-1: CPT

## 2020-03-14 RX ADMIN — ATORVASTATIN CALCIUM SCH MG: 10 TABLET, FILM COATED ORAL at 22:02

## 2020-03-14 RX ADMIN — THIAMINE HYDROCHLORIDE SCH MLS/HR: 100 INJECTION, SOLUTION INTRAMUSCULAR; INTRAVENOUS at 22:02

## 2020-03-14 RX ADMIN — METOPROLOL SUCCINATE SCH MG: 50 TABLET, EXTENDED RELEASE ORAL at 22:02

## 2020-03-14 NOTE — ED
Adult Trauma





- HPI Summary


HPI Summary: 


This patient is a 64 year old male presenting to [facility] with a chief complaint 

of bilateral lower extremity weakness. He states 2 years ago he fell down 

stairs and later states he developed neuropathy. He states his feet are 

experiencing numbness/tingling. He states he has fallen several times in the 

past 2-3 weeks. He reports loss of appetite. He denies blurred vision. Patient 

reports tremors, which is new. He states he hit the back of his head when he 

fell this morning. He states he is not on blood thinners. His brother in the 

room states he has had problems with alcohol before, to the point where he had 

to be admitted to the hospital. Medications reviewed, allergies noted. 


 





Ascorbic Acid TAB* [Vitamin C  TAB*] 500 mg PO QAM 01/13/13 [History Confirmed 

03/08/18]


Simvastatin TAB(NF) [Zocor 20 MG (NF)] 20 mg PO BEDTIME 01/06/16 [History 

Confirmed 03/08/18]


Valsartan TAB* [Diovan TAB*] 160 mg PO QAM 01/06/16 [History Confirmed 03/08/18]


buPROPion SR TAB* [Wellbutrin SR TAB*] 100 mg PO QAM 01/06/16 [History 

Confirmed 03/08/18]


Metoprolol Succinate XL TAB* [Toprol XL TAB*] 50 mg PO BEDTIME 03/08/18 [

History Confirmed 03/08/18]


Multivitamins/Minerals TAB* [Theragran/minerals TAB*] 1 tab PO DAILY 03/08/18 [

History Confirmed 03/08/18]


Omega-3 Fatty Acids [Omega-3] 1,000 mg PO DAILY 03/08/18 [History Confirmed 03/ 08/18]


Omeprazole CAP (NF) [Prilosec CAP* 20 MG] 20 mg PO DAILY 03/08/18 [History 

Confirmed 03/08/18]


Saw Lykens Fruit [Saw Palmetto] 450 mg PO DAILY 03/08/18 [History Confirmed 03 /08/18]


Acetaminophen TAB* [Tylenol TAB*] 650 mg PO Q6H PRN  tab 03/20/18 [Rx]


Benzocaine/Menthol EDNA* [Chloraseptic EDNA*] 1 edna PO Q4H PRN  lozenge 03/20/18 [

Rx]


Folic Acid TAB* [Folvite TAB*] 1 mg PO DAILY  tab 03/20/18 [Rx]


Magnesium Oxide TAB* [MagOx 400 TAB*] 800 mg PO DAILY  tab 03/20/18 [Rx]


Nicotine Inhaler* (NF) [Nicotine Inhaler*] 10 mg INH Q2H PRN  amp 03/20/18 [Rx]


Nicotine PATCH 21 MG/24 HR* 1 patch TRANSDERM DAILY@0800  patch 03/20/18 [Rx]


Nicotine Patch Removal NOTE* 1 note FOLLOW UP 2100  misc 03/20/18 [Rx]


Thiamine TAB* [Vitamin B-1  MG*] 100 mg PO TID  tab 03/20/18 [Rx]








- History of Current Complaint


Stated Complaint: FALLS PER EMS


Time Seen by Provider: 03/14/20 12:36


Hx Obtained From: Patient


Onset/Duration: Started Hours Ago, Started Days Ago, Started Weeks Ago





- Additional Pertinent History


Primary Care Physician: [id number]





- Allergy/Home Medications


Allergies/Adverse Reactions: 


 Allergies











Allergy/AdvReac Type Severity Reaction Status Date / Time


 


hydrochlorothiazide Allergy Severe See Comment Verified 05/14/18 10:08


 


lisinopril Allergy Intermediate Coughing Verified 05/14/18 10:08


 


citalopram AdvReac  See Comment Verified 05/14/18 10:08











Home Medications: 


 Home Medications





Ascorbic Acid TAB* [Vitamin C  TAB*] 500 mg PO QAM 01/13/13 [History Confirmed 

03/08/18]


Simvastatin TAB(NF) [Zocor 20 MG (NF)] 20 mg PO BEDTIME 01/06/16 [History 

Confirmed 03/08/18]


Valsartan TAB* [Diovan TAB*] 160 mg PO QAM 01/06/16 [History Confirmed 03/08/18]


buPROPion SR TAB* [Wellbutrin SR TAB*] 100 mg PO QAM 01/06/16 [History 

Confirmed 03/08/18]


Metoprolol Succinate XL TAB* [Toprol XL TAB*] 50 mg PO BEDTIME 03/08/18 [

History Confirmed 03/08/18]


Multivitamins/Minerals TAB* [Theragran/minerals TAB*] 1 tab PO DAILY 03/08/18 [

History Confirmed 03/08/18]


Omega-3 Fatty Acids [Omega-3] 1,000 mg PO DAILY 03/08/18 [History Confirmed 03/ 08/18]


Omeprazole CAP (NF) [Prilosec CAP* 20 MG] 20 mg PO DAILY 03/08/18 [History 

Confirmed 03/08/18]


Saw Lykens Fruit [Saw Palmetto] 450 mg PO DAILY 03/08/18 [History Confirmed 03 /08/18]


Acetaminophen TAB* [Tylenol TAB*] 650 mg PO Q6H PRN  tab 03/20/18 [Rx]


Benzocaine/Menthol EDNA* [Chloraseptic EDNA*] 1 edna PO Q4H PRN  lozenge 03/20/18 [

Rx]


Folic Acid TAB* [Folvite TAB*] 1 mg PO DAILY  tab 03/20/18 [Rx]


Magnesium Oxide TAB* [MagOx 400 TAB*] 800 mg PO DAILY  tab 03/20/18 [Rx]


Nicotine Inhaler* (NF) [Nicotine Inhaler*] 10 mg INH Q2H PRN  amp 03/20/18 [Rx]


Nicotine PATCH 21 MG/24 HR* 1 patch TRANSDERM DAILY@0800  patch 03/20/18 [Rx]


Nicotine Patch Removal NOTE* 1 note FOLLOW UP 2100  misc 03/20/18 [Rx]


Thiamine TAB* [Vitamin B-1  MG*] 100 mg PO TID  tab 03/20/18 [Rx]











PMH/Surg Hx/FS Hx/Imm Hx


Endocrine/Hematology History: 


   Denies: Hx Diabetes


Cardiovascular History: Reports: Hx Hypertension, Other Cardiovascular Problems/

Disorders - high cholesterol


   Denies: Hx Pacemaker/ICD


GI History: Reports: Hx Gastroesophageal Reflux Disease - ON MEDICATION FOR


 History: Reports: Other  Problems/Disorders - prostate


   Denies: Hx Renal Disease


Sensory History: Reports: Hx Cataracts - left eye, Hx Contacts or Glasses


   Denies: Hx Hearing Aid


Opthamlomology History: Reports: Hx Cataracts - left eye, Hx Contacts or Glasses


Psychiatric History: Reports: Hx Anxiety - ON MEDICATION FOR


   Denies: Hx Panic Disorder





- Cancer History


Hx Chemotherapy: No





- Surgical History


Surgery Procedure, Year, and Place: CATARACTS BILATERAL


Hx Anesthesia Reactions: No





- Family History


Known Family History: 


   Negative: Cardiac Disease





- Social History


Alcohol Use: Daily


Alcohol Amount: states he clara drinks beer daily and endorses having 6 mixed 

drink shots


Hx Substance Use: Yes


Substance Use Type: Reports: Marijuana


Type: Cigars


Amount Used/How Often: 3/4 PPD X 25 YEARS


Have You Smoked in the Last Year: No





Review of Systems


Positive: Other - Loss of appetitie 


Negative: Blurred Vision


Positive: Weakness, Numbness


All Other Systems Reviewed And Are Negative: Yes





Physical Exam





- Summary


Physical Exam Summary: 





Constitutional: Well-developed, Well-nourished, Alert. (-) Distressed


Skin: Warm, Dry


HENT: Normocephalic; Atraumatic


Eyes: Conjunctiva normal


Neck: Musculoskeletal ROM normal neck. (-) JVD, (-) Stridor, (-) Tracheal 

deviation


Cardio: Rhythm regular, rate normal, Heart sounds normal; Intact distal pulses; 

The pedal pulses are 2+ and symmetric. Radial pulses are 2+ and symmetric. (-) 

Murmur


Pulmonary/Chest wall: Effort normal. (-) Respiratory distress, (-) Wheezes, (-) 

Rales


Abd: Soft, (-) tenderness, (-) Distension, (-) Guarding, (-) Rebound


Musculoskeletal: (-) Edema


Lymph: (-) Cervical adenopathy


Neuro: Alert, Oriented x3. Strength normal. Cranial nerves II-XII are grossly 

intact. (-) Dysmetria, (-) Nystagmus, (-) Ataxia by finger to nose testing, (-) 

Sensory deficit.


Psych: Mood and affect Normal





Triage Information Reviewed: Yes


Vital Signs On Initial Exam: 





 











Temp Pulse Resp BP Pulse Ox


 


 98.8 F   98   18   164/96   97 


 


 03/14/20 12:39  03/14/20 12:39  03/14/20 13:10  03/14/20 12:39  03/14/20 12:39











Vital Signs Reviewed: Yes





Procedures





- Sedation


Patient Received Moderate/Deep Sedation with Procedure: No





Diagnostics





- Laboratory


Result Diagrams: 


 03/14/20 13:15





 03/14/20 16:06


Lab Statement: Any lab studies that have been ordered have been reviewed, and 

results considered in the medical decision making process.





- CT


  ** Brain


CT Interpretation Completed By: Radiologist


Summary of CT Findings: Normal CT of the brain. ED Provider has reviewed this 

report.





Adult Trauma Course/Dx





- Course


Course Of Treatment: Patient is here with frequent falls over the past couple 

of weeks and central weakness in his legs.  Patient has an obvious new tremor 

per him.  Patient does have a history of alcohol addiction and withdrawal per 

chart review.  Patient last drank last night.  Patient was given 1 dose of 

Ativan with no improvement in his tremor.  He had a wam score of 5.  Patient 

had boater performed which showed an anion gap acidosis likely due to alcoholic 

ketoacidosis.  Patient is given 2 L IV fluids with improvement in his lab 

values.  Patient had negative CT brain given his fall and head trauma today.  

Patient to degranulation but failed.  Due to patient's failed ambulation and 

new onset neuro symptoms, patient is admitted to the hospital for neurology 

consult in the morning.





- Diagnoses


Provider Diagnoses: 


 Unable to ambulate, Tremor, unspecified, Alcohol addiction








Discharge ED





- Sign-Out/Discharge


Documenting (check all that apply): Patient Departure - Admission, accepted by 

Dr. Helms, Hospitalist





- Discharge Plan


Condition: Stable


Disposition: ADMITTED TO [facility]


Referrals: 


Althea Rodriguez MD [Primary Care Provider] - 





- Billing Disposition and Condition


Condition: STABLE


Disposition: Admitted to [facility]





- Attestation Statements


Document Initiated by Sreekanth: Yes


Documenting Hugoibe: Bishop Starkey


Provider For Whom Sreekanth is Documenting (Include Credential): Adrian Sahni MD


Scribe Attestation: 


Bishop MARX, scribed for Adrian Sahni MD on 03/14/20 at 1803. 


Scribe Documentation Reviewed: Yes


Provider Attestation: 


The documentation as recorded by the Bishop cervantes accurately 

reflects the service I personally performed and the decisions made by me, Adrian Sahni MD


Status of Scribe Document: Viewed

## 2020-03-14 NOTE — HP
CC:  Dr. Rodriguez *

 

[facility] MEDICINE HISTORY AND PHYSICAL:

 

DATE OF ADMISSION:  20

 

PRIMARY CARE PHYSICIAN:  Dr. Rodriguez.

 

ATTENDING PHYSICIAN:  Dr. Lennox Gann * (dictation provided by Gabby Worthington NP).

 

CHIEF COMPLAINT:  Gait instability and falls.

 

HISTORY OF PRESENT ILLNESS:  Mr. Fisher is a 64-year-old male with a past 
medical history of Wernicke's encephalopathy, alcoholism, cervical spine 
compression, hypertension, and hyperlipidemia, who presents to the hospital 
today with concern for multiple falls.  Mr. Fisher was last admitted to our 
hospital in 2018, at which time he was having symptoms of Wernicke's 
encephalopathy that ultimately improved by the time of discharge.  He states 
that he avoided alcohol for about a year and a half, but about 6 months ago 
started drinking again.  He states he has only been drinking lightly about 2 
beers per day.  He states he did not drink today.  He states that over the past 
few days he has been noticing that he is much weaker and has been holding onto 
the walls in his home to stand up.  Today, he fell down 3 times and ultimately 
decided to come to the emergency room. He states that other than this the only 
change has been that he has lost his appetite over the past week and therefore 
has not been taking any of his home medications.  He states he has also felt 
constipated, although he did have a bowel movement today.  He denies any fevers
, chills, cough, chest pain, shortness of breath, nausea, vomiting, diarrhea, 
or abdominal pain.

 

In the emergency room, Mr. Fisher was unable to ambulate due to weakness and 
gait instability.  He had labs, which showed normal creatinine, normal 
electrolytes, no significant leukocytosis.  He had a CT brain, that was normal.

 

PAST MEDICAL HISTORY:

1.  Wernicke's encephalopathy.

2.  History of cervical spine compression not requiring surgical intervention.

3.  Alcohol abuse.

4.  Hypertension.

5.  Hyperlipidemia. MEDICATIONS:

1.  Ascorbic acid 500 mg p.o. q.a.m.

2.  Tylenol 650 mg p.o. q.6 hours p.r.n.

3.  Multivitamin with minerals 1 tab p.o. daily.

4.  Metoprolol succinate 50 mg p.o. at bedtime.

5.  Magnesium oxide 800 mg p.o. daily.

6.  Saw palmetto 450 mg p.o. daily.

7.  Omeprazole 20 mg p.o. daily.

8.  Omega-3 fatty acids 1000 mg p.o. daily.

9.  Simvastatin 20 mg p.o. at bedtime.

10.  Bupropion  mg p.o. q.a.m.

11.  Valsartan 160 mg p.o. q.a.m.

 

ALLERGIES:  To HYDROCHLOROTHIAZIDE, LISINOPRIL, and CITALOPRAM.

 

SOCIAL HISTORY:  The patient states that he has returned to drinking minimally. 
He has been a former smoker for quite some time.  He states that he lives alone.

 

REVIEW OF SYSTEMS:  A 14-point review of systems was completed with Mr. Fisher
, and all those not mentioned above were negative.

 

                               PHYSICAL EXAMINATION

 

GENERAL:  Mr. Fisher is lying in the bed.  He is in no acute distress.

 

VITAL SIGNS:  Temperature 98.8, pulse rate 87, respiratory rate 18, O2 
saturation 97% on room air, and blood pressure 142/81.

 

LUNGS:  Clear to auscultation bilaterally with no accessory muscle use and good 
aeration.

 

HEART:  S1, S2.  No murmur, rub, or gallop and regular.

 

ABDOMEN:  Soft and nontender with bowel sounds positive x4.

 

EXTREMITIES:  No cyanosis or edema.

 

NEURO:  He is alert.  He is oriented x3.  He has a tremor, but he has +4 
strength in all extremities.  There is no facial asymmetry or weakness.

 

SKIN:  Intact.

 

 DIAGNOSTIC STUDIES/LAB DATA:  WBC 11.4, hemoglobin 13.5, hematocrit 40, 
platelet count 257.  Sodium 137, potassium 4.1, chloride 104, serum bicarbonate 
20, BUN 12, creatinine 0.68, glucose 83.  Serum osmolality is 294.  On arrival, 
his anion gap was 19, but on repeat within 2 hours it was 13.  TSH 0.54.  
Alcohol level less than 10, acetaminophen less than 15, salicylates less than 
2.5.

 

CT brain is as read per above.

 

ASSESSMENT AND PLAN:  Mr. Fisher is a 64-year-old male with past medical 
history of Wernicke's encephalopathy, alcoholism, hypertension, hyperlipidemia, 
who presents today to the hospital with concern for falls and difficulty 
ambulating with gait ataxia.  Our plans are for inpatient admission as I expect 
his length of stay to be greater than 2 days for the followin.  Gait ataxia and falling.  I suspect this is related to his history of 
chronic alcoholism, Wernicke's encephalopathy, and return to drinking.  Plan to 
monitor with [facility] protocol.  He will have Ativan available p.r.n.  We are also 
going to start thiamine 500 mg IV q.8 hours for 2 days.  B1 level is pending.  
I also have ammonia level pending.  The patient will have physical and 
occupational therapy ordered as well.  He does have a history of cervical cord 
compression that has been evaluated and was not deemed to require surgical 
intervention.  Further evaluation will be undertaken based on clinical course.

2.  Depression.  Continue bupropion.

3.  Hypertension.  Continue metoprolol and valsartan.

4.  Hyperlipidemia.  Continue simvastatin.

5.  Gastroesophageal reflux disease.  Continue omeprazole.

6.  DVT prophylaxis with SCDs.

7.  Disposition:  To medical floor.

 

TIME SPENT:  Approximately 60 minutes was spent on the admission of this patient
, more than half the time was spent with the patient at the bedside reviewing 
the events leading up to this hospitalization, performing the physical 
examination, and reviewing my plan of care.

 

 ____________________________________ 

GABBY WORTHINGTON NP

 

[id number]/CPS #: [id number]

HIRAL

## 2020-03-15 LAB
ANION GAP SERPL CALC-SCNC: 10 MMOL/L (ref 2–11)
BUN SERPL-MCNC: 11 MG/DL (ref 6–24)
BUN/CREAT SERPL: 16.7 (ref 8–20)
CALCIUM SERPL-MCNC: 7.6 MG/DL (ref 8.6–10.3)
CHLORIDE SERPL-SCNC: 104 MMOL/L (ref 101–111)
GLUCOSE SERPL-MCNC: 63 MG/DL (ref 70–100)
HCO3 SERPL-SCNC: 21 MMOL/L (ref 22–32)
MAGNESIUM SERPL-MCNC: 1.5 MG/DL (ref 1.9–2.7)
POTASSIUM SERPL-SCNC: 3.6 MMOL/L (ref 3.5–5)
SODIUM SERPL-SCNC: 135 MMOL/L (ref 135–145)

## 2020-03-15 RX ADMIN — THIAMINE HYDROCHLORIDE SCH MLS/HR: 100 INJECTION, SOLUTION INTRAMUSCULAR; INTRAVENOUS at 13:26

## 2020-03-15 RX ADMIN — THIAMINE HYDROCHLORIDE SCH MLS/HR: 100 INJECTION, SOLUTION INTRAMUSCULAR; INTRAVENOUS at 21:25

## 2020-03-15 RX ADMIN — THERA TABS SCH TAB: TAB at 08:54

## 2020-03-15 RX ADMIN — PANTOPRAZOLE SODIUM SCH MG: 40 TABLET, DELAYED RELEASE ORAL at 08:54

## 2020-03-15 RX ADMIN — MAGNESIUM OXIDE TAB 400 MG (241.3 MG ELEMENTAL MG) SCH MG: 400 (241.3 MG) TAB at 21:25

## 2020-03-15 RX ADMIN — THIAMINE HYDROCHLORIDE SCH MLS/HR: 100 INJECTION, SOLUTION INTRAMUSCULAR; INTRAVENOUS at 04:58

## 2020-03-15 RX ADMIN — OXYCODONE HYDROCHLORIDE AND ACETAMINOPHEN SCH MG: 500 TABLET ORAL at 08:55

## 2020-03-15 RX ADMIN — ATORVASTATIN CALCIUM SCH MG: 10 TABLET, FILM COATED ORAL at 21:24

## 2020-03-15 RX ADMIN — BUPROPION HYDROCHLORIDE SCH MG: 100 TABLET, FILM COATED, EXTENDED RELEASE ORAL at 08:55

## 2020-03-15 RX ADMIN — VALSARTAN SCH MG: 160 TABLET ORAL at 08:54

## 2020-03-15 RX ADMIN — FOLIC ACID SCH MG: 1 TABLET ORAL at 08:54

## 2020-03-15 RX ADMIN — METOPROLOL SUCCINATE SCH MG: 50 TABLET, EXTENDED RELEASE ORAL at 21:24

## 2020-03-15 NOTE — PN
Subjective


Date of Service: 03/15/20


Interval History: 


Patient seen today on the floor.  Admitted yesterday for recurrent fall at home

, fell 3 times yesterday.  He has known history of alcohol abuse and dependency 

as well as known history of cervical cord compression diagnosed 2018 and he was 

seen by neurosurgery at that time and he was deemed to be non surgical 

candidate.  He was admitted and placed on [facility] protocol PT/OT ordered.  Patient 

is quite emotional today complaining of increase weakness.  no fever, or 

chills.  Increase tremors and difficulty with gait


Past Medical History: Unchanged from Admission





Objective


Active Medications: 








Acetaminophen (Tylenol Tab*)  650 mg PO Q6H PRN


   PRN Reason: FEVER/PAIN


Ascorbic Acid (Vitamin C  Tab*)  500 mg PO QAM [facility]


   Last Admin: 03/15/20 08:55 Dose:  500 mg


Atorvastatin Calcium (Lipitor*)  10 mg PO BEDTIME [facility]


   Last Admin: 03/14/20 22:02 Dose:  10 mg


Bupropion HCl ([facility]rin Sr Tab*)  [facility]


   Last Admin: 03/15/20 08:55 Dose:  100 mg


Folic Acid (Folvite Tab*)  1 mg PO DAILY [facility]


   Last Admin: 03/15/20 08:54 Dose:  1 mg


Thiamine HCl 500 mg/ Sodium (Chloride)  255 mls @ 255 mls/hr IV Q8H [facility]


   Stop: 03/16/20 20:59


   Last Admin: 03/15/20 13:26 Dose:  255 mls/hr


Lorazepam (Ativan Tab(*))  0 - 6 mg PO .PER [facility] PROTOCOL [facility]; Protocol


Magnesium Oxide (Magox 400 Tab*)  800 mg PO BID [facility]


Metoprolol Succinate (Toprol Xl Tab*)  50 mg PO BEDTIME [facility]


   Last Admin: 03/14/20 22:02 Dose:  50 mg


Miscellaneous (Ativan Pyxis [place])  1 ea N/A .ATIVAN IV KEY PRN


   PRN Reason: PYXIS KEY


Multivitamins/Minerals (Theragran/Minerals Tab*)  1 tab PO DAILY [facility]


   Last Admin: 03/15/20 08:54 Dose:  1 tab


Ondansetron HCl (Zofran Inj*)  4 mg IV Q6H PRN


   PRN Reason: NAUSEA


Pantoprazole Sodium (Protonix Tab*)  40 mg PO DAILY [facility]


   Last Admin: 03/15/20 08:54 Dose:  40 mg


Potassium Chloride (Klor Con Er Tab*)  20 meq PO DAILY [facility]


   Last Admin: 03/15/20 09:43 Dose:  20 meq


Valsartan (Diovan Tab*)  160 mg PO QAM [facility]


   Last Admin: 03/15/20 08:54 Dose:  160 mg








 Vital Signs - 8 hr











  03/15/20 03/15/20 03/15/20





  07:21 09:05 11:00


 


Temperature 98.1 F 97.4 F 


 


Pulse Rate 85 85 


 


Respiratory 18 18 16





Rate   


 


Blood Pressure 128/69 124/59 





(mmHg)   


 


O2 Sat by Pulse 99 99 





Oximetry   














  03/15/20 03/15/20





  11:04 13:17


 


Temperature 97.6 F 97.6 F


 


Pulse Rate 77 86


 


Respiratory 16 18





Rate  


 


Blood Pressure 126/72 129/69





(mmHg)  


 


O2 Sat by Pulse 97 99





Oximetry  











Oxygen Devices in Use Now: None


Appearance: awake, alert no fever or chills


Eyes: No Scleral Icterus, - - EOMI


Ears/Nose/Mouth/Throat: NL Teeth, Lips, Gums, Mucous Membranes Moist


Neck: NL Appearance and Movements; NL JVP, Trachea Midline


Respiratory: Symmetrical Chest Expansion and Respiratory Effort, Clear to 

Auscultation


Cardiovascular: NL Sounds; No Murmurs; No JVD, No Edema


Abdominal: NL Sounds; No Tenderness; No Distention


Extremities: No Edema


Neurological: - - 5/5 bilateral.  


Result Diagrams: 


 03/14/20 13:15





 03/15/20 05:21





Assess/Plan/Problems-Billing


Assessment: 





65 y/o male admitted for increase ataxia and fall most likely secondary to 

Cervical myelopathy





- Patient Problems


(1) Ataxia


Current Visit: Yes   Status: Acute   Code(s): R27.0 - ATAXIA, UNSPECIFIED   

SNOMED Code(s): 49643650


   Comment: - I suspect it is related to his cervical myelopathy seen on 2018 

MRI


- Will repeat Cervical MRI


- consult neurosurgery in am


- PT/OT   





(2) Cervical spinal cord compression


Current Visit: No   Status: Acute   Code(s): G95.20 - UNSPECIFIED CORD 

COMPRESSION   SNOMED Code(s): 11919449


   Comment: - I suspect it is related to his cervical myelopathy seen on 2018 

MRI


- Will repeat Cervical MRI


- consult neurosurgery in am


- PT/OT   





(3) HTN (hypertension)


Current Visit: No   Status: Acute   Code(s): I10 - ESSENTIAL (PRIMARY) 

HYPERTENSION   SNOMED Code(s): 95447600


   Comment: - Metprolol 50 mg daily

## 2020-03-16 RX ADMIN — FOLIC ACID SCH MG: 1 TABLET ORAL at 09:31

## 2020-03-16 RX ADMIN — HEPARIN SODIUM SCH UNITS: 5000 INJECTION INTRAVENOUS; SUBCUTANEOUS at 20:17

## 2020-03-16 RX ADMIN — POTASSIUM CHLORIDE SCH MEQ: 1500 TABLET, EXTENDED RELEASE ORAL at 09:32

## 2020-03-16 RX ADMIN — THIAMINE HYDROCHLORIDE SCH MLS/HR: 100 INJECTION, SOLUTION INTRAMUSCULAR; INTRAVENOUS at 04:53

## 2020-03-16 RX ADMIN — THERA TABS SCH TAB: TAB at 09:30

## 2020-03-16 RX ADMIN — THIAMINE HYDROCHLORIDE SCH MLS/HR: 100 INJECTION, SOLUTION INTRAMUSCULAR; INTRAVENOUS at 13:45

## 2020-03-16 RX ADMIN — ATORVASTATIN CALCIUM SCH MG: 10 TABLET, FILM COATED ORAL at 20:15

## 2020-03-16 RX ADMIN — PANTOPRAZOLE SODIUM SCH MG: 40 TABLET, DELAYED RELEASE ORAL at 09:29

## 2020-03-16 RX ADMIN — MAGNESIUM OXIDE TAB 400 MG (241.3 MG ELEMENTAL MG) SCH MG: 400 (241.3 MG) TAB at 20:17

## 2020-03-16 RX ADMIN — VALSARTAN SCH MG: 160 TABLET ORAL at 09:30

## 2020-03-16 RX ADMIN — METOPROLOL SUCCINATE SCH MG: 50 TABLET, EXTENDED RELEASE ORAL at 20:17

## 2020-03-16 RX ADMIN — MAGNESIUM OXIDE TAB 400 MG (241.3 MG ELEMENTAL MG) SCH MG: 400 (241.3 MG) TAB at 09:31

## 2020-03-16 RX ADMIN — OXYCODONE HYDROCHLORIDE AND ACETAMINOPHEN SCH MG: 500 TABLET ORAL at 09:30

## 2020-03-16 RX ADMIN — BUPROPION HYDROCHLORIDE SCH MG: 100 TABLET, FILM COATED, EXTENDED RELEASE ORAL at 09:30

## 2020-03-16 RX ADMIN — POTASSIUM CHLORIDE SCH MEQ: 1500 TABLET, EXTENDED RELEASE ORAL at 20:15

## 2020-03-16 NOTE — PN
Subjective


Date of Service: 03/16/20


Interval History: 


Patient seen today, he was out of bed and participated with PT.  MRI ordered 

and result did reveals severe central canal stenosis at C4-C5; C5-C6.  Spoke to 

neurosurgery unavailable today.  will consult them for am.  Meanwhile will 

place him on cervical soft neck collar and fall precautions.


Past Medical History: Unchanged from Admission





Objective


Active Medications: 








Acetaminophen (Tylenol Tab*)  650 mg PO Q6H PRN


   PRN Reason: FEVER/PAIN


Ascorbic Acid (Vitamin C  Tab*)  500 mg PO QAM [facility]


   Last Admin: 03/16/20 09:30 Dose:  500 mg


Atorvastatin Calcium (Lipitor*)  10 mg PO BEDTIME [facility]


   Last Admin: 03/15/20 21:24 Dose:  10 mg


Bupropion HCl (Wellbutrin Sr Tab*)  100 mg PO QAM [facility]


   Last Admin: 03/16/20 09:30 Dose:  100 mg


Diazepam (Valium Tab(*))  5 mg PO ONCE PRN


   PRN Reason: ANXIETY


   Last Admin: 03/16/20 13:52 Dose:  5 mg


Folic Acid (Folvite Tab*)  1 mg PO DAILY [facility]


   Last Admin: 03/16/20 09:31 Dose:  1 mg


Thiamine HCl 500 mg/ Sodium (Chloride)  255 mls @ 255 mls/hr IV Q8H [facility]


   Stop: 03/16/20 20:59


   Last Admin: 03/16/20 13:45 Dose:  255 mls/hr


Lorazepam (Ativan Tab(*))  0 - 6 mg PO .PER [facility] PROTOCOL [facility]; Protocol


Magnesium Oxide (Magox 400 Tab*)  800 mg PO BID [facility]


   Last Admin: 03/16/20 09:31 Dose:  800 mg


Metoprolol Succinate (Toprol Xl Tab*)  50 mg PO BEDTIME [facility]


   Last Admin: 03/15/20 21:24 Dose:  50 mg


Miscellaneous (Ativan Pyxis [place])  1 ea N/A .ATIVAN IV KEY PRN


   PRN Reason: PYXIS KEY


Multivitamins/Minerals (Theragran/Minerals Tab*)  1 tab PO DAILY [facility]


   Last Admin: 03/16/20 09:30 Dose:  1 tab


Ondansetron HCl (Zofran Inj*)  4 mg IV Q6H PRN


   PRN Reason: NAUSEA


Pantoprazole Sodium (Protonix Tab*)  40 mg PO DAILY [facility]


   Last Admin: 03/16/20 09:29 Dose:  40 mg


Potassium Chloride (Klor Con Er Tab*)  20 meq PO BID [facility]


   Last Admin: 03/16/20 09:32 Dose:  20 meq


Valsartan (Diovan Tab*)  160 mg PO QAM [facility]


   Last Admin: 03/16/20 09:30 Dose:  160 mg








 Vital Signs - 8 hr











  03/16/20 03/16/20 03/16/20





  08:00 09:01 13:52


 


Temperature  97.5 F 


 


Pulse Rate  80 


 


Respiratory 18 18 16





Rate   


 


Blood Pressure  145/77 





(mmHg)   


 


O2 Sat by Pulse  99 





Oximetry   











Oxygen Devices in Use Now: None


Appearance: Awake, alert no distress


Eyes: No Scleral Icterus, - - EOMI


Ears/Nose/Mouth/Throat: NL Teeth, Lips, Gums


Neck: NL Appearance and Movements; NL JVP, Trachea Midline


Respiratory: Symmetrical Chest Expansion and Respiratory Effort, Clear to 

Auscultation


Cardiovascular: NL Sounds; No Murmurs; No JVD, No Edema


Abdominal: NL Sounds; No Tenderness; No Distention


Neurological: Alert and Oriented x 3, - - Both upper and lower extremties 

ataxia.  


Result Diagrams: 


 03/14/20 13:15





 03/15/20 05:21





Assess/Plan/Problems-Billing


Assessment: 





65 y/o male admitted for increase ataxia and fall most likely secondary to 

Cervical myelopathy





- Patient Problems


(1) Ataxia


Current Visit: Yes   Status: Acute   Code(s): R27.0 - ATAXIA, UNSPECIFIED   

SNOMED Code(s): 86616779


   Comment: - I suspect it is related to his cervical myelopathy seen on 2018 

MRI


- repeat Cervical MRI reveals severe canal stenosis at C4-C5 and C5-6


- I will consult neurosurgery in am (not available today)


- PT/OT


- Cervical collar soft collar.  Will avoid miami-J collar for now as I am 

concerned will cause acute hyperextension.  will start with the soft cervical 

neck collar   





(2) Cervical spinal cord compression


Current Visit: No   Status: Acute   Code(s): G95.20 - UNSPECIFIED CORD 

COMPRESSION   SNOMED Code(s): 29776385


   Comment: - I suspect it is related to his cervical myelopathy seen on 2018 

MRI


- repeat Cervical MRI reveals severe canal stenosis at C4-C5 and C5-6


- I will consult neurosurgery in am (not available today)


- PT/OT


- Cervical collar soft collar.  Will avoid miami-J collar for now as I am 

concerned will cause acute hyperextension.  will start with the soft cervical 

neck collar   





(3) History of alcohol dependence


Current Visit: Yes   Status: Acute   Code(s): F10.21 - ALCOHOL DEPENDENCE, IN 

REMISSION   SNOMED Code(s): 133663834


   Comment: - Will continue WAM and if remain negative will D/c in am   





(4) HTN (hypertension)


Current Visit: No   Status: Acute   Code(s): I10 - ESSENTIAL (PRIMARY) 

HYPERTENSION   SNOMED Code(s): 24476337


   Comment: - Metprolol 50 mg daily   





(5) DVT prophylaxis


Current Visit: No   Status: Acute   Code(s): XZB5744 -    SNOMED Code(s): 

324649719


   Comment: 


CESAR

## 2020-03-17 RX ADMIN — MAGNESIUM OXIDE TAB 400 MG (241.3 MG ELEMENTAL MG) SCH MG: 400 (241.3 MG) TAB at 19:38

## 2020-03-17 RX ADMIN — VALSARTAN SCH MG: 160 TABLET ORAL at 09:46

## 2020-03-17 RX ADMIN — BUPROPION HYDROCHLORIDE SCH MG: 100 TABLET, FILM COATED, EXTENDED RELEASE ORAL at 09:46

## 2020-03-17 RX ADMIN — METOPROLOL SUCCINATE SCH MG: 50 TABLET, EXTENDED RELEASE ORAL at 19:38

## 2020-03-17 RX ADMIN — HEPARIN SODIUM SCH UNITS: 5000 INJECTION INTRAVENOUS; SUBCUTANEOUS at 21:28

## 2020-03-17 RX ADMIN — POTASSIUM CHLORIDE SCH MEQ: 1500 TABLET, EXTENDED RELEASE ORAL at 19:39

## 2020-03-17 RX ADMIN — FOLIC ACID SCH MG: 1 TABLET ORAL at 09:56

## 2020-03-17 RX ADMIN — POTASSIUM CHLORIDE SCH MEQ: 1500 TABLET, EXTENDED RELEASE ORAL at 09:46

## 2020-03-17 RX ADMIN — OXYCODONE HYDROCHLORIDE AND ACETAMINOPHEN SCH MG: 500 TABLET ORAL at 09:46

## 2020-03-17 RX ADMIN — PANTOPRAZOLE SODIUM SCH MG: 40 TABLET, DELAYED RELEASE ORAL at 09:47

## 2020-03-17 RX ADMIN — ATORVASTATIN CALCIUM SCH MG: 10 TABLET, FILM COATED ORAL at 19:39

## 2020-03-17 RX ADMIN — HEPARIN SODIUM SCH UNITS: 5000 INJECTION INTRAVENOUS; SUBCUTANEOUS at 05:50

## 2020-03-17 RX ADMIN — MAGNESIUM OXIDE TAB 400 MG (241.3 MG ELEMENTAL MG) SCH MG: 400 (241.3 MG) TAB at 09:46

## 2020-03-17 RX ADMIN — HEPARIN SODIUM SCH UNITS: 5000 INJECTION INTRAVENOUS; SUBCUTANEOUS at 13:58

## 2020-03-17 RX ADMIN — FOLIC ACID SCH MG: 1 TABLET ORAL at 09:46

## 2020-03-17 RX ADMIN — THERA TABS SCH TAB: TAB at 09:47

## 2020-03-17 NOTE — PN
Subjective


Date of Service: 03/17/20


Interval History: 





Patient states feels significant improvement with the placement of his soft 

neck collar.  He feels he is not shaking as much and gait improved.  I spoke to 

Dr. Milner and scheduled to see him today.  no urine or stool incontinence


Past Medical History: Unchanged from Admission





Objective


Active Medications: 








Acetaminophen (Tylenol Tab*)  650 mg PO Q6H PRN


   PRN Reason: FEVER/PAIN


Ascorbic Acid (Vitamin C  Tab*)  [facility] mg [facility] QAM [facility]


   Last Admin: 03/17/20 09:46 Dose:  500 mg


Atorvastatin Calcium (Lipitor*)  10 mg PO BEDTIME [facility]


   Last Admin: 03/16/20 20:15 Dose:  10 mg


Bupropion HCl (Wellbutrin Sr Tab*)  100 mg PO QAM [facility]


   Last Admin: 03/17/20 09:46 Dose:  100 mg


Diazepam (Valium Tab(*))  5 mg PO ONCE PRN


   PRN Reason: ANXIETY


   Last Admin: 03/16/20 13:52 Dose:  5 mg


Folic Acid (Folvite Tab*)  1 mg PO DAILY [facility]


   Last Admin: 03/17/20 09:56 Dose:  1 mg


Heparin Sodium (Porcine) (Heparin Vial(*))  5,000 units SUBCUT Q8HR [facility]


   Last Admin: 03/17/20 05:50 Dose:  5,000 units


Lorazepam (Ativan Tab(*))  0 - 6 mg PO .PER [facility] PROTOCOL [facility]; Protocol


Magnesium Oxide (Magox 400 Tab*)  800 mg PO BID [facility]


   Last Admin: 03/17/20 09:46 Dose:  800 mg


Metoprolol Succinate (Toprol Xl Tab*)  50 mg PO BEDTIME [facility]


   Last Admin: 03/16/20 20:17 Dose:  50 mg


Miscellaneous (Ativan Pyxis [place])  1 ea N/A .ATIVAN IV KEY PRN


   PRN Reason: PYXIS KEY


Multivitamins/Minerals (Theragran/Minerals Tab*)  1 tab PO DAILY [facility]


   Last Admin: 03/17/20 09:47 Dose:  1 tab


Ondansetron HCl (Zofran Inj*)  4 mg IV Q6H PRN


   PRN Reason: NAUSEA


Pantoprazole Sodium (Protonix Tab*)  40 mg PO DAILY [facility]


   Last Admin: 03/17/20 09:47 Dose:  40 mg


Potassium Chloride (Klor Con Er Tab*)  20 meq PO BID [facility]


   Last Admin: 03/17/20 09:46 Dose:  20 meq


Valsartan (Diovan Tab*)  160 mg PO QAM [facility]


   Last Admin: 03/17/20 09:46 Dose:  160 mg








 Vital Signs - 8 hr











  03/17/20 03/17/20





  03:08 07:32


 


Temperature 98.0 F 97.4 F


 


Pulse Rate 69 71


 


Respiratory 19 18





Rate  


 


Blood Pressure 137/75 139/85





(mmHg)  


 


O2 Sat by Pulse 100 100





Oximetry  











Oxygen Devices in Use Now: None


Appearance: awake ,alert no distress


Eyes: No Scleral Icterus, - - EOMI


Ears/Nose/Mouth/Throat: NL Teeth, Lips, Gums, Mucous Membranes Moist


Neck: NL Appearance and Movements; NL JVP, Trachea Midline


Respiratory: Symmetrical Chest Expansion and Respiratory Effort, Clear to 

Auscultation


Cardiovascular: NL Sounds; No Murmurs; No JVD, RRR, No Edema


Abdominal: NL Sounds; No Tenderness; No Distention


Extremities: No Edema


Skin: No Rash or Ulcers


Neurological: Alert and Oriented x 3


Result Diagrams: 


 03/14/20 13:15





 03/15/20 05:21





Assess/Plan/Problems-Billing


Assessment: 





63 y/o male admitted for increase ataxia and fall most likely secondary to 

Cervical myelopathy





- Patient Problems


(1) Ataxia


Current Visit: Yes   Status: Acute   Code(s): R27.0 - ATAXIA, UNSPECIFIED   

SNOMED Code(s): 58278639


   Comment: 


- I suspect it is related to his cervical myelopathy seen on 2018 MRI


- repeat Cervical MRI reveals severe canal stenosis at C4-C5 and C5-6


- I did consult neurosurgery, PT/OT


- Cervical collar soft collar.     





(2) Cervical spinal cord compression


Current Visit: No   Status: Acute   Code(s): G95.20 - UNSPECIFIED CORD 

COMPRESSION   SNOMED Code(s): 86820947


   Comment: 





- I suspect it is related to his cervical myelopathy seen on 2018 MRI


- repeat Cervical MRI reveals severe canal stenosis at C4-C5 and C5-6


- I did consult neurosurgery, PT/OT


- Cervical collar soft collar.     





(3) History of alcohol dependence


Current Visit: Yes   Status: Acute   Code(s): F10.21 - ALCOHOL DEPENDENCE, IN 

REMISSION   SNOMED Code(s): 761399846


   Comment: 


- Will discontinue WAM    





(4) HTN (hypertension)


Current Visit: No   Status: Acute   Code(s): I10 - ESSENTIAL (PRIMARY) 

HYPERTENSION   SNOMED Code(s): 10334755


   Comment: 


- Metprolol 50 mg daily   





(5) DVT prophylaxis


Current Visit: No   Status: Acute   Code(s): MFX5062 -    SNOMED Code(s): 

336998773


   Comment: 


CESAR

## 2020-03-17 NOTE — CONS
*** AMENDED REPORT NOW INCLUDES DATE OF CONSULT - ESIGNED BEFORE ADJUSTMENT ***



CONSULTATION NOTE:

 

DATE OF CONSULT:  03/17/20

 

HISTORY OF PRESENT ILLNESS:  The patient is a very pleasant 64-year-old right-
handed gentleman with past medical history of Wernicke's encephalopathy, 
alcoholism, cervical spine compression, hypothyroidism, and hyperlipidemia, who 
was recently admitted to the hospital for multiple falls.  The patient had a 
previous fall from a 1 flight of stairs approximately 2 years ago.  He reported 
that he started having lower extremity neuropathy with loss of sensation below 
his knees, which improved with physical therapy.  Approximately 4 days ago, he 
reports that he had another fall while he was trying to get to the bathroom.  
He did not lose his consciousness.  He denies neck or back pain, but he was 
brought to the emergency room because of subsequent falls.  He reports that he 
had significant weakness in the lower extremities at that time.  He reports 
that now he is back to his baseline.  He denies any neck pain.  He denies any 
back pain.  He reports that he has no new weakness of his upper or lower 
extremities, although he noticed with walking with physical therapy that he has 
a left footdrop, he has been dragging his foot for quite a while as he reports.
  He reports that he has decreased sensation in his fingertips and bilateral 
lower extremities below the mid calf level and this is his baseline.  He 
ambulates with significant difficulty.  He does have difficulty with his 
balance and has frequent falls.  He feels unsteady on his feet. He also has 
difficulty with his dexterity for a long time.  The patient is retired. He used 
to work as a property/casualty .  He is , lives alone, 
and he has 1 daughter who lives in [place].  The patient reports that he 
had episodes of urinary urgency and cannot make it to the bathroom and he had a 
few episodes of loss of bowel control recently.  The patient reports that he 
has had constipation followed by diarrhea, and now, he has little bit of better 
formed stool, but still cannot feel when he has to have a bowel movement.  He 
reports that he has no loss of perianal sensation.

 

PAST MEDICAL HISTORY:  Wernicke's encephalopathy, history of cervical stenosis, 
alcohol abuse, hypertension, hyperlipidemia.

 

MEDICATIONS:  The patient is on:

1.  Ascorbic acid.

2.  Tylenol.

3.  Multivitamin.

4.  Metoprolol.

5.  Magnesium.

6.  Saw palmetto.

7.  Omeprazole.

8.  Omega-3.

9.  Simvastatin.

10.  Bupropion.

11.  Valsartan.

 

ALLERGIES:  HYDROCHLOROTHIAZIDE, LISINOPRIL, CITALOPRAM.

 

SOCIAL HISTORY:  The patient is a former smoker.  Alcohol positive, he drinks 3 
beers per day as he reports.  Recreational drug use, negative.

 

PHYSICAL EXAM:  The patient is not in acute distress.  He is awake, alert, and 
oriented x3.  His pupils are equal and reactive.  Cranial nerves II through XII 
are grossly intact.  Motor 4-5/5 in all extremities with exception of the left 
foot dorsiflexion and EHL which is 0-1/5.  Sensory grossly intact to light 
touch except decreased sensation below his mid calf level in both lower 
extremities.  Deep tendon reflexes +3 bilaterally symmetrically.  He has 
positive Babinski.  Clonus negative.  He has positive Glo's sign 
bilaterally.  The patient has no tenderness to palpation of the thoracic or 
lumbar spine. Rectal exam revealed normal perineal sensation , good tone and 
voluntary contraction of his sphincter.  He has free range of motion of the 
cervical spine.  The patient has a cervical collar and he reports that his 
symptoms improved after the application of the collar.

 

DIAGNOSTIC STUDIES:  The patient had a CT scan of the brain that did not reveal 
any acute change.

 

The patient had an MRI of his cervical spine revealing degenerative disk 
disease with severe stenosis at C4-5 and C5-6 and to a lesser degree at C3-4 
and C6-7 with spinal cord signal changes consistent with myelomalacia.

 

ASSESSMENT:  The patient is a very pleasant 64-year-old right-handed gentleman 
with past medical history of Wernicke's encephalopathy, alcoholism, hypertension
, hyperlipidemia, with difficulty with ambulation, loss of balance, ataxia, and 
MRI findings consistent with cervical stenosis with myelomalacia.

 

PLAN:  The patient at this point is improved with the use of cervical collar as 
he reports.  Based on his imaging and his clinical exam that he has evidence of 
cervical spondylotic myelopathy, I think that surgical intervention in the form 
of possible posterior cervical decompression and fusion at C3 to C6 might be a 
reasonable option for him.  Nevertheless, because of the presence of the left 
footdrop which is painless as well as the severe ataxia and asterixis, further 
imaging with MRI of the brain, thoracic, and lumbar spine may be reasonable as 
well as consideration for neurology consultation.  Discussed with the patient 
regarding nonsurgical and surgical options as well as expectations, limitations
, and possible complications of the procedure with complications including, but 
not limited to bleeding, infection, risk of injury to adjacent structures, coma
, paralysis, death, need for additional procedures, anesthesia risks, stroke, 
blindness, cancer, instability, hardware failure, adjacent level disease, 
pseudoarthrosis, proximal/distal junctional kyphosis, spinal fluid leak, 
hematoma formation, deep venous thrombosis, pulmonary embolism, injury to the 
trachea or esophagus, need for tracheostomy or gastrostomy, need for prolonged 
ICU stay, prolonged rehabilitation, prolonged hospitalization.  The patient 
understood that the goal of the procedure is not to improve his condition but 
to stabilize hopefully his myelopathy and that with surgical intervention he 
may not improve and in fact may get worse.  He also understood that he may need 
to have additional procedures in the future and that the operative plan may be 
modified according to intraoperative findings and conditions.  The patient at 
this point would like to discuss with his daughter before making any further 
decisions.  He attempted to call at our advice his daughter from the bedside, 
but it was not possible to talk to the patient's daughter as she was not 
available.  I also tried to contact his daughter, Lyssa, at [phone] and 
left a message also.

 

Thank you for allowing us to participate in the care of this patient.  Please 
do not hesitate to contact our office in case if you have any further questions 
or concerns regarding the care of this patient.

 

 [id number]/CPS #: [id number]

HIRAL

## 2020-03-18 LAB
ANION GAP SERPL CALC-SCNC: 5 MMOL/L (ref 2–11)
BASOPHILS # BLD AUTO: 0 10^3/UL (ref 0–0.2)
BUN SERPL-MCNC: 12 MG/DL (ref 6–24)
BUN/CREAT SERPL: 17.4 (ref 8–20)
CALCIUM SERPL-MCNC: 8.4 MG/DL (ref 8.6–10.3)
CHLORIDE SERPL-SCNC: 104 MMOL/L (ref 101–111)
EOSINOPHIL # BLD AUTO: 0.1 10^3/UL (ref 0–0.6)
GLUCOSE SERPL-MCNC: 91 MG/DL (ref 70–100)
HCO3 SERPL-SCNC: 26 MMOL/L (ref 22–32)
HCT VFR BLD AUTO: 38 % (ref 42–52)
HGB BLD-MCNC: 12.9 G/DL (ref 14–18)
LYMPHOCYTES # BLD AUTO: 1.4 10^3/UL (ref 1–4.8)
MAGNESIUM SERPL-MCNC: 1.8 MG/DL (ref 1.9–2.7)
MCH RBC QN AUTO: 35 PG (ref 27–31)
MCHC RBC AUTO-ENTMCNC: 34 G/DL (ref 31–36)
MCV RBC AUTO: 105 FL (ref 80–94)
MONOCYTES # BLD AUTO: 0.7 10^3/UL (ref 0–0.8)
NEUTROPHILS # BLD AUTO: 4.5 10^3/UL (ref 1.5–7.7)
NRBC # BLD AUTO: 0 10^3/UL
NRBC BLD QL AUTO: 0
PLATELET # BLD AUTO: 227 10^3/UL (ref 150–450)
POTASSIUM SERPL-SCNC: 4.1 MMOL/L (ref 3.5–5)
RBC # BLD AUTO: 3.65 10^6 /UL (ref 4.18–5.48)
SODIUM SERPL-SCNC: 135 MMOL/L (ref 135–145)
WBC # BLD AUTO: 6.6 10^3/UL (ref 3.5–10.8)

## 2020-03-18 RX ADMIN — PANTOPRAZOLE SODIUM SCH MG: 40 TABLET, DELAYED RELEASE ORAL at 08:48

## 2020-03-18 RX ADMIN — BUPROPION HYDROCHLORIDE SCH MG: 100 TABLET, FILM COATED, EXTENDED RELEASE ORAL at 08:47

## 2020-03-18 RX ADMIN — THERA TABS SCH TAB: TAB at 08:47

## 2020-03-18 RX ADMIN — VALSARTAN SCH MG: 160 TABLET ORAL at 08:48

## 2020-03-18 RX ADMIN — MAGNESIUM OXIDE TAB 400 MG (241.3 MG ELEMENTAL MG) SCH MG: 400 (241.3 MG) TAB at 21:16

## 2020-03-18 RX ADMIN — METOPROLOL SUCCINATE SCH MG: 50 TABLET, EXTENDED RELEASE ORAL at 21:16

## 2020-03-18 RX ADMIN — MAGNESIUM OXIDE TAB 400 MG (241.3 MG ELEMENTAL MG) SCH MG: 400 (241.3 MG) TAB at 08:48

## 2020-03-18 RX ADMIN — HEPARIN SODIUM SCH UNITS: 5000 INJECTION INTRAVENOUS; SUBCUTANEOUS at 21:17

## 2020-03-18 RX ADMIN — HEPARIN SODIUM SCH UNITS: 5000 INJECTION INTRAVENOUS; SUBCUTANEOUS at 06:33

## 2020-03-18 RX ADMIN — OXYCODONE HYDROCHLORIDE AND ACETAMINOPHEN SCH MG: 500 TABLET ORAL at 08:48

## 2020-03-18 RX ADMIN — POTASSIUM CHLORIDE SCH MEQ: 1500 TABLET, EXTENDED RELEASE ORAL at 08:48

## 2020-03-18 RX ADMIN — POTASSIUM CHLORIDE SCH MEQ: 1500 TABLET, EXTENDED RELEASE ORAL at 21:16

## 2020-03-18 RX ADMIN — ATORVASTATIN CALCIUM SCH MG: 10 TABLET, FILM COATED ORAL at 21:15

## 2020-03-18 RX ADMIN — FOLIC ACID SCH MG: 1 TABLET ORAL at 08:48

## 2020-03-18 RX ADMIN — HEPARIN SODIUM SCH UNITS: 5000 INJECTION INTRAVENOUS; SUBCUTANEOUS at 13:34

## 2020-03-18 NOTE — ECHO
*[facility]*

[place] [address]

Phone: [phone]

Fax #: [phone]



-------------------------------------------------------------------

Transthoracic Echocardiogram



Patient: Tanner Fisher                  MRN:        [id number]

:     1955                         Study Date: 2020

Age:     64                                 Accession#: [id number]

Gender:  M                                  HR:         65 bpm

Height:  74 in /188 cm                      BSA:        1.86 m^2

Weight:  147.7 lb /67.1 kg                  BMI:        19 kg/m^2



*Sonographer: Rhonda Newman

 

*Referring Physician: * Lennox GannReading Physician: * Cailin Marks MD



-------------------------------------------------------------------

Indications:  ETOH



-------------------------------------------------------------------

History:   Risk factors:   Former tobacco use. Hypertension.

Dyslipidemia.



-------------------------------------------------------------------

Conclusions



Summary:



- Impressions: No previous study was available for comparison.

- Left ventricle: The cavity size is normal. Wall thickness is

  normal. Calcific chordae near the anterior annulus. Systolic

  function is normal. The estimated ejection fraction is 55-60%.

  Left ventricular diastolic function parameters are normal.

- Right ventricle: Systolic function is normal.

- Mitral valve: There is trace regurgitation.

- Tricuspid valve: There is trace regurgitation.

- No prior echocardiogram to compare.



-------------------------------------------------------------------

Study data:  Transthoracic echocardiogram.  Procedure:

Transthoracic echocardiography was performed. Image quality was

good.  Complete 2D, spectral Doppler, and color flow Doppler.

Location:  Bedside.  Patient status:  Inpatient. Patient room

number: 408-1.  Rhythm:  Normal sinus rhythm.



-------------------------------------------------------------------

Findings



Left ventricle:  The cavity size is normal. Wall thickness is

normal. Calcific chordae near the anterior annulus. Systolic

function is normal. The estimated ejection fraction is 55-60%. Wall

motion is normal; there are no regional wall motion abnormalities.

Left ventricular diastolic function parameters are normal.

Right ventricle:  The cavity size is normal. Systolic function is

normal.

Left atrium:  The atrium is normal in size.

Right atrium:  The atrium is normal in size.

Atrial septum:  No defect or patent foramen ovale is identified.

Mitral valve:   The valve is structurally normal.    There is no

evidence of stenosis.   There is trace regurgitation.

Aortic valve:   The valve is structurally normal. The valve is

trileaflet.   Cusp separation is normal. Transvalvular velocity is

within the normal range. There is no evidence of stenosis. There is

no significant regurgitation.

Tricuspid valve:   The valve is structurally normal.    There is no

evidence of stenosis.   There is trace regurgitation.

Pulmonic valve:    The valve is structurally normal.    There is no

evidence of stenosis.   There is trace regurgitation.

Aorta:  Ascending aorta: The ascending aorta is appears normal.

Aortic arch: The aortic arch is poorly visualized.

The aortic root appears normal.

Pericardium:  There is no significant pericardial effusion.

Pulmonary arteries:

Systolic pressure can not be accurately estimated.



Systemic veins:

Inferior vena cava: The vessel is normal in size. There is (&gt;= 50%)

respiratory change in the IVC dimension.

Pulmonary veins:  The Pulmonary veins appear normal.



-------------------------------------------------------------------

Measurements



 Left ventricle             Value        Ref       Right atrium continued      Value       Ref

 ISABEL, LAX                   4.4   cm     4.2 -     ML dim, ES, A4C             3.1   cm    2.6 -

                                         5.8                                               4.4

 ESD, LAX                   3.1   cm     2.5 -     SI dim, ES, A4C             4.8   cm    3.4 -

                                         4.0                                               5.3

 FS, LAX                    29    %      25 - 43

 PW, ED, LAX                1.0   cm     0.6 -     Aortic valve                Value       Ref

                                         1.0       Peak v, S                   0.88  m/sec --------

 E&apos;, lat anselmo, TDI  (L)      8.5   cm/sec &gt;=10.0    VTI, S                      19.5  cm    --
------

 E/e&apos;, lat anselmo,             9            --------  Mean grad, S                1.7   mm Hg -----
---

 TDI                                               Peak grad, S                3.1   mm Hg --------

                                                   LVOT/AV, VTI                0.94        --------

 LVOT                       Value        Ref       ratio

 Diam, S                    2.02  cm     --------  ALE, VTI                    3.00  cm^2  --------

 Area                       3.2   cm^2   --------  ALE, Vmax                   2.73  cm^2  --------

 Peak andrew, S                0.75  m/sec  --------

 VTI, S                     18.3  cm     --------  Mitral valve                Value       Ref

 Peak grad, S               2     mm Hg  --------  Peak E                      0.72  m/sec --------

 Mean grad, S               1     mm Hg  --------  Peak A                      0.74  m/sec --------

                                                   Decel time                  218   ms    --------

 Ventricular septum         Value        Ref       Peak grad, D                2.1   mm Hg --------

 IVS, ED                    1.0   cm     0.6 -     Peak E/A ratio              0.97        --------

                                         1.0

                                                   Pulmonic valve              Value       Ref

 Right ventricle            Value        Ref       Peak v, S                   0.61  m/sec --------

 ISABEL, LAX                   2.7   cm     --------  Peak grad, S                1.5   mm Hg --------

 

 Left atrium                Value        Ref       Aortic root                 Value       Ref

 LA ID                      4.1   cm     --------  Root diam                   3.4   cm    &lt;4.0

 SI dim ES, LAX             4.1   cm     --------

 ML dim, A4C                3.9   cm     --------  Ascending aorta             Value       Ref

 SI dim, A4C                4.8   cm     --------  AAo AP diam, S              2.8   cm    --------

 Vol, ES, 2-p               63    ml     --------

 Vol/bsa, ES, 2-p           34    ml/m^2 16 - 34   Inferior vena cava          Value       Ref

                                                   Diam                        2.6   cm    --------

 Right atrium               Value        Ref

 SI dim, ES                 4.8   cm     3.4 -

                                         5.3

 

Legend:

(L)  and  (H)  donald values outside specified reference range.



Prepared and electronically signed by



Cailin Marks MD

2020 10:27

## 2020-03-18 NOTE — PN
Subjective


Date of Service: 03/18/20


Interval History: 





Patient tells me his gait feels more steady than yesterday, physical therapist 

and RN agrees. He denies headache, visual changes, weakness/numbness/tingling 

in extremities, chest pain, difficulty breathing, fever/chills.


Past Medical History: Unchanged from Admission





Objective


Active Medications: 








Acetaminophen (Tylenol Tab*)  650 mg PO Q6H PRN


   PRN Reason: FEVER/PAIN


Ascorbic Acid (Vitamin C  Tab*)  500 mg PO QAM [facility]


   Last Admin: 03/18/20 08:48 Dose:  500 mg


Atorvastatin Calcium (Lipitor*)  10 mg PO BEDTIME [facility]


   Last Admin: 03/17/20 19:39 Dose:  10 mg


Bupropion HCl (Wellbutrin Sr Tab*)  100 mg PO QAM [facility]


   Last Admin: 03/18/20 08:47 Dose:  100 mg


Diazepam (Valium Tab(*))  5 mg PO ONCE PRN


   PRN Reason: ANXIETY


   Last Admin: 03/16/20 13:52 Dose:  5 mg


Diazepam (Valium Tab(*))  5 mg PO ONCE PRN


   PRN Reason: ANXIETY


Folic Acid (Folvite Tab*)  1 mg PO DAILY [facility]


   Last Admin: 03/18/20 08:48 Dose:  1 mg


Heparin Sodium (Porcine) (Heparin Vial(*))  5,000 units SUBCUT Q8HR [facility]


   Last Admin: 03/18/20 06:33 Dose:  5,000 units


Magnesium Oxide (Magox 400 Tab*)  800 mg PO BID [facility]


   Last Admin: 03/18/20 08:48 Dose:  800 mg


Melatonin (Melatonin)  3 mg PO BEDTIME PRN


   PRN Reason: SLEEP


   Last Admin: 03/17/20 21:28 Dose:  3 mg


Metoprolol Succinate (Toprol Xl Tab*)  50 mg PO BEDTIME [facility]


   Last Admin: 03/17/20 19:38 Dose:  50 mg


Miscellaneous (Ativan Pyxis [place])  1 ea N/A .ATIVAN IV KEY PRN


   PRN Reason: PYXIS KEY


Multivitamins/Minerals (Theragran/Minerals Tab*)  1 tab PO DAILY [facility]


   Last Admin: 03/18/20 08:47 Dose:  1 tab


Ondansetron HCl (Zofran Inj*)  4 mg IV Q6H PRN


   PRN Reason: NAUSEA


Pantoprazole Sodium (Protonix Tab*)  40 mg PO DAILY [facility]


   Last Admin: 03/18/20 08:48 Dose:  40 mg


Potassium Chloride (Klor Con Er Tab*)  20 meq PO BID [facility]


   Last Admin: 03/18/20 08:48 Dose:  20 meq


Valsartan (Diovan Tab*)  160 mg PO QAM [facility]


   Last Admin: 03/18/20 08:48 Dose:  160 mg








 Vital Signs - 8 hr











  03/18/20 03/18/20





  07:15 13:14


 


Temperature 98.0 F 


 


Pulse Rate 71 


 


Respiratory 20 17





Rate  


 


Blood Pressure 133/86 





(mmHg)  


 


O2 Sat by Pulse 100 





Oximetry  











Oxygen Devices in Use Now: None


Appearance: Thin, white male sitting in chair, appearing comfortable and in NAD


Eyes: No Scleral Icterus, - - PERRL


Ears/Nose/Mouth/Throat: Mucous Membranes Moist


Neck: Trachea Midline


Respiratory: Symmetrical Chest Expansion and Respiratory Effort, Clear to 

Auscultation


Cardiovascular: NL Sounds; No Murmurs; No JVD, RRR


Abdominal: - - abd soft/nontender/nondistended


Extremities: No Edema, No Clubbing, Cyanosis


Skin: No Rash or Ulcers


Neurological: Alert and Oriented x 3, - - strength of knee flexion/extension is 

5/5 bilaterally;  strength 5/5 bilat; flexion/extension at elbow strength 5/

5 bilaterally


Result Diagrams: 


 03/18/20 05:42





 03/18/20 05:42





Assess/Plan/Problems-Billing


Assessment: 





65 y/o male with PMHx alcohol use, prior hx of Wernicke's, HTN, and HLD 

admitted presented to the hospital due to ataxia and fall at home.





- Patient Problems


(1) Ataxia


Current Visit: Yes   Status: Acute   Code(s): R27.0 - ATAXIA, UNSPECIFIED   

SNOMED Code(s): 58608514


   Comment: 


- presented with worsening gait and fall at home, does have prior hx of Wernicke

's


- repeat Cervical MRI reveals severe canal stenosis at C4-C5 and C5-6


- appreciate neurosurgery consult


- I suspect the severe cervical stenosis and compressive myelopathy is 

contributing, however this gait has been steadily improving during this 

hospitalization which leads me to believe that thiamine deficiency was 

contributing as well


- thiamine level still pending


- soft cervical collar in place   





(2) Cervical spinal cord compression


Current Visit: No   Status: Acute   Code(s): G95.20 - UNSPECIFIED CORD 

COMPRESSION   SNOMED Code(s): 10835137


   Comment: 


- noted on cervical spine MRI at site of severe cervical stensosis


- likely contributing to ataxia


- appreciate neurosurgery consult; MRI brain/T-spine/L-spine ordered per 

recommendations and without acute findings; appears patient is candidate for 

surgery and awaiting timeframe   





(3) History of alcohol dependence


Current Visit: Yes   Status: Acute   Code(s): F10.21 - ALCOHOL DEPENDENCE, IN 

REMISSION   SNOMED Code(s): 689698457


   Comment: 


-SW involved   





(4) HTN (hypertension)


Current Visit: No   Status: Acute   Code(s): I10 - ESSENTIAL (PRIMARY) 

HYPERTENSION   SNOMED Code(s): 85550107


   Comment: 


-continue metoprolol and valsartan


-normotensive   





(5) Depression


Current Visit: Yes   Status: Acute   Code(s): F32.9 - MAJOR DEPRESSIVE DISORDER

, SINGLE EPISODE, UNSPECIFIED   SNOMED Code(s): 51706080


   Comment: 


-continue wellbutrin   





(6) GERD (gastroesophageal reflux disease)


Current Visit: Yes   Status: Acute   Code(s): K21.9 - GASTRO-ESOPHAGEAL REFLUX 

DISEASE WITHOUT ESOPHAGITIS   SNOMED Code(s): 012251105


   Comment: 


-continue PPI   





(7) HLD (hyperlipidemia)


Current Visit: Yes   Status: Acute   Code(s): E78.5 - HYPERLIPIDEMIA, 

UNSPECIFIED   SNOMED Code(s): 58676069


   Comment: 


-continue statin   





(8) Hypomagnesemia


Current Visit: Yes   Status: Acute   Code(s): E83.42 - HYPOMAGNESEMIA   SNOMED 

Code(s): 763764578


   Comment: 


-likely related to poor po intake d/t alcohol use


-replacing   





(9) DVT prophylaxis


Current Visit: No   Status: Acute   Code(s): SHY8073 -    SNOMED Code(s): 

513665172


   Comment: 


-HSQ   





(10) Full code status


Current Visit: No   Status: Acute   Code(s): Z78.9 - OTHER SPECIFIED HEALTH 

STATUS   SNOMED Code(s): 259814895

## 2020-03-19 RX ADMIN — OXYCODONE HYDROCHLORIDE AND ACETAMINOPHEN SCH MG: 500 TABLET ORAL at 07:37

## 2020-03-19 RX ADMIN — ATORVASTATIN CALCIUM SCH MG: 10 TABLET, FILM COATED ORAL at 20:55

## 2020-03-19 RX ADMIN — MAGNESIUM OXIDE TAB 400 MG (241.3 MG ELEMENTAL MG) SCH MG: 400 (241.3 MG) TAB at 07:37

## 2020-03-19 RX ADMIN — HEPARIN SODIUM SCH UNITS: 5000 INJECTION INTRAVENOUS; SUBCUTANEOUS at 13:52

## 2020-03-19 RX ADMIN — HEPARIN SODIUM SCH UNITS: 5000 INJECTION INTRAVENOUS; SUBCUTANEOUS at 20:55

## 2020-03-19 RX ADMIN — PANTOPRAZOLE SODIUM SCH MG: 40 TABLET, DELAYED RELEASE ORAL at 07:36

## 2020-03-19 RX ADMIN — MAGNESIUM OXIDE TAB 400 MG (241.3 MG ELEMENTAL MG) SCH MG: 400 (241.3 MG) TAB at 20:56

## 2020-03-19 RX ADMIN — VALSARTAN SCH MG: 160 TABLET ORAL at 07:36

## 2020-03-19 RX ADMIN — POTASSIUM CHLORIDE SCH MEQ: 1500 TABLET, EXTENDED RELEASE ORAL at 07:36

## 2020-03-19 RX ADMIN — THERA TABS SCH TAB: TAB at 07:36

## 2020-03-19 RX ADMIN — FOLIC ACID SCH MG: 1 TABLET ORAL at 07:38

## 2020-03-19 RX ADMIN — METOPROLOL SUCCINATE SCH MG: 50 TABLET, EXTENDED RELEASE ORAL at 20:55

## 2020-03-19 RX ADMIN — HEPARIN SODIUM SCH UNITS: 5000 INJECTION INTRAVENOUS; SUBCUTANEOUS at 06:27

## 2020-03-19 RX ADMIN — BUPROPION HYDROCHLORIDE SCH MG: 100 TABLET, FILM COATED, EXTENDED RELEASE ORAL at 07:38

## 2020-03-19 RX ADMIN — POTASSIUM CHLORIDE SCH MEQ: 1500 TABLET, EXTENDED RELEASE ORAL at 20:55

## 2020-03-19 NOTE — PN
Progress Note





- Progress Note


Date of Service: 03/18/20


Note: 





DElayed entry.


Patient was seen on the date of note. 


Patient feels better. Ambulates, Voids. Tolerates po well. Tolerates cervical 

collar well.





Neuro exam stable. 


AAOx3 MANE, CN II-XII grossly intact


Motor 4-5/5 except LLE foot DF, EHL 0-1/5


Sensory grossly intact to light touch except decreased sensation bellow mid 

calf level both LEs





MRI brain no acute changes


MRI T spine DDD no significant stenosis


MRI L spine DDD with moderate stenosis and NF stenosis.





Discussed in extend with patient regarding imaging findings and possible 

treatment options. Patient understands that he may benefit from a posterior 

cervical decompression and fusion. He understands risks and benefits of 

different treatment options including possible progression of myelopathy with 

permanent spinal cord injury and paralysis/death. He also understands the 

importance of timing and the possible implications of delaying surgical 

treatment including paralysis. Patient reports that he is not ready to proceed 

with surgery at this point. He is in the process of selling a farm and he would 

like to obtain a second opinion by a specialist in [employer] as recommended 

by his . Patient would like to discuss with his daughter 

regarding his options. 


Full instructions were given to the patient.


Discussed patient's wishes with  team this am.


Will be always available if needed.





Appreciate , Neurology care





PAT Rodriguez MD

## 2020-03-19 NOTE — PN
Subjective


Date of Service: 03/19/20


Interval History: 





HD6 on 3/19





64 M with history of Alcohol Use Disorder with Wernick's Encephalopathy, 

Cervical SPine Compression, HTN, Depression presented with  MUltiple falls, 

wekaness, ataxia and loss of appetite. Found to have severe cervical stenosis 

at C4-C5 and C5-C6. Deciding about surgery.





Overnight: No acute overnight events





Patient seen and examined at bedside. Patient sitting on a chair and having a 

breakfast. Patient feels he is improving every day and feels far better than 

when he came here. Although he complains of numbness and tingling on his 

fingers. He is having stool incontinent while urinating which he says me is 

present for last 3 days. He has still not decided about the surgery; wants to 

speak to his daughter. he will let us know as soon as they decide.





Objective


Active Medications: 








Acetaminophen (Tylenol Tab*)  650 mg PO Q6H PRN


   PRN Reason: FEVER/PAIN


Ascorbic Acid (Vitamin C  Tab*)  500 mg PO QAM [facility]


   Last Admin: 03/19/20 07:37 Dose:  500 mg


Atorvastatin Calcium (Lipitor*)  10 mg PO BEDTIME [facility]


   Last Admin: 03/18/20 21:15 Dose:  10 mg


Bupropion HCl (Wellbutrin Sr Tab*)  100 mg PO QAM [facility]


   Last Admin: 03/19/20 07:38 Dose:  100 mg


Folic Acid (Folvite Tab*)  1 mg PO DAILY [facility]


   Last Admin: 03/19/20 07:38 Dose:  1 mg


Heparin Sodium (Porcine) (Heparin Vial(*))  5,000 units SUBCUT Q8HR [facility]


   Last Admin: 03/19/20 13:52 Dose:  5,000 units


Magnesium Oxide (Magox 400 Tab*)  800 mg PO BID [facility]


   Last Admin: 03/19/20 07:37 Dose:  800 mg


Melatonin (Melatonin)  3 mg PO BEDTIME PRN


   PRN Reason: SLEEP


   Last Admin: 03/17/20 21:28 Dose:  3 mg


Metoprolol Succinate (Toprol Xl Tab*)  50 mg PO BEDTIME [facility]


   Last Admin: 03/18/20 21:16 Dose:  50 mg


Miscellaneous (Ativan Pyxis [place])  1 ea N/A .ATIVAN IV KEY PRN


   PRN Reason: PYXIS KEY


Multivitamins/Minerals (Theragran/Minerals Tab*)  1 tab PO DAILY [facility]


   Last Admin: 03/19/20 07:36 Dose:  1 tab


Ondansetron HCl (Zofran Inj*)  4 mg IV Q6H PRN


   PRN Reason: NAUSEA


Pantoprazole Sodium (Protonix Tab*)  40 mg PO DAILY [facility]


   Last Admin: 03/19/20 07:36 Dose:  40 mg


Potassium Chloride (Klor Con Er Tab*)  20 meq PO BID [facility]


   Last Admin: 03/19/20 07:36 Dose:  20 meq


Valsartan (Diovan Tab*)  160 mg PO QAM [facility]


   Last Admin: 03/19/20 07:36 Dose:  160 mg








 Vital Signs - 8 hr











  03/19/20 03/19/20 03/19/20





  07:15 07:47 11:15


 


Temperature 98.2 F  97.9 F


 


Pulse Rate 68  83


 


Respiratory 18 18 16





Rate   


 


Blood Pressure 142/63  107/70





(mmHg)   


 


O2 Sat by Pulse 99  100





Oximetry   











Oxygen Devices in Use Now: None


Exam: 





Appearance: Thin, white male sitting in chair, appearing comfortable and in NAD


Eyes: No Scleral Icterus, - - PERRLA


Ears/Nose/Mouth/Throat: Mucous Membranes Moist


Neck: Trachea Midline


Respiratory: Symmetrical Chest Expansion and Respiratory Effort, Clear to 

Auscultation


Cardiovascular: NL Sounds; No Murmurs; No JVD, RRR


Abdominal: - - abd soft/nontender/nondistended


Extremities: No Edema, No Clubbing, Cyanosis


Skin: No Rash or Ulcers


Neurological: Alert and Oriented x 3, - -tremor seen on left hand. CN intact. 

LUE 4/5 RUE 5/5. Sensation intact.


Result Diagrams: 


 03/18/20 05:42





 03/18/20 05:42





Assess/Plan/Problems-Billing


Assessment: 





64 M with history of Alcohol Use Disorder with Wernick's Encephalopathy, 

Cervical Spine Compression, HTN, Depression presented with  MUltiple falls, 

weakness, ataxia and loss of appetite. Found to have severe cervical stenosis 

at C4-C5 and C5-C6. Pt is deciding about surgery.








- Patient Problems


(1) Ataxia


Current Visit: Yes   Status: Acute   Code(s): R27.0 - ATAXIA, UNSPECIFIED   

SNOMED Code(s): 03586884


   Comment: 


- presented with worsening gait and fall at home, does have prior hx of Wernicke

's


- repeat Cervical MRI reveals severe canal stenosis at C4-C5 and C5-6


- appreciate neurosurgery consult


-Patient ataxia is improving  and is working with physical therapy; his 

improvement could be from thiamine repletion; although stenosis playing major 

role.


- soft cervical collar in place


-sent B12, thiamine; pending   





(2) Cervical spinal cord compression


Current Visit: No   Status: Acute   Code(s): G95.20 - UNSPECIFIED CORD 

COMPRESSION   SNOMED Code(s): 28208540


   Comment: 


- noted on cervical spine MRI at site of severe cervical stensosis


- likely contributing to ataxia


- appreciate neurosurgery consult


-Lumbar spine MRI showing moderates stenosis on L5-S1


-No acute changes on thoracic spine


-Surgical treatment recommended by Neurosurgeon but patient is still deciding 

about it and will discuss with daughter; We dicussed about deciding it earlier 

than later.


-His stool incontinence while urinating is also likely reflective of this 

compression; No other neurological sx; No caudaequina sx.    





(3) Depression


Current Visit: Yes   Status: Acute   Code(s): F32.9 - MAJOR DEPRESSIVE DISORDER

, SINGLE EPISODE, UNSPECIFIED   SNOMED Code(s): 35758437


   Comment: 


-continue wellbutrin   





(4) GERD (gastroesophageal reflux disease)


Current Visit: Yes   Status: Acute   Code(s): K21.9 - GASTRO-ESOPHAGEAL REFLUX 

DISEASE WITHOUT ESOPHAGITIS   SNOMED Code(s): 248901785


   Comment: 


-continue PPI   





(5) HLD (hyperlipidemia)


Current Visit: Yes   Status: Acute   Code(s): E78.5 - HYPERLIPIDEMIA, 

UNSPECIFIED   SNOMED Code(s): 55465457


   Comment: 


-continue statin   





(6) History of alcohol dependence


Current Visit: Yes   Status: Acute   Code(s): F10.21 - ALCOHOL DEPENDENCE, IN 

REMISSION   SNOMED Code(s): 017165174


   Comment: 


-heavy drinker before; currently only drinking 2 beers perday


-Serum alcohol on presentation <10.


-SW involved   





(7) DVT prophylaxis


Current Visit: No   Status: Acute   Code(s): HGR4846 -    SNOMED Code(s): 

160221485


   Comment: 


-HSQ   





(8) Full code status


Current Visit: No   Status: Acute   Code(s): Z78.9 - OTHER SPECIFIED HEALTH 

STATUS   SNOMED Code(s): 244283439


   


Status and Disposition: 





Inpatient


Deciding about surgery


Attending: Karey Garrido





Attestation


Documenting Resident: Ankit


Supervising Physician: Hema


Attending/Supervising Physician Comment: 





Mr. Fisher has decided that he will not have the surgery that has been offered 

by Dr. Rodriguez.  He is able to articulate his options of proceeding with 

surgery versus waiting and searching out other neurosurgeons and also clearly 

can delineate the consequences he may face if he delays surgery including 

quadriplegia.  I recommended that he have surgery done prior to discharge to 

avoid worsening cervical cord compression but he declines, explaining that he 

needs to get his finances in order at home and also research other 

neurosurgeons and get at least a second opinion.  He expresses clear thought 

process and logical reasoning and while I disagree with his decision, do 

believe that he is of sound mind to make this decision himself.  


Attestation: 


This service has been performed in part by a resident under the direction of a 

teaching physician.I, Hema, performed the service, or was physically present 

during the critical, or key portions of the service, furnished by the resident. 

I participated in the management of the patient.

## 2020-03-19 NOTE — CONS
NEUROLOGY CONSULTATION NOTE:

 

DATE OF CONSULT:  03/18/20

 

CONSULTING PROVIDER:  Dr. Rodriguez.

 

REASON FOR CONSULT:  Left footdrop.

 

CHIEF COMPLAINT:  Left footdrop.

 

HISTORY OF PRESENT ILLNESS:  Mr. Fisher is a 64-year-old right-handed man who 
has history of alcohol abuse, cervical spine spondylosis with myelopathy, 
hypertension, who presented to [facility] on 03/14/20.  The patient 
stated that he has had 3 falls throughout the night within less than 12-hour 
period.  He called his brother and informed him that he was unable to ambulate.
  He refused to come to the hospital.  His brother went to checkup on him and 
found that he was unable to stand and ambulate.  EMS was contacted and the 
patient was brought into the ED for further evaluation.  The patient denied any 
neck or low back pain.  He denied any impairment in his bowel or bladder 
function. The patient stated that he stopped consuming alcohol for the past 10 
days since today.  For the past few years, he has been averaging 3 beers a day, 
but he was drinking more than that in the prior years.  The patient denied any 
weakness other than he was found to have a left footdrop by PT yesterday.  The 
patient stated that he was unaware of the footdrop. The patient states that he 
has no paraesthesias or pain.  He sometimes cannot  feel the bottom of his 
feet.  He was brought in for further evaluation.

 

The patient had a brain CT that showed diffuse cortical atrophy with no 
evidence of acute intracranial abnormality.  An MRI of the cervical spine 
without contrast was obtained and showed degenerative disk disease with severe 
narrowing of the central canal at C4-C5 and C5-C6 with moderate narrowing at C6-
C7 and mild narrowing at C3-4.  There is multilevel neural foraminal narrowing.
  There is also elevated cord signal with volume loss opposite of the C4 and C5 
consistent with myelomalacia, presumably from a compressive myelopathy given 
the associated spinal stenosis.

 

Due to his footdrop, Dr. Rodriguez had recommended an MRI of the brain, lumbar 
spine, thoracic spine.  The MRI of the brain showed significant amount of brain 
stem and cortical atrophy with no evidence of mass effect, tumors, or stroke.  
The lumbar spine MRI showed evidence of multilevel neural foraminal narrowing 
with degenerative disk and osteoarthritis was pronounced along the lower lumbar 
spine with moderate narrowing of the central canal at L4-S1 with mild narrowing 
at L3-L4 and L4-L5.  The patient also had an MRI of the thoracic spine that 
showed hematopoietic conversion of fatty marrow, which can be seen with chronic 
anemia. There was degenerative disk disease and osteoarthritis without 
significant neural foraminal narrowing.  He had a transthoracic echo today that 
was notable for an ejection fraction of 55% to 60%.  According to CARLINE Husain, the patient had received high-dose thiamine treatment over the past 
few days and his gait instability and tremors have improved.  The patient also 
endorses mild improvement.

 

LABORATORY DATA:  The patient had a WBC of 6.6, hemoglobin of 12, hematocrit of 
38, platelet count of 227, magnesium of 1.8.

 

PAST MEDICAL HISTORY:  Cervical spine compression, alcohol abuse, hypertension, 
dyslipidemia.

 

MEDICATIONS:

1.  Ascorbic acid.

2.  Bupropion 100 mg p.o. in the morning.

3.  Valsartan 160 mg p.o. in the morning.

4.  Simvastatin 20 mg p.o. at bedtime.

5.  Omega-3.

6.  Metoprolol 50 mg p.o. at bedtime.

7.  Saw palmetto 450 p.o. daily.

8.  Omeprazole 20 mg p.o. daily.

9.  Acetaminophen 650 mg p.o. q.6 hours.

10.  Magnesium 800 mg p.o. daily.

 

ALLERGIES:  HYDROCHLOROTHIAZIDE, LISINOPRIL, CITALOPRAM.

 

SOCIAL HISTORY:  The patient lives alone.  He is not .  He is a former 
smoker.  He has been drinking 2 to 3 beers prior to 10 days ago.

 

REVIEW OF SYSTEMS:  A 14-point review of systems was obtained, otherwise 
negative except for what was mentioned in the HPI.  The patient states that 
during the falls, he had denied any loss of consciousness or head injury.  
Please note that the patient has lost approximately 15 pounds over the period 
of 3 months.

 

PHYSICAL EXAMINATION:  Vital Signs:  Temperature of 97.7, pulse rate of 88, 
respiratory rate of 16, oxygen saturation of 100, blood pressure of 112/72. 
General:  A chronic ill-appearing man, in no acute distress.  Head:  Atraumatic
, normocephalic without any obvious abnormality.  Neck is supple.  He has got a 
soft cervical collar.  Eyes:  Conjunctivae/corneas are clear.  Cardiovascular:  
Regular rate and rhythm with normal S1, S2.  Pulmonary:  Clear to auscultation 
bilaterally with no wheezing or rhonchi.  Extremities:  No cyanosis or edema.  
Skin:  No skin lesions or lacerations.  Psych:  Affect is broad, normal mood.  
Neurological Examination:  Mental Status:  Awake, alert, oriented to person, 
place, time, and general circumstances.  Speech and language including 
repetition, fluency, comprehension, and naming were assessed and found to be 
normal.  Cranial Nerves: Pupils equal, round, reactive to light.  Extraocular 
muscles are intact.  There is no ptosis. He has normal sensation in the face 
bilaterally.  He has normal facial asymmetry.  Tongue is symmetric in midline 
with no atrophy or fasciculation.  Motor Examination:  The patient has distal 
lower extremity atrophy.  He also has increased tone and spasticity of both 
lower extremities.  He has upper motor neuron pyramidal weakness of the left 
upper and lower extremity with more weakness in the deltoid, triceps, hip 
flexors, knee flexors, and definitely with ankle dorsiflexion inversion greater 
than eversion and extensor hallucis longus.  The proximal left upper extremity 
weakness is graded as 4/5, the proximal lower extremity weakness is 4/5 and the 
distal lower extremity weakness is 3+ to 4-/5.  He has mild weakness to the 
extensor hallucis longus on the right graded as 4+/5.  Otherwise, he has got 5/
5 strength throughout.  Sensation is intact to light touch and pinprick.  There 
is a sensory level at C6-C7 to pinprick.  He has absent vibration at the toes, 
medial malleolus.  Reflexes:  Hyperreflexia graded as 3+ in the biceps, triceps
, brachioradialis, knees but 0 at the ankles.  He has got upgoing plantar 
responses on the right, new on the left.  He has got positive crossed adductor 
signs bilaterally.  Positive Saleh's signs bilaterally.  Coordination:  
Normal finger- to-nose testing bilaterally.  He does have a tremor that is 
worse on extension. The patient has not noticed any improvement in the tremor 
with alcohol because he has not noticed a tremor outside this admission.  I 
cannot appreciate any asterixis.  Gait:  Wide-based spastic gait with positive 
Romberg sign.

 

Labs, imaging, and other diagnostic testing as mentioned in the HPI.

 

ASSESSMENT AND RECOMMENDATIONS:  Mr. Tanner Fisher is a 64-year-old man with 
history of alcohol abuse with frequent falls due to unsteady gait, ataxia, 
spasticity, and lower extremity weakness.

 

The patient's examination is notable for upper motor neuron weakness involving 
the left upper and lower extremity, spasticity, and hyperreflexia, and absence 
of the reflex in the ankles as well as absent of the vibration and 
proprioception sensation of the great toes bilaterally.  He does have evidence 
of footdrop on the left side, but also this is associated with an upper motor 
neuron pattern of weakness on the left upper and lower extremities.  This 
pattern of weakness is caused by the cervical spine disease, which can be 
explained by a high pressure exerted on the anterior cervical cord due to the 
cervical spinal canal stenosis that is attributing to the central cause of left 
footdrop.  Again, please note that the footdrop is extensively manifested on 
his examination, but he also has other areas where he is weak including the 
left upper extremity.  



I do suspect that his ataxia is related to the combination of cervical 
spondylosis with myelopathy and spasticity which is superimposed to underlying 
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 
to proximal length dependent sensory loss in the legs).  The reported 
improvement in his gait and tremors is probably due to thiamine repletion as a 
result of underlying vitamin B1 deficiency.  I do not think he has Wernicke's 
encephalopathy since he is not confused nor does he have any ophthalmoplegia on 
examination.  I do agree on continuing the thiamine repletion at least 100 mg a 
day, indefinitely. The tremors are attributable to alcohol withdrawal effect, 
which seems to be clinically improving.

 

I had a long discussion with the patient today.  I informed him that he has 
severe cervical spondylosis meaning degenerative disk disease that is 
compressing on the cervical spine and that will eventually result in 
quadriplegia.  He is contemplating surgery, at least he does not want to have 
surgery at [facility].  I have informed him that the longer that he 
takes from deviating away from surgery the worse he may get.  I also walked the 
patient to a nearby computer and reviewed the cervical spine imaging so he can 
acknowledge the degree of spine compression. 



The patient will have further discussion with Dr. Rodriguez and the primary 
team tomorrow regarding surgery. 



I do not recommend any further workup.  I advised the patient to wean himself 
off the alcohol for which he has been sober for 10 days.   

 

I answered all the patient's questions to the best of my ability.  I urged the 
patient to make a decision sooner than later regarding surgery in order to 
prevent any further deterioration of his neurological exam. 

 

 [id number] #: [id number]

HRIAL

## 2020-03-20 RX ADMIN — FOLIC ACID SCH MG: 1 TABLET ORAL at 09:27

## 2020-03-20 RX ADMIN — BUPROPION HYDROCHLORIDE SCH MG: 100 TABLET, FILM COATED, EXTENDED RELEASE ORAL at 09:27

## 2020-03-20 RX ADMIN — METOPROLOL SUCCINATE SCH MG: 50 TABLET, EXTENDED RELEASE ORAL at 20:22

## 2020-03-20 RX ADMIN — VALSARTAN SCH MG: 160 TABLET ORAL at 09:27

## 2020-03-20 RX ADMIN — OXYCODONE HYDROCHLORIDE AND ACETAMINOPHEN SCH MG: 500 TABLET ORAL at 09:26

## 2020-03-20 RX ADMIN — POTASSIUM CHLORIDE SCH MEQ: 1500 TABLET, EXTENDED RELEASE ORAL at 09:26

## 2020-03-20 RX ADMIN — HEPARIN SODIUM SCH UNITS: 5000 INJECTION INTRAVENOUS; SUBCUTANEOUS at 14:57

## 2020-03-20 RX ADMIN — POTASSIUM CHLORIDE SCH MEQ: 1500 TABLET, EXTENDED RELEASE ORAL at 20:22

## 2020-03-20 RX ADMIN — THERA TABS SCH TAB: TAB at 09:26

## 2020-03-20 RX ADMIN — ATORVASTATIN CALCIUM SCH MG: 10 TABLET, FILM COATED ORAL at 20:23

## 2020-03-20 RX ADMIN — MAGNESIUM OXIDE TAB 400 MG (241.3 MG ELEMENTAL MG) SCH MG: 400 (241.3 MG) TAB at 09:27

## 2020-03-20 RX ADMIN — PANTOPRAZOLE SODIUM SCH MG: 40 TABLET, DELAYED RELEASE ORAL at 09:26

## 2020-03-20 RX ADMIN — MAGNESIUM OXIDE TAB 400 MG (241.3 MG ELEMENTAL MG) SCH MG: 400 (241.3 MG) TAB at 20:23

## 2020-03-20 RX ADMIN — HEPARIN SODIUM SCH UNITS: 5000 INJECTION INTRAVENOUS; SUBCUTANEOUS at 20:25

## 2020-03-20 RX ADMIN — HEPARIN SODIUM SCH UNITS: 5000 INJECTION INTRAVENOUS; SUBCUTANEOUS at 05:52

## 2020-03-20 NOTE — DS
CC:  Dr. Althea Rodriguez *

 

DISCHARGE SUMMARY:

 

DATE OF ADMISSION:  03/14/20

 

DATE OF DISCHARGE:  03/20/20

 

PRINCIPAL DISCHARGE DIAGNOSES:

1.  Severe cervical spine central canal stenosis with moderate narrowing with 
compressive myelopathy.

2.  Ambulatory dysfunction and falls.

3.  Thiamine deficiency with history of Wernicke's encephalopathy.

4.  Footdrop.

 

SECONDARY DISCHARGE DIAGNOSES:

1.  History of alcohol use disorder.

2.  Hypertension.

3.  Hyperlipidemia.

 

DISCHARGE MEDICATIONS:

1.  Ascorbic acid 500 mg daily.

2.  Wellbutrin 100 mg daily.

3.  Valsartan 160 mg daily.

4.  Simvastatin 20 mg q.h.s.

5.  Omega-3 fatty acids 1000 mg daily.

6.  Toprol-XL 50 mg q.h.s.

7.  Omeprazole 20 mg daily.

8.  Multivitamin 1 tab daily.

9.  Tylenol 650 q.6 p.r.n. pain.

10.  Magnesium oxide 800 mg daily.

11.  Folic acid 1 mg daily.

12.  Melatonin 3 mg q.h.s. p.r.n. insomnia.

13.  KCl 20 mEq b.i.d.

14.  Thiamine 100 mg daily.

 

PHYSICAL EXAMINATION:  Temperature 98.6, heart rate 92, respiratory rate 21, 
pulse ox 100% on room air, blood pressure 110/71.  General:  Alert, thin man, 
in no distress, sitting in the chair.  HEENT:  Pupils are equal, round, and 
reactive to light.  Oral mucosa is moist.  No nystagmus.  Neck:  A Dauphin J 
collar is in place. Chest:  He is in a regular rate and rhythm with no murmurs.
  His lungs are clear bilaterally.  Abdomen:  Soft, nontender, nondistended.  
Extremities:  No edema, rashes, or ulcers.  Neurologic:  He is oriented, 
appropriate, alert.  His upper extremity strength is 5/5.  His lower extremity 
strength is 5/5.  His patellar deep tendon reflexes are hyperreflexic at 3+.  
He has increased tone and spasticity of both lower extremities.  He has an 
intention tremor and no asterixis.  He has a wide-based gait.

 

PERTINENT STUDIES ON THIS HOSPITALIZATION:  

A cervical spine MRI on 03/16/20 showed:

1.  Degenerative disk disease and osteoarthritis.

2.  There is severe narrowing of central canal at C4-C5 and C5-C6 with moderate 
narrowing at C6-C7 and mild narrowing at C3-C4.  There is multilevel neural 
foraminal narrowing as described above.  There is elevated cord signal and 
volume loss opposite of C4 and C5 consistent with myelomalacia presumably from 
a compressive myelopathy given the associated spinal stenosis.

 

Brain MRI:  Diffuse involutional change.

 

Lumbar spine MRI:  Degenerative disk disease and osteoarthritis most pronounced 
along the lower lumbar spine, moderate narrowing of the central canal at L5-S1 
with mild narrowing at L3-L4 and L4-L5.  There is multilevel neural foraminal 
narrowing as described above.

 

A thoracic spine MRI showed hematopoietic conversion of fatty marrow, which can 
be seen with chronic anemia, chronic illness or in smokers; degenerative disk 
disease and osteoarthritis without significant neural foraminal narrowing or 
central canal stenosis.

 

Transthoracic echocardiogram showed systolic function of the LV is normal at 55
% to 60%.  Right ventricular systolic function is normal.  Mitral valve:  Trace 
regurgitation.

 

CONSULTATIONS DURING THIS HOSPITALIZATION:  Dr. Rodriguez of Neurosurgery and 
Dr. Carlson of Neurology.

 

HOSPITAL COURSE BY PROBLEM:

1.  Severe C-spine central canal stenosis with moderate narrowing and 
compressive myelopathy.  Mr. Fisher was admitted with ambulatory dysfunction 
and falls and a C- spine MRI was obtained, which showed the findings as above.  
Dr. Rodriguez of Neurosurgery was consulted and offered Mr. Fisher urgent 
surgery.  Unfortunately, Mr. Fisher has declined his offer of surgery and is 
able to clearly delineate the possible consequences of deferring surgery at 
this time.  He would like to be discharged and get a second opinion in [place] and is able to clearly articulate the possible consequences including 
quadriplegia and expresses clear understanding and thought process in regards 
to this decision.  His daughter is in support of him making this decision.

2.  Ambulatory dysfunction with falls.  This was thought to be multifactorial 
and related to problem #1 in addition to thiamine deficiency and history of 
Wernicke's encephalopathy.  He was initially placed on IV thiamine; however, it 
was felt that this was not consistent with Wernicke's encephalopathy, so he was 
transitioned to oral thiamine.

 

DISPOSITION:  Mr. Fisher is being discharged to home on 03/20/20 after 
declining our recommendation of neurosurgery.  Again, I have strongly 
recommended that he proceed with surgery on this hospitalization and not delay 
the surgery.  I have expressed my concern that postponing this surgery may lead 
to worsening cervical cord injury and paralysis.  He understands and accepts 
this risk and does not wish to pursue surgery at this institution.  We have 
recommended short-term rehab; however, it was declined by his insurance company 
and I offered to do a peer-to- peer appeal; however, Mr. Fisher does not wish 
to go to short-term rehab at this time and wants to go home where he lives 
independently.  We have arranged home services for him.

 

CONDITION AT THE TIME OF DISCHARGE:  Guarded.

 

 [id number]/CPS #: [id number]

HIRAL

## 2020-03-21 VITALS — DIASTOLIC BLOOD PRESSURE: 69 MMHG | SYSTOLIC BLOOD PRESSURE: 110 MMHG

## 2020-03-21 RX ADMIN — THERA TABS SCH TAB: TAB at 09:37

## 2020-03-21 RX ADMIN — HEPARIN SODIUM SCH UNITS: 5000 INJECTION INTRAVENOUS; SUBCUTANEOUS at 04:52

## 2020-03-21 RX ADMIN — MAGNESIUM OXIDE TAB 400 MG (241.3 MG ELEMENTAL MG) SCH MG: 400 (241.3 MG) TAB at 09:37

## 2020-03-21 RX ADMIN — POTASSIUM CHLORIDE SCH MEQ: 1500 TABLET, EXTENDED RELEASE ORAL at 09:37

## 2020-03-21 RX ADMIN — OXYCODONE HYDROCHLORIDE AND ACETAMINOPHEN SCH MG: 500 TABLET ORAL at 09:37

## 2020-03-21 RX ADMIN — BUPROPION HYDROCHLORIDE SCH MG: 100 TABLET, FILM COATED, EXTENDED RELEASE ORAL at 09:36

## 2020-03-21 RX ADMIN — PANTOPRAZOLE SODIUM SCH MG: 40 TABLET, DELAYED RELEASE ORAL at 09:37

## 2020-03-21 RX ADMIN — VALSARTAN SCH MG: 160 TABLET ORAL at 09:36

## 2020-03-21 RX ADMIN — FOLIC ACID SCH MG: 1 TABLET ORAL at 09:37

## 2020-04-23 ENCOUNTER — HOSPITAL ENCOUNTER (EMERGENCY)
Dept: HOSPITAL 25 - ED | Age: 65
Discharge: LEFT BEFORE DISCHARGE | End: 2020-04-23
Payer: COMMERCIAL

## 2020-04-23 VITALS — DIASTOLIC BLOOD PRESSURE: 77 MMHG | SYSTOLIC BLOOD PRESSURE: 153 MMHG

## 2020-04-23 DIAGNOSIS — M50.00: Primary | ICD-10-CM

## 2020-04-23 DIAGNOSIS — Z72.0: ICD-10-CM

## 2020-04-23 DIAGNOSIS — Z86.79: ICD-10-CM

## 2020-04-23 DIAGNOSIS — F41.9: ICD-10-CM

## 2020-04-23 DIAGNOSIS — E78.00: ICD-10-CM

## 2020-04-23 DIAGNOSIS — I10: ICD-10-CM

## 2020-04-23 DIAGNOSIS — M48.02: ICD-10-CM

## 2020-04-23 DIAGNOSIS — R60.9: ICD-10-CM

## 2020-04-23 DIAGNOSIS — K21.9: ICD-10-CM

## 2020-04-23 LAB
ALBUMIN SERPL BCG-MCNC: 3.9 G/DL (ref 3.2–5.2)
ALBUMIN/GLOB SERPL: 1.1 {RATIO} (ref 1–3)
ALP SERPL-CCNC: 94 U/L (ref 34–104)
ALT SERPL W P-5'-P-CCNC: 51 U/L (ref 7–52)
ANION GAP SERPL CALC-SCNC: 12 MMOL/L (ref 2–11)
AST SERPL-CCNC: 61 U/L (ref 13–39)
BASOPHILS # BLD AUTO: 0.1 10^3/UL (ref 0–0.2)
BUN SERPL-MCNC: 9 MG/DL (ref 6–24)
BUN/CREAT SERPL: 12.5 (ref 8–20)
CALCIUM SERPL-MCNC: 8.8 MG/DL (ref 8.6–10.3)
CHLORIDE SERPL-SCNC: 104 MMOL/L (ref 101–111)
EOSINOPHIL # BLD AUTO: 0 10^3/UL (ref 0–0.6)
GLOBULIN SER CALC-MCNC: 3.4 G/DL (ref 2–4)
GLUCOSE SERPL-MCNC: 84 MG/DL (ref 70–100)
HCO3 SERPL-SCNC: 26 MMOL/L (ref 22–32)
HCT VFR BLD AUTO: 43 % (ref 42–52)
HGB BLD-MCNC: 14.8 G/DL (ref 14–18)
INR PPP/BLD: 1.03 (ref 0.82–1.09)
LYMPHOCYTES # BLD AUTO: 0.8 10^3/UL (ref 1–4.8)
MCH RBC QN AUTO: 35 PG (ref 27–31)
MCHC RBC AUTO-ENTMCNC: 34 G/DL (ref 31–36)
MCV RBC AUTO: 102 FL (ref 80–94)
MONOCYTES # BLD AUTO: 0.4 10^3/UL (ref 0–0.8)
NEUTROPHILS # BLD AUTO: 4.9 10^3/UL (ref 1.5–7.7)
NRBC # BLD AUTO: 0 10^3/UL
NRBC BLD QL AUTO: 0
PLATELET # BLD AUTO: 433 10^3/UL (ref 150–450)
POTASSIUM SERPL-SCNC: 3.4 MMOL/L (ref 3.5–5)
PROT SERPL-MCNC: 7.3 G/DL (ref 6.4–8.9)
RBC # BLD AUTO: 4.26 10^6 /UL (ref 4.18–5.48)
SODIUM SERPL-SCNC: 142 MMOL/L (ref 135–145)
WBC # BLD AUTO: 6.2 10^3/UL (ref 3.5–10.8)

## 2020-04-23 PROCEDURE — 99283 EMERGENCY DEPT VISIT LOW MDM: CPT

## 2020-04-23 PROCEDURE — 82140 ASSAY OF AMMONIA: CPT

## 2020-04-23 PROCEDURE — 80053 COMPREHEN METABOLIC PANEL: CPT

## 2020-04-23 PROCEDURE — G0480 DRUG TEST DEF 1-7 CLASSES: HCPCS

## 2020-04-23 PROCEDURE — 36415 COLL VENOUS BLD VENIPUNCTURE: CPT

## 2020-04-23 PROCEDURE — 85025 COMPLETE CBC W/AUTO DIFF WBC: CPT

## 2020-04-23 PROCEDURE — 83880 ASSAY OF NATRIURETIC PEPTIDE: CPT

## 2020-04-23 PROCEDURE — 80320 DRUG SCREEN QUANTALCOHOLS: CPT

## 2020-04-23 PROCEDURE — 85610 PROTHROMBIN TIME: CPT

## 2020-04-23 NOTE — XMS REPORT
Patient Summary Document

 Created on:April 3, 2020



Patient:HUMA GARCÍA Unknown

Sex:Male

:1955

External Reference #:[id number]





Demographics







 Address  [address] [place] [address]

 

 Home Phone  [phone]

 

 Preferred Language  unk

 

 Marital Status  Unknown

 

 Taoism Affiliation  Unknown

 

 Race  Unknown

 

 Ethnic Group  Unknown









Author







 Organization  [facility]









Support







 Name  Relationship  Address  Phone

 

 LISA SCHROEDER, Unknown Name  Jane  Unknown Address  [phone]









Care Team Providers







 Name  Role  Phone

 

 Unavailable  Unavailable  Unavailable









Problems







 Condition  Condition  Condition  Status  Onset  Resolution  Last  Treating  
Comments



 Name  Details  Category    Date  Date  Treatment  Clinician  



             Date    

 

 Wernicke's  Wernicke's  Diagnosis  Active        Magan  



 encephalopa  encephalopa      3-23      Anguish  



 thy  thy            [id number]  

 

 Unspecified  Unspecified  Diagnosis  Active        Magan  



 cord  cord      3-23      Anguish  



 compression  compression            [id number]  

 

 Neuro  anxiety  Neuro/Emot  Active        Tegan  



   present  ion    3-24      (Fanta)  



         09:30:      Vann  



         00      [id number]  

 

 Neuro  depressive  Neuro/Emot  Active        Tegan  



   feelings  ion    3-24      (Fanta)  



   present      09:30:      Vann  



         00      [id number]  







Allergies, Adverse Reactions, Alerts







 Allergy Name  Allergy  Status  Severity  Reaction(s)  Onset  Inactive  
Treating  Comments



   Type        Date  Date  Clinician  

 

 citalopram  Unknown  Active  Unknown  Reaction      Tegan  



         Unknown  3-30    (Fanta)  



               Soo  



               [id number]  

 

 hydrochlorithia  Unknown  Active  Unknown  Reaction      Tegan  



 zide        Unknown  3-30    (Fanta)  



               Soo  



               [id number]  

 

 lisinopril  Unknown  Active  Unknown  Reaction      Tegan  



         Unknown  3-30    (Fanta)  



               Soo  



               [id number]  







Medications







 Ordered  Filled  Start  Stop  Current  Ordering  Indication  Dosage  Frequency
  Signature  Comments  Components



 Medication  Medication  Date  Date  Medication?  Clinician        (SIG)    



 Name  Name                    

 

 multivitami  multivitami      Yes  John    Unknown  Unknown      



 n capsule  n capsule  3-24      Apryl RODRIGUEZ            

 

 valsartan  valsartan  -0    Yes  John    Unknown  Unknown      



 160 mg  160 mg  3-24      Apryl RODRIGUEZ            



 tablet  tablet                    

 

 buPROPion  buPROPion  -0    Yes  John    Unknown  Unknown      



 HCL   HCL   3-24      Apryl RODRIGUEZ            



 mg  mg                    



 tablet,12  tablet,12                    



 hr  hr                    



 sustained-r  sustained-r                    



 elease  elease                    

 

 ascorbic  ascorbic  -0    Yes  John    Unknown  Unknown      



 acid  acid  3-24      Apryl RODRIGUEZ            



 (vitamin C)  (vitamin C)                    



 500 mg  500 mg                    



 capsule  capsule                    

 

 omeprazole  omeprazole  -0    Yes  John    Unknown  Unknown      



 20 mg  20 mg  3-24      MD,Apryl            



 tablet,marlena  tablet,marlena                    



 yed release  yed release                    

 

 omega  omega  2020-0    Yes  John    Unknown  Unknown      



 3-dha-epa-f  3-dha-epa-f  3-24      MD,Apryl            



 rosaura oil  rsoaura oil                    



 1,000 mg  1,000 mg                    



 (120 mg-180  (120 mg-180                    



 mg) capsule  mg) capsule                    

 

 metoprolol  metoprolol  -0    Yes  John    Unknown  Unknown      



 succinate  succinate  3-24      MD,Apryl            



 ER 50 mg  ER 50 mg                    



 tablet,exte  tablet,exte                    



 nded  nded                    



 release 24  release 24                    



 hr  hr                    

 

 simvastatin  simvastatin  -0    Yes  John    Unknown  Unknown      



 20 mg  20 mg  3-24      MD,Apryl            



 tablet  tablet                    







Vital Signs







 Vital Name  Observation Time  Observation Value  Comments

 

 SYSTOLIC mm[Hg]  2020 18:11:04  128 mm[Hg] mm[Hg]  Method: Sit

 

 DIASTOLIC mm[Hg]  2020 18:11:04  64 mm[Hg] mm[Hg]  Method: Sit

 

 PULSE  2020 18:11:04  87 /min /min  

 

 RESP RATE  2020 18:11:04  15 /min /min  

 

 TEMP  2020 18:11:04  98.9 [degF]  







Procedures

This patient has no known procedures.



Results

This patient has no known results.

## 2020-04-23 NOTE — ED
Complex/Multi-Sys Presentation





- HPI Summary


HPI Summary: 


64 y/o M [facility]/o swelling in his bilateral feet and fingers 

which has been intermittent for several years after he fell down a flight of 

stairs. He reports gradually worsening numbness in feet and lower legs which 

makes it difficult to ambulate. He reports weakness in left leg which is 

chronic. He additionally c/o multiple areas of ecchymosis on his upper 

extremities. He is not on anticoagulants. Hx frequent falls. Dx spinal stenosis 

1 month ago. Admitted on 3/14. Had MRI of CTL Spine. C Spine MRI had elevated 

cord signal at C4 and C5. L Spine MRI showed multilevel disease. T Spine MRI 

showed degenerative disease. Dr. Carlson and Dr. Rodriguez consulted. Dr. Rodriguez recommended cervical decompression but patient declined for second 

opinion. They thought his symptoms were related to cervical spondylosis as well 

as alcohol related neuropathy. He has not been able to get a second opinion yet 

d/t COVID pandemic. Hasn't had fall since d/c on 3/21. Medications reviewed. 

Allergies noted.





- History Of Current Complaint


Chief Complaint: EDGeneral


Time Seen by Provider: 04/23/20 09:24


Hx Obtained From: Patient, Medical Records


Onset/Duration: Still Present


Timing: Intermittent, Lasting:


Aggravating Factor(s): Nothing


Alleviating Factor(s): Nothing





- Allergies/Home Medications


Allergies/Adverse Reactions: 


 Allergies











Allergy/AdvReac Type Severity Reaction Status Date / Time


 


hydrochlorothiazide Allergy Severe See Comment Verified 04/23/20 09:22


 


lisinopril Allergy Intermediate Coughing Verified 04/23/20 09:22


 


citalopram AdvReac  See Comment Verified 04/23/20 09:22











Home Medications: 


 Home Medications





Ascorbic Acid TAB* [Vitamin C  TAB*] 500 mg PO DAILY 01/13/13 [History 

Confirmed 04/23/20]


Simvastatin TAB(NF) [Zocor 20 MG (NF)] 20 mg PO QPM 01/06/16 [History Confirmed 

04/23/20]


Valsartan TAB* [Diovan TAB*] 160 mg PO DAILY 01/06/16 [History Confirmed 04/23/ 20]


buPROPion SR TAB* [Wellbutrin SR TAB*] 100 mg PO DAILY 01/06/16 [History 

Confirmed 04/23/20]


Metoprolol Succinate XL TAB* [Toprol XL TAB*] 50 mg PO DAILY 03/08/18 [History 

Confirmed 04/23/20]


Multivitamins/Minerals TAB* [Theragran/minerals TAB*] 1 tab PO DAILY 03/08/18 [

History Confirmed 04/23/20]


Omega-3 Fatty Acids [Omega-3] 1,000 mg PO DAILY 03/08/18 [History Confirmed 04/ 23/20]


Omeprazole CAP (NF) [Prilosec CAP* 20 MG] 20 mg PO DAILY 03/08/18 [History 

Confirmed 04/23/20]


Magnesium Oxide TAB* [MagOx 400 TAB*] 800 mg PO DAILY  tab 03/20/18 [Rx 

Confirmed 04/23/20]


Saw [place] Fruit/Zinc Picoli [Saw [place] Extract] 1 cap PO BEDTIME 04/23/20 

[History Confirmed 04/23/20]


Varicella-Zoster Ge/As01b/Pf [Shingrix] 50 mcg IM ONCE 04/23/20 [History 

Confirmed 04/23/20]











PMH/Surg Hx/FS Hx/Imm Hx


Endocrine/Hematology History: 


   Denies: Hx Diabetes


Cardiovascular History: Reports: Hx Hypertension, Other Cardiovascular Problems/

Disorders - high cholesterol


   Denies: Hx Pacemaker/ICD


GI History: Reports: Hx Gastroesophageal Reflux Disease - ON MEDICATION FOR


 History: Reports: Other  Problems/Disorders - prostate


Sensory History: Reports: Hx Cataracts - left eye, Hx Contacts or Glasses


Opthamlomology History: Reports: Hx Cataracts - left eye, Hx Contacts or Glasses


Psychiatric History: Reports: Hx Anxiety - ON MEDICATION FOR





- Cancer History


Hx Chemotherapy: No





- Surgical History


Surgical History: Yes


Surgery Procedure, Year, and Place: CATARACTS BILATERAL


Hx Anesthesia Reactions: No


Infectious Disease History: No


Infectious Disease History: 


   Denies: Traveled Outside the [place] in Last 30 Days





- Family History


Known Family History: 


   Negative: Cardiac Disease





- Social History


Alcohol Use: Daily


Alcohol Amount: 2-3 beers per day per pt


Hx Substance Use: Yes


Substance Use Type: Reports: Marijuana


Hx Tobacco Use: Yes


Smoking Status (MU): Current Some Day Smoker


Type: Cigars


Amount Used/How Often: 3/4 PPD X 25 YEARS


Have You Smoked in the Last Year: No





Review of Systems


Positive: Other - swelling in his bilateral feet and fingers


Positive: Other - multiple areas of ecchymosis on his upper extremities


Positive: Weakness - left leg, Numbness - feet and lower legs 


All Other Systems Reviewed And Are Negative: Yes





Physical Exam





- Summary


Physical Exam Summary: 





Constitutional: Well-developed, Well-nourished, Alert. (-) Distressed


Skin: Warm, Dry; scant ecchymosis to the upper extremities


HENT: Normocephalic; Atraumatic. C collar in place


Eyes: Conjunctiva normal


Neck: Musculoskeletal ROM normal neck. (-) JVD, (-) Stridor, (-) Nuchal rigidity


Cardio: Rhythm regular, rate normal, Heart sounds normal; Intact distal pulses; 

Radial pulses are 2+ and symmetric. (-) Murmur


Pulmonary/Chest wall: Effort normal. (-) Respiratory distress, (-) Wheezes, (-) 

Rales


Abd: Soft, (-) tenderness, (-) Distension, (-) Guarding, (-) Rebound


Musculoskeletal: Trace edema to the bilateral feet. 2+ DP pulses


Neuro: Alert, Oriented x3; left foot has 2/5 dorsal flexion strength, otherwise 

5/5 strength


Psych: Mood and affect Normal





Triage Information Reviewed: Yes


Vital Signs On Initial Exam: 


 Initial Vitals











Temp Pulse Resp BP Pulse Ox


 


 98.2 F   95   19   168/99   99 


 


 04/23/20 09:17  04/23/20 09:17  04/23/20 09:17  04/23/20 09:17  04/23/20 09:17











Vital Signs Reviewed: Yes





Procedures





- Sedation


Patient Received Moderate/Deep Sedation with Procedure: No





Diagnostics





- Vital Signs


 Vital Signs











  Temp Pulse Resp BP Pulse Ox


 


 04/23/20 09:17  98.2 F  95  19  168/99  99














- Laboratory


Result Diagrams: 


 04/23/20 09:48





 04/23/20 09:48


Lab Statement: Any lab studies that have been ordered have been reviewed, and 

results considered in the medical decision making process.





Re-Evaluation





- Re-Evaluation


  ** First Eval


Re-Evaluation Time: 10:43 - patient declines to see Dr. Rodriguez again





  ** Second Eval


Re-Evaluation Time: 12:45


Change: Unchanged - patient does not want to stay for NSGY eval. Patient is 

AAOx4, observed in ED for sobriety, now with clear sensorium, no signs of 

intoxication, no SI/HI, a normal gait and normal speech pattern and capacity to 

refuse care.  I explained to the patient the risks of leaving AMA to include 

paralysis, death, disability, and loss of function.  I had an extensive 

conversation with the patient regarding return precautions and encouraged them 

to return sooner for any worsening condition, new symptoms or ANY other 

concerns.





Complex Multi-Symp Course/Dx


Course Of Treatment: 64 y/o male w hx EtOH abuse, recent admission for weakness 

found to have C4-5 cervical cord compression pw similar symptoms.  - Exam 

similar to prior w numbness below bilateral knees, weakness of L foot on 

dorsiflexion. D/w patient I would like him to see NSGY as he would benefit from 

fusion as per the last note. Patient declines. I d/w patient my concern for 

paralysis, he still declines. EtOH level 145, observed in ED for 3 hours. 

Clinically sober. Ambulated w walker.





- Diagnoses


Provider Diagnoses: 


 Cervical myelopathy, Cervical stenosis of spine








- Critical Care Time


Critical Care Statement: Critical care time is provided exclusive of any time 

spent performing procedures.





Discharge ED





- Sign-Out/Discharge


Documenting (check all that apply): Patient Departure





- Discharge Plan


Condition: Stable


Disposition: AGAINST MEDICAL ADVICE


Patient Education Materials:  Degenerative Disc Disease (ED)


Referrals: 


Althea Rodriguez MD [Primary Care Provider] - 


Additional Instructions: 


You were seen in the emergency department for this of your legs and difficulty 

walking.  This is likely progression of your known cervical disease.  We advise 

you follow up with Dr. Milner and return for worsening symptoms.  Our 

concern is that with the severity, of your disease, you could become paralyzed 

at any time. We recommend that you stay for admission however you declined, if 

you reconsider, please come back to the ER


Please follow up with your primary care doctor in next 2-3 days and return to 

emergency department for worsening or concerning symptoms.


It was a pleasure taking care of you today.





- Billing Disposition and Condition


Condition: STABLE


Disposition: Against Medical Advice





- Attestation Statements


Document Initiated by Scribe: Yes


Documenting Scribe: Sparkle De Santiago


Provider For Whom Hugoibcornel is Documenting (Include Credential): Johnna Holland MD


Scribe Attestation: 


Sparkle MARX, scribed for Johnna Holland MD on 04/23/20 at 1456. 


Scribe Documentation Reviewed: Yes


Provider Attestation: 


The documentation as recorded by the scribe, Sparkle De Santiago accurately reflects 

the service I personally performed and the decisions made by me, Johnna Holland MD


Status of Hugoibcornel Document: Viewed

## 2020-04-23 NOTE — XMS REPORT
Patient Summary Document

 Created on:2020



Patient:HUMA GARCÍA Unknown

Sex:Male

:1955

External Reference #:[id number]





Demographics







 Address  [address] [place] [address]

 

 Home Phone  [phone]

 

 Preferred Language  unk

 

 Marital Status  Unknown

 

 Jew Affiliation  Unknown

 

 Race  Unknown

 

 Ethnic Group  Unknown









Author







 Organization  [facility]









Support







 Name  Relationship  Address  Phone

 

 LISA SCHROEDER, Unknown Name  Jane  Unknown Address  [phone]









Care Team Providers







 Name  Role  Phone

 

 Unavailable  Unavailable  Unavailable









Problems







 Condition  Condition  Condition  Status  Onset  Resolution  Last  Treating  
Comments



 Name  Details  Category    Date  Date  Treatment  Clinician  



             Date    

 

 Wernicke's  Wernicke's  Diagnosis  Active        Ruthy  



 encephalopa  encephalopa      3-23      Perez  



 thy  thy              

 

 Unspecified  Unspecified  Diagnosis  Active        Ruthy  



 cord  cord      3-      Perez  



 compression  compression              







Allergies, Adverse Reactions, Alerts







 Allergy Name  Allergy  Status  Severity  Reaction(s)  Onset  Inactive  
Treating  Comments



   Type        Date  Date  Clinician  

 

 citalopram  Unknown  Active  Unknown  Reaction      Tegan  



         Unknown  3-30    (Fanta)  



               Soo  



               [id number]  

 

 hydrochlorithia  Unknown  Active  Unknown  Reaction      Tegan  



 zide        Unknown  3-30    (Fanta)  



               Soo  



               [id number]  

 

 lisinopril  Unknown  Active  Unknown  Reaction      Tegan  



         Unknown  3-30    (Fanta)  



               Soo  



               [id number]  







Medications







 Ordered  Filled  Start  Stop  Current  Ordering  Indication  Dosage  Frequency
  Signature  Comments  Components



 Medication  Medication  Date  Date  Medication?  Clinician        (SIG)    



 Name  Name                    

 

 No Known  No Known      No  None    None  None      



 Medications  Medications                    



 For This  For This                    



 Patient  Patient                    







Procedures

This patient has no known procedures.



Results

This patient has no known results.

## 2020-04-23 NOTE — XMS REPORT
Patient Summary Document

 Created on:2020



Patient:HUMA GARCÍA Unknown

Sex:Male

:1955

External Reference #:[id number]





Demographics







 Address  [address] [place] [address]

 

 Home Phone  [phone]

 

 Preferred Language  unk

 

 Marital Status  Unknown

 

 Hindu Affiliation  Unknown

 

 Race  Unknown

 

 Ethnic Group  Unknown









Author







 Organization  [facility]









Support







 Name  Relationship  Address  Phone

 

 LISA SCHROEDER, Unknown Name  Jane  Unknown Address  [phone]









Care Team Providers







 Name  Role  Phone

 

 Unavailable  Unavailable  Unavailable









Problems







 Condition  Condition  Condition  Status  Onset  Resolution  Last  Treating  
Comments



 Name  Details  Category    Date  Date  Treatment  Clinician  



             Date    

 

 Wernicke's  Wernicke's  Diagnosis  Active        Ruthy  



 encephalopa  encephalopa      3-23      Perez  



 thy  thy              

 

 Unspecified  Unspecified  Diagnosis  Active        Ruthy  



 cord  cord      3-      Perez  



 compression  compression              







Allergies, Adverse Reactions, Alerts







 Allergy Name  Allergy  Status  Severity  Reaction(s)  Onset  Inactive  
Treating  Comments



   Type        Date  Date  Clinician  

 

 citalopram  Unknown  Active  Unknown  Reaction      Tegan  



         Unknown  3-30    (Fanta)  



               Soo  



               [id number]  

 

 hydrochlorithia  Unknown  Active  Unknown  Reaction      Tegan  



 zide        Unknown  3-30    (Fanta)  



               Soo  



               [id number]  

 

 lisinopril  Unknown  Active  Unknown  Reaction      Tegan  



         Unknown  3-30    (Fanta)  



               Soo  



               [id number]  







Medications







 Ordered  Filled  Start  Stop  Current  Ordering  Indication  Dosage  Frequency
  Signature  Comments  Components



 Medication  Medication  Date  Date  Medication?  Clinician        (SIG)    



 Name  Name                    

 

 No Known  No Known      No  None    None  None      



 Medications  Medications                    



 For This  For This                    



 Patient  Patient                    







Procedures

This patient has no known procedures.



Results

This patient has no known results.

## 2020-04-23 NOTE — XMS REPORT
Continuity of Care Document (CCD)

 Created on:2020



Patient:Tanner Fisher

Sex:Male

:1955

External Reference #:[id number]





Demographics







 Address  [address]



   [place] [address]

 

 Mobile Phone  [phone]

 

 Work Phone  [phone]

 

 Email Address  [email or web address]

 

 Preferred Language  en

 

 Marital Status  Not  or 

 

 Ep[employer]opalian Affiliation  Unknown

 

 Race  White

 

 Ethnic Group  Not  or 









Author







 Name  Gaurav Romero M.D.

 

 Address  [address]



   Unavailable



   [place] [address]









Care Team Providers







 Name  Role  Phone

 

 Sahil Jc MD -  Care Team Information   [phone]



 Otolaryngology    

 

 Joseph Busby MD - Urology  Care Team Information   [phone]

 

 Trell Grijalva MD - Orthopaedic  Care Team Information   [phone]
[phone]



 Surgery    

 

 Althea Rodriguez M.D. - Family Medicine  Care Team Information   [phone]
[phone]









Problems







 Active Problems  Provider  Date

 

 Mixed hyperlipidemia  Darwin Miranda M.D.,FACP  Onset: 2008

 

 Essential tremor  Darwin Miranda M.D.,FACP  Onset: 2008

 

 Essential hypertension  Ross Tejeda NP  Onset: 2016

 

 Alcohol-induced cerebellar ataxia  Darwin Miranda M.D.,FACP  Onset: 2018

 

 Chronic alcoholism in remission  Darwin Miranda M.D.,FACP  Onset: 2018

 

 Ex-smoker  Darwin Miranda M.D.,FACP  Onset: 2018

 

 Wernicke's disease  Saman May M.D.  Onset: 2018

 

 Spinal cord injury without spinal bone  Saman May M.D.  Onset: 2018



 injury    

 

 Static encephalopathy  Saman May M.D.  Onset: 2018

 

 Late effect of intracranial injury  Saman May M.D.  Onset: 2018



 without skull fracture    

 

 Idiopathic peripheral neuropathy  Saman May M.D.  Onset: 2018

 

 Nondependent alcohol abuse in  Saman May M.D.  Onset: 2018



 remission    

 

 Cervical spondylosis with myelopathy  Han Kingsley M.D.  Onset: 2018







Social History







 Type  Date  Description  Comments

 

 Birth Sex    Unknown  

 

 Tobacco Use  Start: Unknown End:  Former Cigarette Smoker  started age 24, quit



       age 54 for 8 years,



       then smoked again



       for one year total



       31 years 1 PPD

 

 Smoking Status  Reviewed: 19  Former Cigarette Smoker  started age 24, 
quit



       age 54 for 8 years,



       then smoked again



       for one year total



       31 years 1 PPD

 

 ETOH Use    Denies alcohol use  

 

 ETOH Use    consumes 5-6 beers per  



     week  

 

 Recreational Drug Use    Denies Drug Use  

 

 Tobacco Use  Start: Unknown End:  Patient is a former  quit 2009



   Unknown  smoker  







Allergies, Adverse Reactions, Alerts







 Active Allergies  Reaction  Severity  Comments  Date

 

 Celexa      worse depression  2008

 

 Lisinopril      cough  2008

 

 HCTZ      hyperglycemia  2008







Medications







 Active Medications  SIG  Qnty  Indications  Ordering  Date



         Provider  

 

 Shingrix  0.5 milliliters  1units  Z00.01  Althea Rodriguez MD  2019



         50mcg/0.5ML  intramuscular now        



 Suspension Rec  and 1-2 months later        



   repeat        

 

 Bupropion  Take 1 Tablet By  90tabs    Rosetta Conway,  2019



 Hydrochloride ER  Mouth Every Day      M.DOdalis  



 (SR)          



     100mg Tablets ER          



 12HR          



           

 

 Metoprolol Succinate  take 1 tablet by  90tabs  I10  Roxann Nolasco,  2016



 ER  mouth every day      M.DOdalis, FACP  



   50mg Tablets ER          



 24HR          



           

 

 Simvastatin  Take 1 Tablet By  90tabs    Cherelle  2013



            20mg  Mouth Every Day In      AMADOR Dennis  



 Tablets  The Evening        



           

 

 Omeprazole  take 1 capsule by  90caps    Althea Rodriguez MD  2011



           20mg  mouth every day        



 Capsules DR Avitia  take 1 tablet by  90tabs  I10  Cherelle  2011



       160mg Tablets  mouth every day      AMADOR Dennis  



           

 

 Fish Oil + D3  1 capsule daily      Unknown  



           



 1200-1000mg-Unit          



 Capsules          



           

 

 Saw Palmetto  1 po hs  120caps    Unknown  



             450mg          



 Capsules          



           

 

 Centrum Silver Ultra  1 po qd      Unknown  



 Mens          



      Tablets          



           

 

 Ascorbic Acid  1 by mouth every day      Unknown  



              500mg          



 Tablets          



           

 

 Magnesium Oxide  2 tab by mouth every      Unknown  



                400mg  day        



 Tablets          



           







Immunizations







 CPT Code  Status  Date  Vaccine  Reaction  Lot #

 

 [id number]  Given  2019  Pneumococcal Conjugate    [id number]



       Vaccine 13 Valent For    



       Intramuscular Use    

 

 99931  Given  01/15/2018  Influenza Virus Vaccine,  no immedite reaction  [id number]



       Quadrivalent, Split,  noted .. hh  



       Preservative Free    

 

 04014  Given  2016  Zoster (Zostavax)    [id number]

 

 67369  Given  2016  Influenza Virus Vaccine,    [id number]



       Quadrivalent, Split,    



       Preservative Free    

 

 38150  Given  2016  Tdap -    [id number]



       Tetanus/Diptheria/Acellular    



       Pertussis    

 

   Given  2012  Fluzone Vaccine    [id number]

 

 01109  Given  2011  Influenza Virus 3Yrs & Over    [id number]







Vital Signs







 Date  Vital  Result  Comment

 

 2019 11:20am  Height  65 inches  5'5"









 Weight  155.00 lb  shoes

 

 Heart Rate  76 /min  

 

 BP Systolic  148 mmHg  

 

 BP Diastolic  89 mmHg  

 

 Body Temperature  98.6 F  

 

 O2 % BldC Oximetry  98 %  

 

 BMI (Body Mass Index)  25.8 kg/m2  









 2018  9:06am  Height  65 inches  5'5"









 Weight  150.25 lb  

 

 Heart Rate  74 /min  

 

 BP Systolic Sitting  128 mmHg  

 

 BP Diastolic Sitting  72 mmHg  

 

 Pain Level  0  

 

 BMI (Body Mass Index)  25.0 kg/m2  







Results







 Test  Acquired Date  Facility  Test  Result  H/L  Range  Note

 

 Laboratory test  2020  [facility]  Ammonia  49 mcmol/L  
Normal  16-53  



 finding    [address] [place] [address] [phone]          









 Vitamin B1 (Whole Blood)  127 nmol/L      1









 Basic Metabolic  2020  [facility]  Sodium  137 mmol/L  Normal
  135-145  



 Panel    [address] [place] [address] [phone]          









 Potassium  4.1 mmol/L  Normal  3.5-5.0  

 

 Chloride  104 mmol/L  Normal  101-111  

 

 Co2 Carbon Dioxide  20 mmol/L  Low  22-32  

 

 Anion Gap  13 mmol/L  High  2-11  

 

 Glucose  83 mg/dL  Normal    

 

 Blood Urea Nitrogen  12 mg/dL  Normal  6-24  

 

 Creatinine  0.68 mg/dL  Normal  0.67-1.17  

 

 BUN/Creatinine Ratio  17.6  Normal  8-20  

 

 Calcium  7.9 mg/dL  Low  8.6-10.3  

 

 Egfr Non-  117.0    >60  

 

 Egfr   141.6    >60  2









 CBC Auto  2020  [facility]  White Blood  11.4 10^3/uL  High  
3.5-10.8  



 Diff    [address]  Count        



     [place] [address] [phone]          









 Red Blood Count  3.89 10^6/uL  Low  4.18-5.48  

 

 Hemoglobin  13.5 g/dL  Low  14.0-18.0  

 

 Hematocrit  40 %  Low  42-52  

 

 Mean Corpuscular Volume  102 fL  High  80-94  

 

 Mean Corpuscular Hemoglobin  35 pg  High  27-31  

 

 Mean Corpuscular HGB Conc  34 g/dL  Normal  31-36  

 

 Red Cell Distribution Width  14 %  Normal  10-15  

 

 Platelet Count  257 10^3/uL  Normal  150-450  

 

 Mean Platelet Volume  6.7 fL  Low  7.4-10.4  

 

 Abs Neutrophils  10.4 10^3/uL  High  1.5-7.7  

 

 Abs Lymphocytes  0.5 10^3/uL  Low  1.0-4.8  

 

 Abs Monocytes  0.5 10^3/uL  Normal  0-0.8  

 

 Abs Eosinophils  0.0 10^3/uL  Normal  0-0.6  

 

 Abs Basophils  0.0 10^3/uL  Normal  0-0.2  

 

 Abs Nucleated RBC  0.0 10^3/uL      

 

 Granulocyte %  90.9 %      

 

 Lymphocyte %  4.4 %      

 

 Monocyte %  4.5 %      

 

 Eosinophil %  0.0 %      

 

 Basophil %  0.2 %      

 

 Nucleated Red Blood Cells %  0.0      









 Comp Metabolic  2020  [facility]  Sodium  138 mmol/L  Normal  
135-145  



 Panel    [address]          



     [place] [address] [phone]          









 Potassium  3.9 mmol/L  Normal  3.5-5.0  

 

 Chloride  99 mmol/L  Low  101-111  

 

 Co2 Carbon Dioxide  20 mmol/L  Low  22-32  

 

 Anion Gap  19 mmol/L  High  2-11  

 

 Glucose  127 mg/dL  High    

 

 Blood Urea Nitrogen  12 mg/dL  Normal  6-24  

 

 Creatinine  0.76 mg/dL  Normal  0.67-1.17  

 

 BUN/Creatinine Ratio  15.8  Normal  8-20  

 

 Calcium  8.7 mg/dL  Normal  8.6-10.3  

 

 Total Protein  6.8 g/dL  Normal  6.4-8.9  

 

 Albumin  3.7 g/dL  Normal  3.2-5.2  

 

 Globulin  3.1 g/dL  Normal  2-4  

 

 Albumin/Globulin Ratio  1.2  Normal  1-3  

 

 Total Bilirubin  0.90 mg/dL  Normal  0.2-1.0  

 

 Alkaline Phosphatase  105 U/L  High    

 

 Alt  35 U/L  Normal  7-52  

 

 Ast  59 U/L  High  13-39  

 

 Egfr Non-  102.9    >60  

 

 Egfr   124.6    >60  3









 Laboratory test  2020  [facility]  Alcohol  < 10 mg/dL  
Normal  <10  



 finding    [address] [place] [address] [phone]          









 TSH (Thyroid Stim Horm)  0.54 mcIU/mL  Normal  0.34-5.60  

 

 Acetaminophen  < 15 g/mL      4

 

 Salicylate  < 2.50 mg/dL    <30  

 

 Osmolality Serum  294 mOsm/kg  Normal  275-295  









 1  -------------------ADDITIONAL INFORMATION-------------------



   This test was developed and its performance characteristics



   determined by [facility] in a manner consistent with CLIA



   requirements. This test has not been cleared or approved by



   the U.S. Food and Drug Administration.



   Test Performed by:



   [facility]



   [address] [place] [address]



   : Sancho Sifuentes M.D. Ph.D.; CLIA# [id number]

 

 2  *******Because ethnic data is not always readily available,



   this report includes an eGFR for both -Americans and



   non- Americans.****



   The National Kidney Disease Education Program (NKDEP) does



   not endorse the use of the MDRD equation for patients that



   are not between the ages of 18 and 70, are pregnant, have



   extremes of body size, muscle mass, or nutritional status,



   or are non- or non-.



   According to the National Kidney Foundation, irrespective of



   diagnosis, the stage of the disease is based on the level of



   kidney function:



   Stage Description                      GFR(mL/min/1.73 m(2))



   1     Kidney damage with normal or decreased GFR       90



   2     Kidney damage with mild decrease in GFR          60-89



   3     Moderate decrease in GFR                         30-59



   4     Severe decrease in GFR                           15-29



   5     Kidney failure                       <15 (or dialysis)

 

 3  *******Because ethnic data is not always readily available,



   this report includes an eGFR for both -Americans and



   non- Americans.****



   The National Kidney Disease Education Program (NKDEP) does



   not endorse the use of the MDRD equation for patients that



   are not between the ages of 18 and 70, are pregnant, have



   extremes of body size, muscle mass, or nutritional status,



   or are non- or non-.



   According to the National Kidney Foundation, irrespective of



   diagnosis, the stage of the disease is based on the level of



   kidney function:



   Stage Description                      GFR(mL/min/1.73 m(2))



   1     Kidney damage with normal or decreased GFR       90



   2     Kidney damage with mild decrease in GFR          60-89



   3     Moderate decrease in GFR                         30-59



   4     Severe decrease in GFR                           15-29



   5     Kidney failure                       <15 (or dialysis)

 

 4  Therapeutic concentration: <50 ug/mL



   Toxic concentration: >120 ug/mL







Procedures







 Date  Code  Description  Status

 

 2020  87270  ECHO Transthorasic Realtime 2D W Doppler & Color Flow  
Completed



     Hosp  

 

 2018  616358847  Diabetic Retinal Eye Exam  Completed

 

 2017  24331332  Colonoscopy  Completed

 

 2006  43143812  Colonoscopy  Completed







[employer] Devices







 Description

 

 No Information Available







Encounters







 Type  Date  Location  Provider  Dx  Diagnosis

 

 Office Visit  2020  [facility]  Karey Garrido,  M48.02  Spinal 
stenosis,



   2:06p  yogi Walsh DO    cervical region



     Hospitalists      









 R26.0  Ataxic gait









 Office Visit  2020  2:06p  [facility]  Deb  M48.02  Spinal



     Assoc,yogi Lerner PA-C    stenosis,



     Hospitalists      cervical region









 R26.0  Ataxic gait

 

 E83.42  Hypomagnesemia









 [facility]  [facility]  M48.02  Spinal



   2:05p  yogi Walsh M.D.    stenosis,



     Hospitalists      cervical region









 M50.021  Cervical disc disorder at C4-C5 level with myelopathy

 

 R26.0  Ataxic gait

 

 I10  Essential (primary) hypertension









 [facility]  2020  [facility]  M48.02  Spinal



   2:05p  yogi Walsh M.D.    stenosis,



     Hospitalists      cervical region









 R26.0  Ataxic gait

 

 F10.21  Alcohol dependence, in remission

 

 I10  Essential (primary) hypertension









 Office Visit  03/15/2020  2:04p  [facility]  [place]  R26.0  Ataxic gait



     Assoc,yogi Gann M.D.    



     Hospitalists      









 R53.1  Weakness

 

 R25.1  Tremor, unspecified

 

 I10  Essential (primary) hypertension









 Office Visit  2020  2:03p  [facility]  Gabby Worthington,  R29.6  Repeated 
falls



     Assoc,pc  N.P.    



     Hospitalists      









 R26.0  Ataxic gait







Assessments







 Date  Code  Description  Provider

 

 2020  M48.02  Spinal stenosis, cervical region  Gaurav Romero M.D.

 

 2020  I10  Essential (primary) hypertension  Gaurav Romero M.D.

 

 2020  F10.21  Alcohol dependence, in remission  Gaurav Romero M.D.

 

 2020  M48.02  Spinal stenosis, cervical region  Karey Senner, DO

 

 2020  M50.021  Cervical disc disorder at C4-C5 level  Karey Jeetner, DO



     with myelopathy  

 

 2020  R26.0  Ataxic gait  Karey Senner, DO

 

 2020  R29.6  Repeated falls  Karey Senner, DO

 

 2020  M48.02  Spinal stenosis, cervical region  Karey Senner, DO

 

 2020  R26.0  Ataxic gait  Karey Senner, DO

 

 2020  M48.02  Spinal stenosis, cervical region  Deb Lerner PA-C

 

 2020  I10  Essential (primary) hypertension  Cailin Marks M.D.

 

 2020  R26.0  Ataxic gait  Deb Lerner PA-C

 

 2020  E83.42  Hypomagnesemia  Deb Lerner PA-C

 

 2020  M48.02  Spinal stenosis, cervical region  Lennox Gann M.D.

 

 2020  M50.021  Cervical disc disorder at C4-C5 level  Lennox Gann M.D.



     with myelopathy  

 

 2020  R26.0  Ataxic gait  Lennox Gann M.D.

 

 2020  I10  Essential (primary) hypertension  Lennox Gann M.D.

 

 2020  M48.02  Spinal stenosis, cervical region  Lennox Gann M.D.

 

 2020  R26.0  Ataxic gait  Lennox Gann M.D.

 

 2020  F10.21  Alcohol dependence, in remission  Lennox Gann M.D.

 

 2020  I10  Essential (primary) hypertension  Lennox Gann M.D.

 

 03/15/2020  R26.0  Ataxic gait  Lennox Gann M.D.

 

 03/15/2020  R53.1  Weakness  Lennox Gann M.D.

 

 03/15/2020  R25.1  Tremor, unspecified  Lennox Gann M.D.

 

 03/15/2020  I10  Essential (primary) hypertension  Lennox Gann M.D.

 

 2020  R29.6  Repeated falls  Gabby Worthington, N.P.

 

 2020  R26.0  Ataxic gait  Gabby Worthington, N.P.







Plan of Treatment

No Information Available



Functional Status







 Description

 

 No Information Available







Mental Status







 Description

 

 No Information Available







Referrals







 Description

 

 No Information Available

## 2020-04-23 NOTE — XMS REPORT
Patient Summary Document

 Created on:2020



Patient:HUMA GARCÍA Unknown

Sex:Male

:1955

External Reference #:[id number]





Demographics







 Address  [address] [place] [address]

 

 Home Phone  [phone]

 

 Preferred Language  unk

 

 Marital Status  Unknown

 

 Anabaptism Affiliation  Unknown

 

 Race  Unknown

 

 Ethnic Group  Unknown









Author







 Organization  [facility]









Support







 Name  Relationship  Address  Phone

 

 LISA SCHROEDER, Unknown Name  Jane  Unknown Address  [phone]









Care Team Providers







 Name  Role  Phone

 

 Unavailable  Unavailable  Unavailable









Problems







 Condition  Condition  Condition  Status  Onset  Resolution  Last  Treating  
Comments



 Name  Details  Category    Date  Date  Treatment  Clinician  



             Date    

 

 Wernicke's  Wernicke's  Diagnosis  Active        Magan  



 encephalopa  encephalopa      3-23      Anguish  



 thy  thy            [id number]  

 

 Unspecified  Unspecified  Diagnosis  Active        Magan  



 cord  cord      3-23      Anguish  



 compression  compression            [id number]  

 

 Neuro  anxiety  Neuro/Emot  Active        Tegan  



   present  ion    3-24      (Fanta)  



         09:30:      Vann  



         00      [id number]  

 

 Neuro  depressive  Neuro/Emot  Active        Tegan  



   feelings  ion    3-24      (Fanta)  



   present      09:30:      Vann  



         00      [id number]  







Allergies, Adverse Reactions, Alerts







 Allergy Name  Allergy  Status  Severity  Reaction(s)  Onset  Inactive  
Treating  Comments



   Type        Date  Date  Clinician  

 

 citalopram  Unknown  Active  Unknown  Reaction      Tegan  



         Unknown  3-30    (Fanta)  



               Soo  



               [id number]  

 

 hydrochlorithia  Unknown  Active  Unknown  Reaction      Tegan  



 zide        Unknown  3-30    (Fanta)  



               Soo  



               [id number]  

 

 lisinopril  Unknown  Active  Unknown  Reaction      Tegan  



         Unknown  3-30    (Fanta)  



               Soo  



               [id number]  







Medications







 Ordered  Filled  Start  Stop  Current  Ordering  Indication  Dosage  Frequency
  Signature  Comments  Components



 Medication  Medication  Date  Date  Medication?  Clinician        (SIG)    



 Name  Name                    

 

 multivitami  multivitami      Yes  John    Unknown  Unknown      



 n capsule  n capsule  3-24      Apryl RODRIGUEZ            

 

 valsartan  valsartan  -0    Yes  John    Unknown  Unknown      



 160 mg  160 mg  3-24      Apryl RODRIGUEZ            



 tablet  tablet                    

 

 buPROPion  buPROPion  -0    Yes  John    Unknown  Unknown      



 HCl   HCl   3-24      Apryl RODRIGUEZ            



 mg  mg                    



 tablet,12  tablet,12                    



 hr  hr                    



 sustained-r  sustained-r                    



 elease  elease                    

 

 ascorbic  ascorbic  -0    Yes  John    Unknown  Unknown      



 acid  acid  3-24      Apryl RODRIGUEZ            



 (vitamin C)  (vitamin C)                    



 500 mg  500 mg                    



 capsule  capsule                    

 

 omeprazole  omeprazole  -0    Yes  John    Unknown  Unknown      



 20 mg  20 mg  3-24      MD,Apryl            



 tablet,marlena  tablet,marlena                    



 yed release  yed release                    

 

 omega  omega  2020-0    Yes  John    Unknown  Unknown      



 3-dha-epa-f  3-dha-epa-f  3-24      MD,Apryl            



 rosaura oil  rosaura oil                    



 1,000 mg  1,000 mg                    



 (120 mg-180  (120 mg-180                    



 mg) capsule  mg) capsule                    

 

 metoprolol  metoprolol  -0    Yes  John    Unknown  Unknown      



 succinate  succinate  3-24      MD,Apryl            



 ER 50 mg  ER 50 mg                    



 tablet,exte  tablet,exte                    



 nded  nded                    



 release 24  release 24                    



 hr  hr                    

 

 simvastatin  simvastatin  -0    Yes  John    Unknown  Unknown      



 20 mg  20 mg  3-24      MD,Apryl            



 tablet  tablet                    







Vital Signs







 Vital Name  Observation Time  Observation Value  Comments

 

 SYSTOLIC mm[Hg]  2020 18:11:04  128 mm[Hg] mm[Hg]  Method: Sit

 

 DIASTOLIC mm[Hg]  2020 18:11:04  64 mm[Hg] mm[Hg]  Method: Sit

 

 PULSE  2020 18:11:04  87 /min /min  

 

 RESP RATE  2020 18:11:04  15 /min /min  

 

 TEMP  2020 18:11:04  98.9 [degF]  







Procedures

This patient has no known procedures.



Results

This patient has no known results.

## 2020-04-23 NOTE — XMS REPORT
Continuity of Care Document (CCD)

 Created on:2020



Patient:Tanner Fisher

Sex:Male

:1955

External Reference #:[id number]





Demographics







 Address  [address]



   [place] [address]

 

 Mobile Phone  [phone]

 

 Work Phone  [phone]

 

 Email Address  [email or web address]

 

 Preferred Language  en

 

 Marital Status  Not  or 

 

 Jew Affiliation  Unknown

 

 Race  White

 

 Ethnic Group  Not  or 









Author







 Name  Lennox Gann M.D. (transmitted by agent of provider Tamara Cedeno)

 

 Address  [address]



   Unavailable



   [place] [address]









Care Team Providers







 Name  Role  Phone

 

 Sahil Jc MD -  Care Team Information   [phone]



 Otolaryngology    

 

 Joseph Busby MD - Urology  Care Team Information   [phone]

 

 Trell Grijalva MD - Orthopaedic  Care Team Information   [phone]
[phone]



 Surgery    

 

 Althea Rodriguez M.D. - Family Medicine  Care Team Information   [phone]
[phone]









Problems







 Active Problems  Provider  Date

 

 Mixed hyperlipidemia  Darwin Miranda M.D.,FACP  Onset: 2008

 

 Essential tremor  Darwin Miranda M.D.,FACP  Onset: 2008

 

 Essential hypertension  Ross Tejeda NP  Onset: 2016

 

 Alcohol-induced cerebellar ataxia  Darwin Miranda M.D.,FACP  Onset: 2018

 

 Chronic alcoholism in remission  Darwin Miranda M.D.,FACP  Onset: 2018

 

 Ex-smoker  Darwin Miranda M.D.,FACP  Onset: 2018

 

 Wernicke's disease  Saman May M.D.  Onset: 2018

 

 Spinal cord injury without spinal bone  Saman May M.D.  Onset: 2018



 injury    

 

 Static encephalopathy  Saman May M.D.  Onset: 2018

 

 Late effect of intracranial injury  Saman May M.D.  Onset: 2018



 without skull fracture    

 

 Idiopathic peripheral neuropathy  Saman May M.D.  Onset: 2018

 

 Nondependent alcohol abuse in  Saman May M.D.  Onset: 2018



 remission    

 

 Cervical spondylosis with myelopathy  Han Kingsley M.D.  Onset: 2018







Social History







 Type  Date  Description  Comments

 

 Birth Sex    Unknown  

 

 Tobacco Use  Start: Unknown End:  Former Cigarette Smoker  started age 24, quit



       age 54 for 8 years,



       then smoked again



       for one year total



       31 years 1 PPD

 

 Smoking Status  Reviewed: 19  Former Cigarette Smoker  started age 24, 
quit



       age 54 for 8 years,



       then smoked again



       for one year total



       31 years 1 PPD

 

 ETOH Use    Denies alcohol use  

 

 ETOH Use    consumes 5-6 beers per  



     week  

 

 Recreational Drug Use    Denies Drug Use  

 

 Tobacco Use  Start: Unknown End:  Patient is a former  quit 2009



   Unknown  smoker  







Allergies, Adverse Reactions, Alerts







 Active Allergies  Reaction  Severity  Comments  Date

 

 Celexa      worse depression  2008

 

 Lisinopril      cough  2008

 

 HCTZ      hyperglycemia  2008







Medications







 Active Medications  SIG  Qnty  Indications  Ordering  Date



         Provider  

 

 Shingrix  0.5 milliliters  1units  Z00.01  Althea Rodriguez MD  2019



         50mcg/0.5ML  intramuscular now        



 Suspension Rec  and 1-2 months later        



   repeat        

 

 Bupropion  Take 1 Tablet By  90tabs    Rosetta Conway,  2019



 Hydrochloride ER  Mouth Every Day      M.DOdalis  



 (SR)          



     100mg Tablets ER          



 12HR          



           

 

 Metoprolol Succinate  take 1 tablet by  90tabs  I10  Roxann Nolasco,  2016



 ER  mouth every day      M.DOdalis, FACP  



   50mg Tablets ER          



 24HR          



           

 

 Simvastatin  Take 1 Tablet By  90tabs    Cherelle  2013



            20mg  Mouth Every Day In      AMADOR Dennis  



 Tablets  The Evening        



           

 

 Omeprazole  take 1 capsule by  90caps    Althea Rodriguez MD  2011



           20mg  mouth every day        



 Capsules DR Avitia  take 1 tablet by  90tabs  I10  Cherelle  2011



       160mg Tablets  mouth every day      AMADOR Dennis  



           

 

 Fish Oil + D3  1 capsule daily      Unknown  



           



 1200-1000mg-Unit          



 Capsules          



           

 

 Saw Palmetto  1 po hs  120caps    Unknown  



             450mg          



 Capsules          



           

 

 Centrum Silver Ultra  1 po qd      Unknown  



 Mens          



      Tablets          



           

 

 Ascorbic Acid  1 by mouth every day      Unknown  



              500mg          



 Tablets          



           

 

 Magnesium Oxide  2 tab by mouth every      Unknown  



                400mg  day        



 Tablets          



           







Immunizations







 CPT Code  Status  Date  Vaccine  Reaction  Lot #

 

 [id number]  Given  2019  Pneumococcal Conjugate    [id number]



       Vaccine 13 Valent For    



       Intramuscular Use    

 

 66269  Given  01/15/2018  Influenza Virus Vaccine,  no immedite reaction  [id number]



       Quadrivalent, Split,  noted .. hh  



       Preservative Free    

 

 46220  Given  2016  Zoster (Zostavax)    [id number]

 

 44075  Given  2016  Influenza Virus Vaccine,    [id number]



       Quadrivalent, Split,    



       Preservative Free    

 

 39352  Given  2016  Tdap -    [id number]



       Tetanus/Diptheria/Acellular    



       Pertussis    

 

   Given  2012  Fluzone Vaccine    [id number]

 

 34194  Given  2011  Influenza Virus 3Yrs & Over    [id number]







Vital Signs







 Date  Vital  Result  Comment

 

 2019 11:20am  Height  65 inches  5'5"









 Weight  155.00 lb  shoes

 

 Heart Rate  76 /min  

 

 BP Systolic  148 mmHg  

 

 BP Diastolic  89 mmHg  

 

 Body Temperature  98.6 F  

 

 O2 % BldC Oximetry  98 %  

 

 BMI (Body Mass Index)  25.8 kg/m2  









 2018  9:06am  Height  65 inches  5'5"









 Weight  150.25 lb  

 

 Heart Rate  74 /min  

 

 BP Systolic Sitting  128 mmHg  

 

 BP Diastolic Sitting  72 mmHg  

 

 Pain Level  0  

 

 BMI (Body Mass Index)  25.0 kg/m2  







Results







 Test  Acquired Date  Facility  Test  Result  H/L  Range  Note

 

 Laboratory test  2020  [facility]  Ammonia  49 mcmol/L  
Normal  16-53  



 finding    [address] [place] [address] [phone]          









 Vitamin B1 (Whole Blood)  127 nmol/L      1









 Basic Metabolic  2020  [facility]  Sodium  137 mmol/L  Normal
  135-145  



 Panel    [address] [place] [address] [phone]          









 Potassium  4.1 mmol/L  Normal  3.5-5.0  

 

 Chloride  104 mmol/L  Normal  101-111  

 

 Co2 Carbon Dioxide  20 mmol/L  Low  22-32  

 

 Anion Gap  13 mmol/L  High  2-11  

 

 Glucose  83 mg/dL  Normal    

 

 Blood Urea Nitrogen  12 mg/dL  Normal  6-24  

 

 Creatinine  0.68 mg/dL  Normal  0.67-1.17  

 

 BUN/Creatinine Ratio  17.6  Normal  8-20  

 

 Calcium  7.9 mg/dL  Low  8.6-10.3  

 

 Egfr Non-  117.0    >60  

 

 Egfr   141.6    >60  2









 CBC Auto  2020  [facility]  White Blood  11.4 10^3/uL  High  
3.5-10.8  



 Diff    [address]  Count        



     [place] [address] [phone]          









 Red Blood Count  3.89 10^6/uL  Low  4.18-5.48  

 

 Hemoglobin  13.5 g/dL  Low  14.0-18.0  

 

 Hematocrit  40 %  Low  42-52  

 

 Mean Corpuscular Volume  102 fL  High  80-94  

 

 Mean Corpuscular Hemoglobin  35 pg  High  27-31  

 

 Mean Corpuscular HGB Conc  34 g/dL  Normal  31-36  

 

 Red Cell Distribution Width  14 %  Normal  10-15  

 

 Platelet Count  257 10^3/uL  Normal  150-450  

 

 Mean Platelet Volume  6.7 fL  Low  7.4-10.4  

 

 Abs Neutrophils  10.4 10^3/uL  High  1.5-7.7  

 

 Abs Lymphocytes  0.5 10^3/uL  Low  1.0-4.8  

 

 Abs Monocytes  0.5 10^3/uL  Normal  0-0.8  

 

 Abs Eosinophils  0.0 10^3/uL  Normal  0-0.6  

 

 Abs Basophils  0.0 10^3/uL  Normal  0-0.2  

 

 Abs Nucleated RBC  0.0 10^3/uL      

 

 Granulocyte %  90.9 %      

 

 Lymphocyte %  4.4 %      

 

 Monocyte %  4.5 %      

 

 Eosinophil %  0.0 %      

 

 Basophil %  0.2 %      

 

 Nucleated Red Blood Cells %  0.0      









 Comp Metabolic  2020  [facility]  Sodium  138 mmol/L  Normal  
135-145  



 Panel    [address]          



     [place] [address] [phone]          









 Potassium  3.9 mmol/L  Normal  3.5-5.0  

 

 Chloride  99 mmol/L  Low  101-111  

 

 Co2 Carbon Dioxide  20 mmol/L  Low  22-32  

 

 Anion Gap  19 mmol/L  High  2-11  

 

 Glucose  127 mg/dL  High    

 

 Blood Urea Nitrogen  12 mg/dL  Normal  6-24  

 

 Creatinine  0.76 mg/dL  Normal  0.67-1.17  

 

 BUN/Creatinine Ratio  15.8  Normal  8-20  

 

 Calcium  8.7 mg/dL  Normal  8.6-10.3  

 

 Total Protein  6.8 g/dL  Normal  6.4-8.9  

 

 Albumin  3.7 g/dL  Normal  3.2-5.2  

 

 Globulin  3.1 g/dL  Normal  2-4  

 

 Albumin/Globulin Ratio  1.2  Normal  1-3  

 

 Total Bilirubin  0.90 mg/dL  Normal  0.2-1.0  

 

 Alkaline Phosphatase  105 U/L  High    

 

 Alt  35 U/L  Normal  7-52  

 

 Ast  59 U/L  High  13-39  

 

 Egfr Non-  102.9    >60  

 

 Egfr   124.6    >60  3









 Laboratory test  2020  [facility]  Alcohol  < 10 mg/dL  
Normal  <10  



 finding    [address] [place] [address] [phone]          









 TSH (Thyroid Stim Horm)  0.54 mcIU/mL  Normal  0.34-5.60  

 

 Acetaminophen  < 15 g/mL      4

 

 Salicylate  < 2.50 mg/dL    <30  

 

 Osmolality Serum  294 mOsm/kg  Normal  275-295  









 1  -------------------ADDITIONAL INFORMATION-------------------



   This test was developed and its performance characteristics



   determined by [facility] in a manner consistent with CLIA



   requirements. This test has not been cleared or approved by



   the U.S. Food and Drug Administration.



   Test Performed by:



   [facility]



   [address] [place] [address]



   : Sancho Sifuentes M.D. Ph.D.; CLIA# [id number]

 

 2  *******Because ethnic data is not always readily available,



   this report includes an eGFR for both -Americans and



   non- Americans.****



   The National Kidney Disease Education Program (NKDEP) does



   not endorse the use of the MDRD equation for patients that



   are not between the ages of 18 and 70, are pregnant, have



   extremes of body size, muscle mass, or nutritional status,



   or are non- or non-.



   According to the National Kidney Foundation, irrespective of



   diagnosis, the stage of the disease is based on the level of



   kidney function:



   Stage Description                      GFR(mL/min/1.73 m(2))



   1     Kidney damage with normal or decreased GFR       90



   2     Kidney damage with mild decrease in GFR          60-89



   3     Moderate decrease in GFR                         30-59



   4     Severe decrease in GFR                           15-29



   5     Kidney failure                       <15 (or dialysis)

 

 3  *******Because ethnic data is not always readily available,



   this report includes an eGFR for both -Americans and



   non- Americans.****



   The National Kidney Disease Education Program (NKDEP) does



   not endorse the use of the MDRD equation for patients that



   are not between the ages of 18 and 70, are pregnant, have



   extremes of body size, muscle mass, or nutritional status,



   or are non- or non-.



   According to the National Kidney Foundation, irrespective of



   diagnosis, the stage of the disease is based on the level of



   kidney function:



   Stage Description                      GFR(mL/min/1.73 m(2))



   1     Kidney damage with normal or decreased GFR       90



   2     Kidney damage with mild decrease in GFR          60-89



   3     Moderate decrease in GFR                         30-59



   4     Severe decrease in GFR                           15-29



   5     Kidney failure                       <15 (or dialysis)

 

 4  Therapeutic concentration: <50 ug/mL



   Toxic concentration: >120 ug/mL







Procedures







 Date  Code  Description  Status

 

 2020  95138  ECHO Transthorasic Realtime 2D W Doppler & Color Flow  
Completed



     Hosp  

 

 2018  [id number]  Diabetic Retinal Eye Exam  Completed

 

 2017  44756708  Colonoscopy  Completed

 

 2006  05640467  Colonoscopy  Completed







Medical Devices







 Description

 

 No Information Available







Encounters







 Type  Date  Location  Provider  Dx  Diagnosis

 

 Office Visit  2020  [facility]  Karey Garrido,  M48.02  Spinal 
stenosis,



   2:06p  yogi Walsh DO    cervical region



     Hospitalists      









 R26.0  Ataxic gait









 Office Visit  2020  2:06p  [facility]  Deb  M48.02  Spinal



     Assocyogi PA-C    stenosis,



     Hospitalists      cervical region









 R26.0  Ataxic gait

 

 E83.42  Hypomagnesemia









 Office Visit  2020  [facility]  M48.02  Spinal



   2:05p  yogi Walsh M.D.    stenosis,



     Hospitalists      cervical region









 M50.021  Cervical disc disorder at C4-C5 level with myelopathy

 

 R26.0  Ataxic gait

 

 I10  Essential (primary) hypertension









 Office Visit  2020  [facility]  M48.02  Spinal



   2:05p  Assoc,yogi Gann M.D.    stenosis,



     Hospitalists      cervical region









 R26.0  Ataxic gait

 

 F10.21  Alcohol dependence, in remission

 

 I10  Essential (primary) hypertension









 Office Visit  03/15/2020  2:04p  [facility]  Lennox  R26.0  Ataxic gait



     Assoc,yogi Gann M.D.    



     Hospitalists      









 R53.1  Weakness

 

 R25.1  Tremor, unspecified

 

 I10  Essential (primary) hypertension









 Office Visit  2020  2:03p  [facility]  Gabby Ander,  R29.6  Repeated 
falls



     Assoc,pc  N.P.    



     Hospitalists      









 R26.0  Ataxic gait







Assessments







 Date  Code  Description  Provider

 

 2020  M48.02  Spinal stenosis, cervical region  Karey Senner, DO

 

 2020  M50.021  Cervical disc disorder at C4-C5 level  Karey Garrido, DO



     with myelopathy  

 

 2020  R26.0  Ataxic gait  Karey Senner, DO

 

 2020  R29.6  Repeated falls  Karey Senner, DO

 

 2020  M48.02  Spinal stenosis, cervical region  Karey Senner, DO

 

 2020  R26.0  Ataxic gait  Karey Senner, DO

 

 2020  M48.02  Spinal stenosis, cervical region  Deb Lerner PA-C

 

 2020  I10  Essential (primary) hypertension  Cailin Marks M.D.

 

 2020  R26.0  Ataxic gait  Deb Lerner PA-C

 

 2020  E83.42  Hypomagnesemia  Deb Lerner PA-C

 

 2020  M48.02  Spinal stenosis, cervical region  Lennox Gann M.D.

 

 2020  M50.021  Cervical disc disorder at C4-C5 level  Lennox Gann M.D.



     with myelopathy  

 

 2020  R26.0  Ataxic gait  Lennox Gann M.D.

 

 2020  I10  Essential (primary) hypertension  Lennox Gann M.D.

 

 2020  M48.02  Spinal stenosis, cervical region  Lennox Gann M.D.

 

 2020  R26.0  Ataxic gait  Lennox Gann M.D.

 

 2020  F10.21  Alcohol dependence, in remission  Lennox Gann M.D.

 

 2020  I10  Essential (primary) hypertension  Lennox Gann M.D.

 

 03/15/2020  R26.0  Ataxic gait  Lennox Gann M.D.

 

 03/15/2020  R53.1  Weakness  Lennox Gann M.D.

 

 03/15/2020  R25.1  Tremor, unspecified  Lennox Gann M.D.

 

 03/15/2020  I10  Essential (primary) hypertension  Lennox Gann M.D.

 

 2020  R29.6  Repeated falls  Gabby Worthington, N.P.

 

 2020  R26.0  Ataxic gait  Gabby Worthington, N.P.







Plan of Treatment

2018 - Saman May M.D.G60.8 Other hereditary and idiopathic 
neuropathiesFollow up:Follow up 1 week after NS sgrxlgwqqayW71.11 Alcohol abuse
, in oioghbgguP97.5x9S Traumatic subdural hemorrhage with loss of consciousness 
of unspecified duration, yzflhdgH37.2 Degeneration of nervous system due to 
bgzcddvV60.129D Central cord syndrome at unspecified level of cervical spinal 
cord, subsequent encounterReferral:Han Kingsley M.D., Surgery,
HxpxgyfzpbrpR69.2 Paresthesia of skin



Functional Status







 Description

 

 No Information Available







Mental Status







 Description

 

 No Information Available







Referrals







 Description

 

 No Information Available

## 2020-04-23 NOTE — XMS REPORT
Patient Summary Document

 Created on:2020



Patient:HUMA GARCÍA Unknown

Sex:Male

:1955

External Reference #:[id number]





Demographics







 Address  [address] [place] [address]

 

 Home Phone  [phone]

 

 Preferred Language  unk

 

 Marital Status  Unknown

 

 Pentecostalism Affiliation  Unknown

 

 Race  Unknown

 

 Ethnic Group  Unknown









Author







 Organization  [facility]









Support







 Name  Relationship  Address  Phone

 

 LISA SCHROEDER, Unknown Name  Jane  Unknown Address  [phone]









Care Team Providers







 Name  Role  Phone

 

 Unavailable  Unavailable  Unavailable









Problems







 Condition  Condition  Condition  Status  Onset  Resolution  Last  Treating  
Comments



 Name  Details  Category    Date  Date  Treatment  Clinician  



             Date    

 

 Ataxia,  Ataxia,  Diagnosis  Active        Madelin  



 unspecified  unspecified      3-30      Malnoske  



               RN  

 

 Wernicke's  Wernicke's  Diagnosis  Active        Madelin  



 encephalopa  encephalopa      3-30      Malnoske  



 thy  thy            RN  

 

 Alcohol  Alcohol  Diagnosis  Active        Madelin  



 dependence  dependence      3-30      Malnoske  



 with  with            RN  



 withdrawal,  withdrawal,              



 unspecified  unspecified              

 

 Anxiety  Anxiety  Diagnosis  Active        Madelin  



 disorder,  disorder,      3-30      Malnoske  



 unspecified  unspecified            RN  

 

 Traumatic  Traumatic  Diagnosis  Active        Madelin  



 spondylopat  spondylopat      3-30      Malnoske  



 hy,  hy,            RN  



 cervical  cervical              



 region  region              

 

 Fracture of  Fracture of  Diagnosis  Active        Madelin  



 nasal  nasal      3-30      Malnoske  



 bones, subs  bones, subs            RN  



 for fx w  for fx w              



 routn heal  routn heal              

 

 Fracture of  Fracture of  Diagnosis  Active        Madelin  



 orbital  orbital            Malnoske  



 floor,  floor,            RN  



 unspecified  unspecified              



 side, 7thD  side, 7thD              

 

 Traum subdr  Traum subdr  Diagnosis  Active        Madelin  



 hem w/o  hem w/o            Malnoske  



 loss of  loss of            RN  



 consciousne  consciousne              



 ss, subs  ss, subs              

 

 Fall (on)  Fall (on)  Diagnosis  Active        Madelin  



 (from)  (from)            Malnoske  



 other  other            RN  



 stairs and  stairs and              



 steps, subs  steps, subs              



 encntr  encntr              

 

 Neuro  anxiety  Neuro/Emot  Active        Tegan  



   present  ion    3-30      (Fanta)  



         10:45:      Soo  



               [id number]  

 

 Neuro  depressive  Neuro/Emot  Active        Tegan  



   feelings  ion    3-30      (Fanta)  



   present      10:45:      Soo  



               [id number]  

 

 Medication  potential  Meds  Active        Tegan  



   clinically      3-30      (Fanta)  



   significant      10:45:      Soo  



   medication      00      [id number]  



   issue              

 

 Safety  fall risk  Safety  Active        Bill  



   factor      -      Jhonathan  



   present      14:45:      [id number]  



         00        

 

 Safety  risk for  Safety  Active        Bill  



   hospitaliza            Jhonathan  



   tion      14:45:      [id number]  



         00        

 

 Gait/Locomo  stair  PT/OT:  Active        Bill  



 tion  management  Gait/Locom          Jhonathan  



 problems  req  otion    14:45:      [id number]  



         00        







Allergies, Adverse Reactions, Alerts







 Allergy Name  Allergy  Status  Severity  Reaction(s)  Onset  Inactive  
Treating  Comments



   Type        Date  Date  Clinician  

 

 citalopram  Unknown  Active  Unknown  Reaction      Tegan  



         Unknown  3-30    (Fanta)  



               Soo  



               [id number]  

 

 hydrochlorithia  Unknown  Active  Unknown  Reaction      Tegan  



 zide        Unknown  3-30    (Fanta)  



               Soo  



               [id number]  

 

 lisinopril  Unknown  Active  Unknown  Reaction      Tegan  



         Unknown  3-30    (Fanta)  



               Soo  



               [id number]  







Medications







 Ordered  Filled  Start  Stop  Current  Ordering  Indication  Dosage  Frequency
  Signature  Comments  Components



 Medication  Medication  Date  Date  Medication?  Clinician        (SIG)    



 Name  Name                    

 

 nicotine 21  nicotine 21  2018-  No  [place]    1 patch  Unknown      



 mg/24 hr  mg/24 hr  3-30  04-09    Chris RODRIGUEZ            



 daily  daily        Eddie            



 transdermal  transdermal                    



 patch  patch                    

 

 multivitami  multivitami  2018-  No  [place]    1  Unknown      



 n capsule  n capsule  3-30  04-09    Chris RODRIGUEZ            

 

 valsartan  valsartan  2018-  No  [place]    160 mg  Unknown      



 160 mg  160 mg  3-30  04-09    Chris RODRIGUEZ            



 tablet  tablet        Eddie            

 

 buPROPion  buPROPion  2018-  No  [place]    100 mg  Unknown      



 HCL   HCL   3-30  04-09    Chris RODRIGUEZ            



 mg  mg        Eddie            



 tablet,12  tablet,12                    



 hr  hr                    



 sustained-r  sustained-r                    



 elease  elease                    

 

 ascorbic  ascorbic  2018-  No  [place]    500 mg  Unknown      



 acid  acid  3-30  04-09    Chris RODRIGUEZ            



 (vitamin C)  (vitamin C)        Eddie            



 500 mg  500 mg                    



 capsule  capsule                    

 

 omeprazole  omeprazole  2018-  No  [place]    20 mg  Unknown      



 20 mg  20 mg  3-30  04-09    Chris RODRIGUEZ            



 tablet,marlena  tablet,marlena        Eddie            



 yed release  yed release                    

 

 omega  omega  2018-  No  [place]    1000 mg  Unknown      



 3-dha-epa-f  3-dha-epa-f  3-30  04-09    MD,Chris            



 rosaura oil  rosaura oil        Eddie            



 1,000 mg  1,000 mg                    



 (120 mg-180  (120 mg-180                    



 mg) capsule  mg) capsule                    

 

 saw  saw  2018-  No  [place]    450 mg  Unknown      



 palmetto  palmetto  3-30  04-09    MD,Chris            



 fruit 450  fruit 450        Eddie            



 mg capsule  mg capsule                    

 

 metoprolol  metoprolol  2018-  No  [place]    50 mg  Unknown      



 succinate  succinate  3-30  04-09    MD,Chris            



 ER 50 mg  ER 50 mg        Eddie            



 tablet,exte  tablet,exte                    



 nded  nded                    



 release 24  release 24                    



 hr  hr                    

 

 simvastatin  simvastatin  2018-  No  [place]    20 mg  Unknown      



 20 mg  20 mg  3-30  04-09    MD,Chris            



 tablet  tablet        Eddie            

 

 ibuprofen  ibuprofen  2018-  No  [place]    400 mg  Unknown      



 200 mg  200 mg  3-30  04-09    MD,Chris            



 tablet  tablet        Eddie            

 

 Chlorasepti  Chlorasepti  2018-  No  [place]    1  Unknown      



 c Total 5  c Total 5  3-30  04-09    MD,Chris            



 mg-6 mg-10  mg-6 mg-10        Eddie            



 mg lozenges  mg lozenges                    







Vital Signs







 Vital Name  Observation Time  Observation Value  Comments

 

 SYSTOLIC mm[Hg]  2018 17:59:00  130 mm[Hg] mm[Hg]  Method: Sit

 

 SYSTOLIC mm[Hg]  2018 17:58:57  106 mm[Hg] mm[Hg]  Method: Stand

 

 DIASTOLIC mm[Hg]  2018 17:59:00  80 mm[Hg] mm[Hg]  Method: Sit

 

 DIASTOLIC mm[Hg]  2018 17:58:57  70 mm[Hg] mm[Hg]  Method: Stand

 

 PULSE  2018 17:59:00  72 /min /min  

 

 RESP RATE  2018 17:58:57  17 /min /min  

 

 TEMP  2018 17:58:57  97.9 [degF]  







Procedures

This patient has no known procedures.



Results

This patient has no known results.

## 2020-04-23 NOTE — XMS REPORT
Patient Summary Document

 Created on:2020



Patient:HUMA GARCÍA Unknown

Sex:Male

:1955

External Reference #:[id number]





Demographics







 Address  [address] [place] [address]

 

 Home Phone  [phone]

 

 Preferred Language  unk

 

 Marital Status  Unknown

 

 Lutheran Affiliation  Unknown

 

 Race  Unknown

 

 Ethnic Group  Unknown









Author







 Organization  [facility]









Support







 Name  Relationship  Address  Phone

 

 LISA SCHROEDER, Unknown Name  Jane  Unknown Address  [phone]









Care Team Providers







 Name  Role  Phone

 

 Unavailable  Unavailable  Unavailable









Problems







 Condition  Condition  Condition  Status  Onset  Resolution  Last  Treating  
Comments



 Name  Details  Category    Date  Date  Treatment  Clinician  



             Date    

 

 Wernicke's  Wernicke's  Diagnosis  Active        Emily  



 encephalopa  encephalopa      3-23      Montez  



 thy  thy            [id number]  

 

 Unspecified  Unspecified  Diagnosis  Active        Emily  



 cord  cord      3-23      Montez  



 compression  compression            [id number]  

 

 Neuro  anxiety  Neuro/Emot  Active        Tegan  



   present  ion    3-24      (Fanta)  



         09:30:      Vann  



         00      [id number]  

 

 Neuro  depressive  Neuro/Emot  Active        Tegan  



   feelings  ion    3-24      (Fanta)  



   present      09:30:      Vann  



         00      [id number]  







Allergies, Adverse Reactions, Alerts







 Allergy Name  Allergy  Status  Severity  Reaction(s)  Onset  Inactive  
Treating  Comments



   Type        Date  Date  Clinician  

 

 citalopram  Unknown  Active  Unknown  Reaction      Tegan  



         Unknown  3-30    (Fanta)  



               Soo  



               [id number]  

 

 hydrochlorithia  Unknown  Active  Unknown  Reaction      Tegan  



 zide        Unknown  3-30    (Fanta)  



               Soo  



               [id number]  

 

 lisinopril  Unknown  Active  Unknown  Reaction      Tegan  



         Unknown  3-30    (Fanta)  



               Soo  



               [id number]  







Medications







 Ordered  Filled  Start  Stop  Current  Ordering  Indication  Dosage  Frequency
  Signature  Comments  Components



 Medication  Medication  Date  Date  Medication?  Clinician        (SIG)    



 Name  Name                    

 

 multivitami  multivitami  -0    Yes  John    Unknown  Unknown      



 n capsule  n capsule  3-24      Apryl RODRIGUEZ            

 

 valsartan  valsartan  -0    Yes  John    Unknown  Unknown      



 160 mg  160 mg  3-24      Apryl RODRIGUEZ            



 tablet  tablet                    

 

 buPROPion  buPROPion  -0    Yes  John    Unknown  Unknown      



 HCl   HCl   3-24      Apryl RODRIGUEZ            



 mg  mg                    



 tablet,12  tablet,12                    



 hr  hr                    



 sustained-r  sustained-r                    



 elease  elease                    

 

 ascorbic  ascorbic  -0    Yes  John    Unknown  Unknown      



 acid  acid  3-24      Apryl RODRIGUEZ            



 (vitamin C)  (vitamin C)                    



 500 mg  500 mg                    



 capsule  capsule                    

 

 omeprazole  omeprazole  -0    Yes  John    Unknown  Unknown      



 20 mg  20 mg  3-24      MD,Apryl            



 tablet,marlena  tablet,marlena                    



 yed release  yed release                    

 

 omega  omega  2020-0    Yes  John    Unknown  Unknown      



 3-dha-epa-f  3-dha-epa-f  3-24      MD,Apryl            



 rosaura oil  rosaura oil                    



 1,000 mg  1,000 mg                    



 (120 mg-180  (120 mg-180                    



 mg) capsule  mg) capsule                    

 

 metoprolol  metoprolol  -0    Yes  John    Unknown  Unknown      



 succinate  succinate  3-24      MD,Apryl            



 ER 50 mg  ER 50 mg                    



 tablet,exte  tablet,exte                    



 nded  nded                    



 release 24  release 24                    



 hr  hr                    

 

 simvastatin  simvastatin  -0    Yes  John    Unknown  Unknown      



 20 mg  20 mg  3-24      MD,Apryl            



 tablet  tablet                    







Vital Signs







 Vital Name  Observation Time  Observation Value  Comments

 

 SYSTOLIC mm[Hg]  2020 18:11:04  128 mm[Hg] mm[Hg]  Method: Sit

 

 DIASTOLIC mm[Hg]  2020 18:11:04  64 mm[Hg] mm[Hg]  Method: Sit

 

 PULSE  2020 18:11:04  87 /min /min  

 

 RESP RATE  2020 18:11:04  15 /min /min  

 

 TEMP  2020 18:11:04  98.9 [degF]  







Procedures

This patient has no known procedures.



Results

This patient has no known results.

## 2020-04-23 NOTE — XMS REPORT
Continuity of Care Document (CCD)

 Created on:2020



Patient:Tanner Fisehr

Sex:Male

:1955

External Reference #:[id number]





Demographics







 Address  [address]



   [place] [address]

 

 Mobile Phone  [phone]

 

 Work Phone  [phone]

 

 Email Address  [email or web address]

 

 Preferred Language  en

 

 Marital Status  Not  or 

 

 Restoration Affiliation  Unknown

 

 Race  White

 

 Ethnic Group  Not  or 









Author







 Name  Deb Lerner PA-C (transmitted by agent of provider Tamara Cedeno)

 

 Address  [address]



   Unavailable



   [place] [address]









Care Team Providers







 Name  Role  Phone

 

 Sahil Jc MD -  Care Team Information   [phone]



 Otolaryngology    

 

 Joseph Busby MD - Urology  Care Team Information   [phone]

 

 Trell Grijalva MD - Orthopaedic  Care Team Information   [phone]
[phone]



 Surgery    

 

 Althea Rodriguez M.D. - Family Medicine  Care Team Information   [phone]
[phone]









Problems







 Active Problems  Provider  Date

 

 Mixed hyperlipidemia  Darwin Miranda M.D.,FACP  Onset: 2008

 

 Essential tremor  Darwin Miranda M.D.,FACP  Onset: 2008

 

 Essential hypertension  Ross Tejeda NP  Onset: 2016

 

 Alcohol-induced cerebellar ataxia  Darwin Miranda M.D.,FACP  Onset: 2018

 

 Chronic alcoholism in remission  Darwin Miranda M.D.,FACP  Onset: 2018

 

 Ex-smoker  Darwin Miranda M.D.,FACP  Onset: 2018

 

 Wernicke's disease  Saman May M.D.  Onset: 2018

 

 Spinal cord injury without spinal bone  Saman May M.D.  Onset: 2018



 injury    

 

 Static encephalopathy  Saman May M.D.  Onset: 2018

 

 Late effect of intracranial injury  Saman May M.D.  Onset: 2018



 without skull fracture    

 

 Idiopathic peripheral neuropathy  Saman May M.D.  Onset: 2018

 

 Nondependent alcohol abuse in  Saman May M.D.  Onset: 2018



 remission    

 

 Cervical spondylosis with myelopathy  Han Kingsley M.D.  Onset: 2018







Social History







 Type  Date  Description  Comments

 

 Birth Sex    Unknown  

 

 Tobacco Use  Start: Unknown End:  Former Cigarette Smoker  started age 24, quit



       age 54 for 8 years,



       then smoked again



       for one year total



       31 years 1 PPD

 

 Smoking Status  Reviewed: 19  Former Cigarette Smoker  started age 24, 
quit



       age 54 for 8 years,



       then smoked again



       for one year total



       31 years 1 PPD

 

 ETOH Use    Denies alcohol use  

 

 ETOH Use    consumes 5-6 beers per  



     week  

 

 Recreational Drug Use    Denies Drug Use  

 

 Tobacco Use  Start: Unknown End:  Patient is a former  quit 2009



   Unknown  smoker  







Allergies, Adverse Reactions, Alerts







 Active Allergies  Reaction  Severity  Comments  Date

 

 Celexa      worse depression  2008

 

 Lisinopril      cough  2008

 

 HCTZ      hyperglycemia  2008







Medications







 Active Medications  SIG  Qnty  Indications  Ordering  Date



         Provider  

 

 Shingrix  0.5 milliliters  1units  Z00.01  Althea Rodriguez MD  2019



         50mcg/0.5ML  intramuscular now        



 Suspension Rec  and 1-2 months later        



   repeat        

 

 Bupropion  Take 1 Tablet By  90tabs    Rosetta Conway,  2019



 Hydrochloride ER  Mouth Every Day      M.DOdalis  



 (SR)          



     100mg Tablets ER          



 12HR          



           

 

 Metoprolol Succinate  take 1 tablet by  90tabs  I10  Roxann Nolasco,  2016



 ER  mouth every day      M.DOdalis, FACP  



   50mg Tablets ER          



 24HR          



           

 

 Simvastatin  Take 1 Tablet By  90tabs    Cherelle  2013



            20mg  Mouth Every Day In      AMADOR Dennis  



 Tablets  The Evening        



           

 

 Omeprazole  take 1 capsule by  90caps    Althea Rodriguez MD  2011



           20mg  mouth every day        



 Capsules DR Avitia  take 1 tablet by  90tabs  I10  Cherelle  2011



       160mg Tablets  mouth every day      AMADOR Dennis  



           

 

 Fish Oil + D3  1 capsule daily      Unknown  



           



 1200-1000mg-Unit          



 Capsules          



           

 

 Saw Palmetto  1 po hs  120caps    Unknown  



             450mg          



 Capsules          



           

 

 Centrum Silver Ultra  1 po qd      Unknown  



 Mens          



      Tablets          



           

 

 Ascorbic Acid  1 by mouth every day      Unknown  



              500mg          



 Tablets          



           

 

 Magnesium Oxide  2 tab by mouth every      Unknown  



                400mg  day        



 Tablets          



           







Immunizations







 CPT Code  Status  Date  Vaccine  Reaction  Lot #

 

 [id number]  Given  2019  Pneumococcal Conjugate    [id number]



       Vaccine 13 Valent For    



       Intramuscular Use    

 

 75313  Given  01/15/2018  Influenza Virus Vaccine,  no immedite reaction  [id number]



       Quadrivalent, Split,  noted .. hh  



       Preservative Free    

 

 55165  Given  2016  Zoster (Zostavax)    [id number]

 

 43155  Given  2016  Influenza Virus Vaccine,    [id number]



       Quadrivalent, Split,    



       Preservative Free    

 

 77467  Given  2016  Tdap -    [id number]



       Tetanus/Diptheria/Acellular    



       Pertussis    

 

   Given  2012  Fluzone Vaccine    [id number]

 

 63951  Given  2011  Influenza Virus 3Yrs & Over    [id number]







Vital Signs







 Date  Vital  Result  Comment

 

 2019 11:20am  Height  65 inches  5'5"









 Weight  155.00 lb  shoes

 

 Heart Rate  76 /min  

 

 BP Systolic  148 mmHg  

 

 BP Diastolic  89 mmHg  

 

 Body Temperature  98.6 F  

 

 O2 % BldC Oximetry  98 %  

 

 BMI (Body Mass Index)  25.8 kg/m2  









 2018  9:06am  Height  65 inches  5'5"









 Weight  150.25 lb  

 

 Heart Rate  74 /min  

 

 BP Systolic Sitting  128 mmHg  

 

 BP Diastolic Sitting  72 mmHg  

 

 Pain Level  0  

 

 BMI (Body Mass Index)  25.0 kg/m2  







Results







 Test  Acquired Date  Facility  Test  Result  H/L  Range  Note

 

 Laboratory test  2020  [facility]  Ammonia  49 mcmol/L  
Normal  16-53  



 finding    [address] [place] [address] [phone]          









 Vitamin B1 (Whole Blood)  127 nmol/L      1









 Basic Metabolic  2020  [facility]  Sodium  137 mmol/L  Normal
  135-145  



 Panel    [address] [place] [address] [phone]          









 Potassium  4.1 mmol/L  Normal  3.5-5.0  

 

 Chloride  104 mmol/L  Normal  101-111  

 

 Co2 Carbon Dioxide  20 mmol/L  Low  22-32  

 

 Anion Gap  13 mmol/L  High  2-11  

 

 Glucose  83 mg/dL  Normal    

 

 Blood Urea Nitrogen  12 mg/dL  Normal  6-24  

 

 Creatinine  0.68 mg/dL  Normal  0.67-1.17  

 

 BUN/Creatinine Ratio  17.6  Normal  8-20  

 

 Calcium  7.9 mg/dL  Low  8.6-10.3  

 

 Egfr Non-  117.0    >60  

 

 Egfr   141.6    >60  2









 CBC Auto  2020  [facility]  White Blood  11.4 10^3/uL  High  
3.5-10.8  



 Diff    [address]  Count        



     [place] [address] [phone]          









 Red Blood Count  3.89 10^6/uL  Low  4.18-5.48  

 

 Hemoglobin  13.5 g/dL  Low  14.0-18.0  

 

 Hematocrit  40 %  Low  42-52  

 

 Mean Corpuscular Volume  102 fL  High  80-94  

 

 Mean Corpuscular Hemoglobin  35 pg  High  27-31  

 

 Mean Corpuscular HGB Conc  34 g/dL  Normal  31-36  

 

 Red Cell Distribution Width  14 %  Normal  10-15  

 

 Platelet Count  257 10^3/uL  Normal  150-450  

 

 Mean Platelet Volume  6.7 fL  Low  7.4-10.4  

 

 Abs Neutrophils  10.4 10^3/uL  High  1.5-7.7  

 

 Abs Lymphocytes  0.5 10^3/uL  Low  1.0-4.8  

 

 Abs Monocytes  0.5 10^3/uL  Normal  0-0.8  

 

 Abs Eosinophils  0.0 10^3/uL  Normal  0-0.6  

 

 Abs Basophils  0.0 10^3/uL  Normal  0-0.2  

 

 Abs Nucleated RBC  0.0 10^3/uL      

 

 Granulocyte %  90.9 %      

 

 Lymphocyte %  4.4 %      

 

 Monocyte %  4.5 %      

 

 Eosinophil %  0.0 %      

 

 Basophil %  0.2 %      

 

 Nucleated Red Blood Cells %  0.0      









 Comp Metabolic  2020  [facility]  Sodium  138 mmol/L  Normal  
135-145  



 Panel    [address]          



     [place] [address] [phone]          









 Potassium  3.9 mmol/L  Normal  3.5-5.0  

 

 Chloride  99 mmol/L  Low  101-111  

 

 Co2 Carbon Dioxide  20 mmol/L  Low  22-32  

 

 Anion Gap  19 mmol/L  High  2-11  

 

 Glucose  127 mg/dL  High    

 

 Blood Urea Nitrogen  12 mg/dL  Normal  6-24  

 

 Creatinine  0.76 mg/dL  Normal  0.67-1.17  

 

 BUN/Creatinine Ratio  15.8  Normal  8-20  

 

 Calcium  8.7 mg/dL  Normal  8.6-10.3  

 

 Total Protein  6.8 g/dL  Normal  6.4-8.9  

 

 Albumin  3.7 g/dL  Normal  3.2-5.2  

 

 Globulin  3.1 g/dL  Normal  2-4  

 

 Albumin/Globulin Ratio  1.2  Normal  1-3  

 

 Total Bilirubin  0.90 mg/dL  Normal  0.2-1.0  

 

 Alkaline Phosphatase  105 U/L  High    

 

 Alt  35 U/L  Normal  7-52  

 

 Ast  59 U/L  High  13-39  

 

 Egfr Non-  102.9    >60  

 

 Egfr   124.6    >60  3









 Laboratory test  2020  [facility]  Alcohol  < 10 mg/dL  
Normal  <10  



 finding    [address] [place] [address] [phone]          









 TSH (Thyroid Stim Horm)  0.54 mcIU/mL  Normal  0.34-5.60  

 

 Acetaminophen  < 15 g/mL      4

 

 Salicylate  < 2.50 mg/dL    <30  

 

 Osmolality Serum  294 mOsm/kg  Normal  275-295  









 1  -------------------ADDITIONAL INFORMATION-------------------



   This test was developed and its performance characteristics



   determined by [facility] in a manner consistent with CLIA



   requirements. This test has not been cleared or approved by



   the U.S. Food and Drug Administration.



   Test Performed by:



   [facility]



   [address] [place] [address]



   : Sancho Sifuentes M.D. Ph.D.; CLIA# [id number]

 

 2  *******Because ethnic data is not always readily available,



   this report includes an eGFR for both -Americans and



   non- Americans.****



   The National Kidney Disease Education Program (NKDEP) does



   not endorse the use of the MDRD equation for patients that



   are not between the ages of 18 and 70, are pregnant, have



   extremes of body size, muscle mass, or nutritional status,



   or are non- or non-.



   According to the National Kidney Foundation, irrespective of



   diagnosis, the stage of the disease is based on the level of



   kidney function:



   Stage Description                      GFR(mL/min/1.73 m(2))



   1     Kidney damage with normal or decreased GFR       90



   2     Kidney damage with mild decrease in GFR          60-89



   3     Moderate decrease in GFR                         30-59



   4     Severe decrease in GFR                           15-29



   5     Kidney failure                       <15 (or dialysis)

 

 3  *******Because ethnic data is not always readily available,



   this report includes an eGFR for both -Americans and



   non- Americans.****



   The National Kidney Disease Education Program (NKDEP) does



   not endorse the use of the MDRD equation for patients that



   are not between the ages of 18 and 70, are pregnant, have



   extremes of body size, muscle mass, or nutritional status,



   or are non- or non-.



   According to the National Kidney Foundation, irrespective of



   diagnosis, the stage of the disease is based on the level of



   kidney function:



   Stage Description                      GFR(mL/min/1.73 m(2))



   1     Kidney damage with normal or decreased GFR       90



   2     Kidney damage with mild decrease in GFR          60-89



   3     Moderate decrease in GFR                         30-59



   4     Severe decrease in GFR                           15-29



   5     Kidney failure                       <15 (or dialysis)

 

 4  Therapeutic concentration: <50 ug/mL



   Toxic concentration: >120 ug/mL







Procedures







 Date  Code  Description  Status

 

 2020  56903  ECHO Transthorasic Realtime 2D W Doppler & Color Flow  
Completed



     Hosp  

 

 2018  534867034  Diabetic Retinal Eye Exam  Completed

 

 2017  00260679  Colonoscopy  Completed

 

 2006  60112287  Colonoscopy  Completed







Medical Devices







 Description

 

 No Information Available







Encounters







 Type  Date  Location  Provider  Dx  Diagnosis

 

 Office Visit  2020  [facility]  Karey Garrido,  M48.02  Spinal 
stenosis,



   2:06p  yogi Walsh DO    cervical region



     Hospitalists      









 R26.0  Ataxic gait









 Office Visit  2020  2:06p  [facility]  Deb  M48.02  Spinal



     Assocyogi PA-C    stenosis,



     Hospitalists      cervical region









 R26.0  Ataxic gait

 

 E83.42  Hypomagnesemia









 Office Visit  2020  [facility]  M48.02  Spinal



   2:05p  yogi Walsh M.D.    stenosis,



     Hospitalists      cervical region









 M50.021  Cervical disc disorder at C4-C5 level with myelopathy

 

 R26.0  Ataxic gait

 

 I10  Essential (primary) hypertension









 Office Visit  2020  [facility]  M48.02  Spinal



   2:05p  Assoc,yogi Gann M.D.    stenosis,



     Hospitalists      cervical region









 R26.0  Ataxic gait

 

 F10.21  Alcohol dependence, in remission

 

 I10  Essential (primary) hypertension









 Office Visit  03/15/2020  2:04p  [facility]  [place]  R26.0  Ataxic gait



     Assoc,yogi Gann M.D.    



     Hospitalists      









 R53.1  Weakness

 

 R25.1  Tremor, unspecified

 

 I10  Essential (primary) hypertension









 Office Visit  2020  2:03p  [facility]  Gabby Ander,  R29.6  Repeated 
falls



     Assoc,pc  N.P.    



     Hospitalists      









 R26.0  Ataxic gait







Assessments







 Date  Code  Description  Provider

 

 2020  M48.02  Spinal stenosis, cervical region  Karey Senner, DO

 

 2020  M50.021  Cervical disc disorder at C4-C5 level  Karey Garrido, DO



     with myelopathy  

 

 2020  R26.0  Ataxic gait  Karey Senner, DO

 

 2020  R29.6  Repeated falls  Karey Senner, DO

 

 2020  M48.02  Spinal stenosis, cervical region  Karey Senner, DO

 

 2020  R26.0  Ataxic gait  Karey Senmelva, DO

 

 2020  M48.02  Spinal stenosis, cervical region  Deb Lerner PA-C

 

 2020  I10  Essential (primary) hypertension  Cailin Marks M.D.

 

 2020  R26.0  Ataxic gait  Deb Lerner PA-C

 

 2020  E83.42  Hypomagnesemia  Deb Lerner PA-C

 

 2020  M48.02  Spinal stenosis, cervical region  Lennox Gann M.D.

 

 2020  M50.021  Cervical disc disorder at C4-C5 level  Lennox Gann M.D.



     with myelopathy  

 

 2020  R26.0  Ataxic gait  Lennox Gann M.D.

 

 2020  I10  Essential (primary) hypertension  Lennox Gann M.D.

 

 2020  M48.02  Spinal stenosis, cervical region  Lennox Gann M.D.

 

 2020  R26.0  Ataxic gait  Lennox Gann M.D.

 

 2020  F10.21  Alcohol dependence, in remission  Lennox Gann M.D.

 

 2020  I10  Essential (primary) hypertension  Lennox Gann M.D.

 

 03/15/2020  R26.0  Ataxic gait  Lennox Gann M.D.

 

 03/15/2020  R53.1  Weakness  Lennox Gann M.D.

 

 03/15/2020  R25.1  Tremor, unspecified  Lennox Gann M.D.

 

 03/15/2020  I10  Essential (primary) hypertension  Lennox Gann M.D.

 

 2020  R29.6  Repeated falls  Gabby Worthington, N.P.

 

 2020  R26.0  Ataxic gait  Gabby Worthington, N.P.







Plan of Treatment

2018 - Saman May M.D.G60.8 Other hereditary and idiopathic 
neuropathiesFollow up:Follow up 1 week after NS ivryggcmnpcF57.11 Alcohol abuse
, in bjvppqihcF18.5x9S Traumatic subdural hemorrhage with loss of consciousness 
of unspecified duration, mciymigR79.2 Degeneration of nervous system due to 
ghbvghcU75.129D Central cord syndrome at unspecified level of cervical spinal 
cord, subsequent encounterReferral:Han Kingsley M.D., Surgery,
QroteueovpoiK47.2 Paresthesia of skin



Functional Status







 Description

 

 No Information Available







Mental Status







 Description

 

 No Information Available







Referrals







 Description

 

 No Information Available

## 2020-04-23 NOTE — XMS REPORT
Continuity of Care Document (CCD)

 Created on:2020



Patient:Tanner Fisher

Sex:Male

:1955

External Reference #:[id number]





Demographics







 Address  [address]



   [place] [address]

 

 Mobile Phone  [phone]

 

 Work Phone  [phone]

 

 Email Address  [email or web address]

 

 Preferred Language  en

 

 Marital Status  Not  or 

 

 Latter day Affiliation  Unknown

 

 Race  White

 

 Ethnic Group  Not  or 









Author







 Name  Karey Garrido DO (transmitted by agent of provider )

 

 Address  [address] [place] [address]









Care Team Providers







 Name  Role  Phone

 

 Sahil Jc MD -  Care Team Information   [phone]



 Otolaryngology    

 

 Joseph Busby MD - Urology  Care Team Information   [phone]

 

 Trell Grijalva MD - Orthopaedic  Care Team Information   [phone]
[phone]



 Surgery    

 

 Althea Rodriguez M.D. - Family Medicine  Care Team Information   [phone]
[phone]









Problems







 Active Problems  Provider  Date

 

 Mixed hyperlipidemia  Darwin Miranda M.D.,FACP  Onset: 2008

 

 Essential tremor  Darwin Miarnda M.D.,FACP  Onset: 2008

 

 Essential hypertension  Ross Tejeda NP  Onset: 2016

 

 Alcohol-induced cerebellar ataxia  Darwin Miranda M.D.,FACP  Onset: 2018

 

 Chronic alcoholism in remission  Darwin Miranda M.D.,FACP  Onset: 2018

 

 Ex-smoker  Darwin Miranda M.D.,FACP  Onset: 2018

 

 Wernicke's disease  Saman May M.D.  Onset: 2018

 

 Spinal cord injury without spinal bone  Saman May M.D.  Onset: 2018



 injury    

 

 Static encephalopathy  Saman May M.D.  Onset: 2018

 

 Late effect of intracranial injury  Saman May M.D.  Onset: 2018



 without skull fracture    

 

 Idiopathic peripheral neuropathy  Saman May M.D.  Onset: 2018

 

 Nondependent alcohol abuse in  Saman May M.D.  Onset: 2018



 remission    

 

 Cervical spondylosis with myelopathy  Han Kingslye M.D.  Onset: 2018







Social History







 Type  Date  Description  Comments

 

 Birth Sex    Unknown  

 

 Tobacco Use  Start: Unknown End:  Former Cigarette Smoker  started age 24, quit



       age 54 for 8 years,



       then smoked again



       for one year total



       31 years 1 PPD

 

 Smoking Status  Reviewed: 19  Former Cigarette Smoker  started age 24, 
quit



       age 54 for 8 years,



       then smoked again



       for one year total



       31 years 1 PPD

 

 ETOH Use    Denies alcohol use  

 

 ETOH Use    consumes 5-6 beers per  



     week  

 

 Recreational Drug Use    Denies Drug Use  

 

 Tobacco Use  Start: Unknown End:  Patient is a former  quit 2009



   Unknown  smoker  







Allergies, Adverse Reactions, Alerts







 Active Allergies  Reaction  Severity  Comments  Date

 

 Celexa      worse depression  2008

 

 Lisinopril      cough  2008

 

 HCTZ      hyperglycemia  2008







Medications







 Active Medications  SIG  Qnty  Indications  Ordering  Date



         Provider  

 

 Shingrix  0.5 milliliters  1units  Z00.01  Althea Rodriguez MD  2019



         50mcg/0.5ML  intramuscular now        



 Suspension Rec  and 1-2 months later        



   repeat        

 

 Bupropion  Take 1 Tablet By  90tabs    Rosetta Conway,  2019



 Hydrochloride ER  Mouth Every Day      M.D.  



 (SR)          



     100mg Tablets ER          



 12HR          



           

 

 Metoprolol Succinate  take 1 tablet by  90tabs  I10  Roxann Nolasco,  2016



 ER  mouth every day      M.D., FACP  



   50mg Tablets ER          



 24HR          



           

 

 Simvastatin  Take 1 Tablet By  90tabs    Cherelle  2013



            20mg  Mouth Every Day In      AMADOR Dennis  



 Tablets  The Evening        



           

 

 Omeprazole  take 1 capsule by  90caps    Althea Rodriguez MD  2011



           20mg  mouth every day        



 Capsules DR Avitia  take 1 tablet by  90tabs  I10  Cherelle  2011



       160mg Tablets  mouth every day      AMADOR Dennis  



           

 

 Fish Oil + D3  1 capsule daily      Unknown  



           



 1200-1000mg-Unit          



 Capsules          



           

 

 Saw Palmetto  1 po hs  120caps    Unknown  



             450mg          



 Capsules          



           

 

 Centrum Silver Ultra  1 po qd      Unknown  



 Mens          



      Tablets          



           

 

 Ascorbic Acid  1 by mouth every day      Unknown  



              500mg          



 Tablets          



           

 

 Magnesium Oxide  2 tab by mouth every      Unknown  



                400mg  day        



 Tablets          



           







Immunizations







 CPT Code  Status  Date  Vaccine  Reaction  Lot #

 

 [id number]  Given  2019  Pneumococcal Conjugate    [id number]



       Vaccine 13 Valent For    



       Intramuscular Use    

 

 [id number]  Given  01/15/2018  Influenza Virus Vaccine,  no immedite reaction  [id number]



       Quadrivalent, Split,  noted .. hh  



       Preservative Free    

 

 66359  Given  2016  Zoster (Zostavax)    [id number]

 

 62529  Given  2016  Influenza Virus Vaccine,    [id number]



       Quadrivalent, Split,    



       Preservative Free    

 

 35989  Given  2016  Tdap -    [id number]



       Tetanus/Diptheria/Acellular    



       Pertussis    

 

   Given  2012  Fluzone Vaccine    [id number]

 

 68530  Given  2011  Influenza Virus 3Yrs & Over    [id number]







Vital Signs







 Date  Vital  Result  Comment

 

 2019 11:20am  Height  65 inches  5'5"









 Weight  155.00 lb  shoes

 

 Heart Rate  76 /min  

 

 BP Systolic  148 mmHg  

 

 BP Diastolic  89 mmHg  

 

 Body Temperature  98.6 F  

 

 O2 % BldC Oximetry  98 %  

 

 BMI (Body Mass Index)  25.8 kg/m2  









 2018  9:06am  Height  65 inches  5'5"









 Weight  150.25 lb  

 

 Heart Rate  74 /min  

 

 BP Systolic Sitting  128 mmHg  

 

 BP Diastolic Sitting  72 mmHg  

 

 Pain Level  0  

 

 BMI (Body Mass Index)  25.0 kg/m2  







Results







 Test  Acquired Date  Facility  Test  Result  H/L  Range  Note

 

 Laboratory test  2020  [facility]  Ammonia  49 mcmol/L  
Normal  16-53  



 finding    [address] [place] [address] [phone]          









 Vitamin B1 (Whole Blood)  127 nmol/L      1









 Basic Metabolic  2020  [facility]  Sodium  137 mmol/L  Normal
  135-145  



 Panel    [address] [place] [address] [phone]          









 Potassium  4.1 mmol/L  Normal  3.5-5.0  

 

 Chloride  104 mmol/L  Normal  101-111  

 

 Co2 Carbon Dioxide  20 mmol/L  Low  22-32  

 

 Anion Gap  13 mmol/L  High  2-11  

 

 Glucose  83 mg/dL  Normal    

 

 Blood Urea Nitrogen  12 mg/dL  Normal  6-24  

 

 Creatinine  0.68 mg/dL  Normal  0.67-1.17  

 

 BUN/Creatinine Ratio  17.6  Normal  8-20  

 

 Calcium  7.9 mg/dL  Low  8.6-10.3  

 

 Egfr Non-  117.0    >60  

 

 Egfr   141.6    >60  2









 CBC Auto  2020  [facility]  White Blood  11.4 10^3/uL  High  
3.5-10.8  



 Diff    [address]  Count        



     [place] [address] [phone]          









 Red Blood Count  3.89 10^6/uL  Low  4.18-5.48  

 

 Hemoglobin  13.5 g/dL  Low  14.0-18.0  

 

 Hematocrit  40 %  Low  42-52  

 

 Mean Corpuscular Volume  102 fL  High  80-94  

 

 Mean Corpuscular Hemoglobin  35 pg  High  27-31  

 

 Mean Corpuscular HGB Conc  34 g/dL  Normal  31-36  

 

 Red Cell Distribution Width  14 %  Normal  10-15  

 

 Platelet Count  257 10^3/uL  Normal  150-450  

 

 Mean Platelet Volume  6.7 fL  Low  7.4-10.4  

 

 Abs Neutrophils  10.4 10^3/uL  High  1.5-7.7  

 

 Abs Lymphocytes  0.5 10^3/uL  Low  1.0-4.8  

 

 Abs Monocytes  0.5 10^3/uL  Normal  0-0.8  

 

 Abs Eosinophils  0.0 10^3/uL  Normal  0-0.6  

 

 Abs Basophils  0.0 10^3/uL  Normal  0-0.2  

 

 Abs Nucleated RBC  0.0 10^3/uL      

 

 Granulocyte %  90.9 %      

 

 Lymphocyte %  4.4 %      

 

 Monocyte %  4.5 %      

 

 Eosinophil %  0.0 %      

 

 Basophil %  0.2 %      

 

 Nucleated Red Blood Cells %  0.0      









 Comp Metabolic  2020  [facility]  Sodium  138 mmol/L  Normal  
135-145  



 Panel    [address]          



     [place] [address] [phone]          









 Potassium  3.9 mmol/L  Normal  3.5-5.0  

 

 Chloride  99 mmol/L  Low  101-111  

 

 Co2 Carbon Dioxide  20 mmol/L  Low  22-32  

 

 Anion Gap  19 mmol/L  High  2-11  

 

 Glucose  127 mg/dL  High    

 

 Blood Urea Nitrogen  12 mg/dL  Normal  6-24  

 

 Creatinine  0.76 mg/dL  Normal  0.67-1.17  

 

 BUN/Creatinine Ratio  15.8  Normal  8-20  

 

 Calcium  8.7 mg/dL  Normal  8.6-10.3  

 

 Total Protein  6.8 g/dL  Normal  6.4-8.9  

 

 Albumin  3.7 g/dL  Normal  3.2-5.2  

 

 Globulin  3.1 g/dL  Normal  2-4  

 

 Albumin/Globulin Ratio  1.2  Normal  1-3  

 

 Total Bilirubin  0.90 mg/dL  Normal  0.2-1.0  

 

 Alkaline Phosphatase  105 U/L  High    

 

 Alt  35 U/L  Normal  7-52  

 

 Ast  59 U/L  High  13-39  

 

 Egfr Non-  102.9    >60  

 

 Egfr   124.6    >60  3









 Laboratory test  2020  [facility]  Alcohol  < 10 mg/dL  
Normal  <10  



 finding    [address] [place] [address] [phone]          









 TSH (Thyroid Stim Horm)  0.54 mcIU/mL  Normal  0.34-5.60  

 

 Acetaminophen  < 15 g/mL      4

 

 Salicylate  < 2.50 mg/dL    <30  

 

 Osmolality Serum  294 mOsm/kg  Normal  275-295  









 1  -------------------ADDITIONAL INFORMATION-------------------



   This test was developed and its performance characteristics



   determined by [facility] in a manner consistent with CLIA



   requirements. This test has not been cleared or approved by



   the U.S. Food and Drug Administration.



   Test Performed by:



   [facility] [place]



   [address] [place] [address]



   : Sancho Sifuentes M.D. Ph.D.; CLIA# [id number]

 

 2  *******Because ethnic data is not always readily available,



   this report includes an eGFR for both -Americans and



   non- Americans.****



   The National Kidney Disease Education Program (NKDEP) does



   not endorse the use of the MDRD equation for patients that



   are not between the ages of 18 and 70, are pregnant, have



   extremes of body size, muscle mass, or nutritional status,



   or are non- or non-.



   According to the National Kidney Foundation, irrespective of



   diagnosis, the stage of the disease is based on the level of



   kidney function:



   Stage Description                      GFR(mL/min/1.73 m(2))



   1     Kidney damage with normal or decreased GFR       90



   2     Kidney damage with mild decrease in GFR          60-89



   3     Moderate decrease in GFR                         30-59



   4     Severe decrease in GFR                           15-29



   5     Kidney failure                       <15 (or dialysis)

 

 3  *******Because ethnic data is not always readily available,



   this report includes an eGFR for both -Americans and



   non- Americans.****



   The National Kidney Disease Education Program (NKDEP) does



   not endorse the use of the MDRD equation for patients that



   are not between the ages of 18 and 70, are pregnant, have



   extremes of body size, muscle mass, or nutritional status,



   or are non- or non-.



   According to the National Kidney Foundation, irrespective of



   diagnosis, the stage of the disease is based on the level of



   kidney function:



   Stage Description                      GFR(mL/min/1.73 m(2))



   1     Kidney damage with normal or decreased GFR       90



   2     Kidney damage with mild decrease in GFR          60-89



   3     Moderate decrease in GFR                         30-59



   4     Severe decrease in GFR                           15-29



   5     Kidney failure                       <15 (or dialysis)

 

 4  Therapeutic concentration: <50 ug/mL



   Toxic concentration: >120 ug/mL







Procedures







 Date  Code  Description  Status

 

 2020  38730  ECHO Transthorasic Realtime 2D W Doppler & Color Flow  
Completed



     Hosp  

 

 2018  786878547  Diabetic Retinal Eye Exam  Completed

 

 2017  37444704  Colonoscopy  Completed

 

 2006  [id number]  Colonoscopy  Completed







Medical Devices







 Description

 

 No Information Available







Encounters







 Type  Date  Location  Provider  Dx  Diagnosis

 

 Office Visit  2020  [facility]  Gaurav Romero,  M48.02  Spinal 
stenosis,



   10:00a  Medicine - Marlena ENGLISH    cervical region









 I10  Essential (primary) hypertension

 

 F10.21  Alcohol dependence, in remission









 Office Visit  2020  2:07p  [facility]  Karey  M48.02  Spinal



     Assoc,yogi Garrido, DO    stenosis,



     Hospitalists      cervical region









 M50.021  Cervical disc disorder at C4-C5 level with myelopathy

 

 R26.0  Ataxic gait

 

 R29.6  Repeated falls









 Office Visit  2020  2:06p  [facility]  Karey  M48.02  Spinal



     Assoc,yogi Garrido DO    stenosis,



     Hospitalists      cervical region









 R26.0  Ataxic gait









 Office Visit  2020  2:06p  [facility]  Deb  M48.02  Spinal



     Assoc,yogi Lerner PA-C    stenosis,



     Hospitalists      cervical region









 R26.0  Ataxic gait

 

 E83.42  Hypomagnesemia









 Office Visit  2020  [facility]  M48.02  Spinal



   2:05p  Assyogi starr M.D.    stenosis,



     Hospitalists      cervical region









 M50.021  Cervical disc disorder at C4-C5 level with myelopathy

 

 R26.0  Ataxic gait

 

 I10  Essential (primary) hypertension









 Office Visit  2020  [facility]  M48.02  Spinal



   2:05p  Assyogi starr M.D.    stenosis,



     Hospitalists      cervical region









 R26.0  Ataxic gait

 

 F10.21  Alcohol dependence, in remission

 

 I10  Essential (primary) hypertension









 Office Visit  03/15/2020  2:04p  [facility]  R26.0  Ataxic gait



     Assoc,yogi Gann M.D.    



     Hospitalists      









 R53.1  Weakness

 

 R25.1  Tremor, unspecified

 

 I10  Essential (primary) hypertension









 Office Visit  2020  2:03p  [facility]  Gabby Worthington,  R29.6  Repeated 
falls



     Assoc,yogi  N.POdalis    



     Hospitalists      









 R26.0  Ataxic gait







Assessments







 Date  Code  Description  Provider

 

 2020  M48.02  Spinal stenosis, cervical region  Gaurav Romero M.D.

 

 2020  I10  Essential (primary) hypertension  Gaurav Romero M.D.

 

 2020  F10.21  Alcohol dependence, in remission  Gaurav Romero M.D.

 

 2020  M48.02  Spinal stenosis, cervical region  Karey Senner, DO

 

 2020  M50.021  Cervical disc disorder at C4-C5 level  Karey Senner, DO



     with myelopathy  

 

 2020  R26.0  Ataxic gait  Karey Senner, DO

 

 2020  R29.6  Repeated falls  Karey Senner, DO

 

 2020  M48.02  Spinal stenosis, cervical region  Karey Senner, DO

 

 2020  R26.0  Ataxic gait  Karey Senner, DO

 

 2020  M48.02  Spinal stenosis, cervical region  Deb Lerner PA-C

 

 2020  I10  Essential (primary) hypertension  Cailin Marks M.D.

 

 2020  R26.0  Ataxic gait  Deb Lerner PA-C

 

 2020  E83.42  Hypomagnesemia  Deb Lerner PA-C

 

 2020  M48.02  Spinal stenosis, cervical region  Lennox Gann M.D.

 

 2020  M50.021  Cervical disc disorder at C4-C5 level  Lennox Gann M.D.



     with myelopathy  

 

 2020  R26.0  Ataxic gait  Lennox Gann M.D.

 

 2020  I10  Essential (primary) hypertension  Lennox Gann M.D.

 

 2020  M48.02  Spinal stenosis, cervical region  Lennox Gann M.D.

 

 2020  R26.0  Ataxic gait  Lennox Gann M.D.

 

 2020  F10.21  Alcohol dependence, in remission  Lennox Gann M.D.

 

 2020  I10  Essential (primary) hypertension  Lennox Gann M.D.

 

 03/15/2020  R26.0  Ataxic gait  Lennox Gann M.D.

 

 03/15/2020  R53.1  Weakness  Lennox Gann M.D.

 

 03/15/2020  R25.1  Tremor, unspecified  Lennox Gann M.D.

 

 03/15/2020  I10  Essential (primary) hypertension  Lennox Gann M.D.

 

 2020  R29.6  Repeated falls  Gabby Worthington, N.P.

 

 2020  R26.0  Ataxic gait  Gabby Worthington, N.P.







Plan of Treatment

2020 - Gaurav Romero M.D.M48.02 Spinal stenosis, cervical 
regionComments:Increased weakness and tingling symptoms prompting his recent 
admission.  Neurosurgical evaluation resulted in a recommendation for cervical 
spine surgery for his severe spinal canal stenosis.  Patientdeclined surgical 
intervention at that time and is interested in getting a second opinion.  No 
acutechanges noted but patient advised to proceed with the second opinion as 
soon as possible given the serious findings on his scan.  Patient also advised 
to contact [facility] radiology to get a copy of his MRI scans on disc for his planned 
neurosurgical consult.I10 Essential (primary) hypertensionComments:No BP 
problems in the hospital per patient.  Occasional home blood pressure checks 
advised.F10.21 Alcohol dependence, in remissionComments:(+) Past history of 
alcohol abuse with ? encephalopathy in 2018.  Patient denies heavy intake now 
but admits he is still drinking daily.



Functional Status







 Description

 

 No Information Available







Mental Status







 Description

 

 No Information Available







Referrals







 Description

 

 No Information Available

## 2020-04-23 NOTE — XMS REPORT
Continuity of Care Document (CCD)

 Created on:2020



Patient:Tanner Fisher

Sex:Male

:1955

External Reference #:[id number]





Demographics







 Address  [address]



   [place] [address]

 

 Mobile Phone  [phone]

 

 Work Phone  [phone]

 

 Email Address  [email or web address]

 

 Preferred Language  en

 

 Marital Status  Not  or 

 

 Hinduism Affiliation  Unknown

 

 Race  White

 

 Ethnic Group  Not  or 









Author







 Name  Gaurav Romero M.D. (transmitted by agent of provider Elham Porter)

 

 Address  [address]



   Unavailable



   [place] [address]









Care Team Providers







 Name  Role  Phone

 

 Sahil Jc MD -  Care Team Information   [phone]



 Otolaryngology    

 

 Joseph Busby MD - Urology  Care Team Information   [phone]

 

 Trell Grijalva MD - Orthopaedic  Care Team Information   [phone]
[phone]



 Surgery    

 

 Althea Rodriguez M.D. - Family Medicine  Care Team Information   [phone]
[phone]









Problems







 Active Problems  Provider  Date

 

 Mixed hyperlipidemia  Darwin Miranda M.D.,FACP  Onset: 2008

 

 Essential tremor  Darwin Miranda M.D.,FACP  Onset: 2008

 

 Essential hypertension  Ross Tejeda NP  Onset: 2016

 

 Alcohol-induced cerebellar ataxia  Darwin Miranda M.D.,FACP  Onset: 2018

 

 Chronic alcoholism in remission  Darwin Miranda M.D.,FACP  Onset: 2018

 

 Ex-smoker  Darwin Miranda M.D.,FACP  Onset: 2018

 

 Wernicke's disease  Saman May M.D.  Onset: 2018

 

 Spinal cord injury without spinal bone  Saman May M.D.  Onset: 2018



 injury    

 

 Static encephalopathy  Saman May M.D.  Onset: 2018

 

 Late effect of intracranial injury  Saman May M.D.  Onset: 2018



 without skull fracture    

 

 Idiopathic peripheral neuropathy  Saman May M.D.  Onset: 2018

 

 Nondependent alcohol abuse in  Saman May M.D.  Onset: 2018



 remission    

 

 Cervical spondylosis with myelopathy  Han Kingsley M.D.  Onset: 2018







Social History







 Type  Date  Description  Comments

 

 Birth Sex    Unknown  

 

 Tobacco Use  Start: Unknown End:  Former Cigarette Smoker  started age 24, quit



       age 54 for 8 years,



       then smoked again



       for one year total



       31 years 1 PPD

 

 Smoking Status  Reviewed: 19  Former Cigarette Smoker  started age 24, 
quit



       age 54 for 8 years,



       then smoked again



       for one year total



       31 years 1 PPD

 

 ETOH Use    Denies alcohol use  

 

 ETOH Use    consumes 5-6 beers per  



     week  

 

 Recreational Drug Use    Denies Drug Use  

 

 Tobacco Use  Start: Unknown End:  Patient is a former  quit 2009



   Unknown  smoker  







Allergies, Adverse Reactions, Alerts







 Active Allergies  Reaction  Severity  Comments  Date

 

 Celexa      worse depression  2008

 

 Lisinopril      cough  2008

 

 HCTZ      hyperglycemia  2008







Medications







 Active Medications  SIG  Qnty  Indications  Ordering  Date



         Provider  

 

 Shingrix  0.5 milliliters  1units  Z00.01  Althea Rodriguez MD  2019



         50mcg/0.5ML  intramuscular now        



 Suspension Rec  and 1-2 months later        



   repeat        

 

 Bupropion  Take 1 Tablet By  90tabs    Rosetta Conway,  2019



 Hydrochloride ER  Mouth Every Day      M.D.  



 (SR)          



     100mg Tablets ER          



 12HR          



           

 

 Metoprolol Succinate  take 1 tablet by  90tabs  I10  Roxann Nolasco,  2016



 ER  mouth every day      M.DOdalis, FACP  



   50mg Tablets ER          



 24HR          



           

 

 Simvastatin  Take 1 Tablet By  90tabs    Cherelle  2013



            20mg  Mouth Every Day In      AMADOR Dennis  



 Tablets  The Evening        



           

 

 Omeprazole  take 1 capsule by  90caps    Althea Rodriguez MD  2011



           20mg  mouth every day        



 Capsules DR Avitia  take 1 tablet by  90tabs  I10  Cherelle  2011



       160mg Tablets  mouth every day      AMADOR Dennis  



           

 

 Fish Oil + D3  1 capsule daily      Unknown  



           



 1200-1000mg-Unit          



 Capsules          



           

 

 Saw Palmetto  1 po hs  120caps    Unknown  



             450mg          



 Capsules          



           

 

 Centrum Silver Ultra  1 po qd      Unknown  



 Mens          



      Tablets          



           

 

 Ascorbic Acid  1 by mouth every day      Unknown  



              500mg          



 Tablets          



           

 

 Magnesium Oxide  2 tab by mouth every      Unknown  



                400mg  day        



 Tablets          



           







Immunizations







 CPT Code  Status  Date  Vaccine  Reaction  Lot #

 

 [id number]  Given  2019  Pneumococcal Conjugate    [id number]



       Vaccine 13 Valent For    



       Intramuscular Use    

 

 16235  Given  01/15/2018  Influenza Virus Vaccine,  no immedite reaction  [id number]



       Quadrivalent, Split,  noted .. hh  



       Preservative Free    

 

 83147  Given  2016  Zoster (Zostavax)    [id number]

 

 44464  Given  2016  Influenza Virus Vaccine,    [id number]



       Quadrivalent, Split,    



       Preservative Free    

 

 37637  Given  2016  Tdap -    [id number]



       Tetanus/Diptheria/Acellular    



       Pertussis    

 

   Given  2012  Fluzone Vaccine    [id number]

 

 39346  Given  2011  Influenza Virus 3Yrs & Over    [id number]







Vital Signs







 Date  Vital  Result  Comment

 

 2019 11:20am  Height  65 inches  5'5"









 Weight  155.00 lb  shoes

 

 Heart Rate  76 /min  

 

 BP Systolic  148 mmHg  

 

 BP Diastolic  89 mmHg  

 

 Body Temperature  98.6 F  

 

 O2 % BldC Oximetry  98 %  

 

 BMI (Body Mass Index)  25.8 kg/m2  









 2018  9:06am  Height  65 inches  5'5"









 Weight  150.25 lb  

 

 Heart Rate  74 /min  

 

 BP Systolic Sitting  128 mmHg  

 

 BP Diastolic Sitting  72 mmHg  

 

 Pain Level  0  

 

 BMI (Body Mass Index)  25.0 kg/m2  







Results







 Test  Acquired Date  Facility  Test  Result  H/L  Range  Note

 

 Laboratory test  2020  [facility]  Ammonia  49 mcmol/L  
Normal  16-53  



 finding    [address] [place] [address] [phone]          









 Vitamin B1 (Whole Blood)  127 nmol/L      1









 Basic Metabolic  2020  [facility]  Sodium  137 mmol/L  Normal
  135-145  



 Panel    [address] [place] [address] [phone]          









 Potassium  4.1 mmol/L  Normal  3.5-5.0  

 

 Chloride  104 mmol/L  Normal  101-111  

 

 Co2 Carbon Dioxide  20 mmol/L  Low  22-32  

 

 Anion Gap  13 mmol/L  High  2-11  

 

 Glucose  83 mg/dL  Normal    

 

 Blood Urea Nitrogen  12 mg/dL  Normal  6-24  

 

 Creatinine  0.68 mg/dL  Normal  0.67-1.17  

 

 BUN/Creatinine Ratio  17.6  Normal  8-20  

 

 Calcium  7.9 mg/dL  Low  8.6-10.3  

 

 Egfr Non-  117.0    >60  

 

 Egfr   141.6    >60  2









 CBC Auto  2020  [facility]  White Blood  11.4 10^3/uL  High  
3.5-10.8  



 Diff    [address]  Count        



     [place] [address] [phone]          









 Red Blood Count  3.89 10^6/uL  Low  4.18-5.48  

 

 Hemoglobin  13.5 g/dL  Low  14.0-18.0  

 

 Hematocrit  40 %  Low  42-52  

 

 Mean Corpuscular Volume  102 fL  High  80-94  

 

 Mean Corpuscular Hemoglobin  35 pg  High  27-31  

 

 Mean Corpuscular HGB Conc  34 g/dL  Normal  31-36  

 

 Red Cell Distribution Width  14 %  Normal  10-15  

 

 Platelet Count  257 10^3/uL  Normal  150-450  

 

 Mean Platelet Volume  6.7 fL  Low  7.4-10.4  

 

 Abs Neutrophils  10.4 10^3/uL  High  1.5-7.7  

 

 Abs Lymphocytes  0.5 10^3/uL  Low  1.0-4.8  

 

 Abs Monocytes  0.5 10^3/uL  Normal  0-0.8  

 

 Abs Eosinophils  0.0 10^3/uL  Normal  0-0.6  

 

 Abs Basophils  0.0 10^3/uL  Normal  0-0.2  

 

 Abs Nucleated RBC  0.0 10^3/uL      

 

 Granulocyte %  90.9 %      

 

 Lymphocyte %  4.4 %      

 

 Monocyte %  4.5 %      

 

 Eosinophil %  0.0 %      

 

 Basophil %  0.2 %      

 

 Nucleated Red Blood Cells %  0.0      









 Comp Metabolic  2020  [facility]  Sodium  138 mmol/L  Normal  
135-145  



 Panel    [address]          



     [place] [address] [phone]          









 Potassium  3.9 mmol/L  Normal  3.5-5.0  

 

 Chloride  99 mmol/L  Low  101-111  

 

 Co2 Carbon Dioxide  20 mmol/L  Low  22-32  

 

 Anion Gap  19 mmol/L  High  2-11  

 

 Glucose  127 mg/dL  High    

 

 Blood Urea Nitrogen  12 mg/dL  Normal  6-24  

 

 Creatinine  0.76 mg/dL  Normal  0.67-1.17  

 

 BUN/Creatinine Ratio  15.8  Normal  8-20  

 

 Calcium  8.7 mg/dL  Normal  8.6-10.3  

 

 Total Protein  6.8 g/dL  Normal  6.4-8.9  

 

 Albumin  3.7 g/dL  Normal  3.2-5.2  

 

 Globulin  3.1 g/dL  Normal  2-4  

 

 Albumin/Globulin Ratio  1.2  Normal  1-3  

 

 Total Bilirubin  0.90 mg/dL  Normal  0.2-1.0  

 

 Alkaline Phosphatase  105 U/L  High    

 

 Alt  35 U/L  Normal  7-52  

 

 Ast  59 U/L  High  13-39  

 

 Egfr Non-  102.9    >60  

 

 Egfr   124.6    >60  3









 Laboratory test  2020  [facility]  Alcohol  < 10 mg/dL  
Normal  <10  



 finding    [address] [place] [address] [phone]          









 TSH (Thyroid Stim Horm)  0.54 mcIU/mL  Normal  0.34-5.60  

 

 Acetaminophen  < 15 g/mL      4

 

 Salicylate  < 2.50 mg/dL    <30  

 

 Osmolality Serum  294 mOsm/kg  Normal  275-295  









 1  -------------------ADDITIONAL INFORMATION-------------------



   This test was developed and its performance characteristics



   determined by [facility] in a manner consistent with CLIA



   requirements. This test has not been cleared or approved by



   the U.S. Food and Drug Administration.



   Test Performed by:



   [facility]



   [address] [place] [address]



   : Sancho Sifuentes M.D. Ph.D.; CLIA# [id number]

 

 2  *******Because ethnic data is not always readily available,



   this report includes an eGFR for both -Americans and



   non- Americans.****



   The National Kidney Disease Education Program (NKDEP) does



   not endorse the use of the MDRD equation for patients that



   are not between the ages of 18 and 70, are pregnant, have



   extremes of body size, muscle mass, or nutritional status,



   or are non- or non-.



   According to the National Kidney Foundation, irrespective of



   diagnosis, the stage of the disease is based on the level of



   kidney function:



   Stage Description                      GFR(mL/min/1.73 m(2))



   1     Kidney damage with normal or decreased GFR       90



   2     Kidney damage with mild decrease in GFR          60-89



   3     Moderate decrease in GFR                         30-59



   4     Severe decrease in GFR                           15-29



   5     Kidney failure                       <15 (or dialysis)

 

 3  *******Because ethnic data is not always readily available,



   this report includes an eGFR for both -Americans and



   non- Americans.****



   The National Kidney Disease Education Program (NKDEP) does



   not endorse the use of the MDRD equation for patients that



   are not between the ages of 18 and 70, are pregnant, have



   extremes of body size, muscle mass, or nutritional status,



   or are non- or non-.



   According to the National Kidney Foundation, irrespective of



   diagnosis, the stage of the disease is based on the level of



   kidney function:



   Stage Description                      GFR(mL/min/1.73 m(2))



   1     Kidney damage with normal or decreased GFR       90



   2     Kidney damage with mild decrease in GFR          60-89



   3     Moderate decrease in GFR                         30-59



   4     Severe decrease in GFR                           15-29



   5     Kidney failure                       <15 (or dialysis)

 

 4  Therapeutic concentration: <50 ug/mL



   Toxic concentration: >120 ug/mL







Procedures







 Date  Code  Description  Status

 

 2020  20332  ECHO Transthorasic Realtime 2D W Doppler & Color Flow  
Completed



     Hosp  

 

 2018  985532202  Diabetic Retinal Eye Exam  Completed

 

 2017  37216383  Colonoscopy  Completed

 

 2006  92793361  Colonoscopy  Completed







Medical Devices







 Description

 

 No Information Available







Encounters







 Type  Date  Location  Provider  Dx  Diagnosis

 

 Office Visit  2020  [facility]  Gaurav Romero,  M48.02  Spinal 
stenosis,



   10:00a  Medicine - [place] ENGLISH    cervical region









 I10  Essential (primary) hypertension

 

 F10.21  Alcohol dependence, in remission









 Office Visit  2020  2:06p  [facility]  Karey  M48.02  Spinal



     Assoc,yogi Garrido DO    stenosis,



     Hospitalists      cervical region









 R26.0  Ataxic gait









 Office Visit  2020  2:06p  [facility]  Deb  M48.02  Spinal



     Assoc,yogi Lerner PA-C    stenosis,



     Hospitalists      cervical region









 R26.0  Ataxic gait

 

 E83.42  Hypomagnesemia









 Office Visit  2020  [facility]  [place]  M48.02  Spinal



   2:05p  Assoc,yogi Gann M.D.    stenosis,



     Hospitalists      cervical region









 M50.021  Cervical disc disorder at C4-C5 level with myelopathy

 

 R26.0  Ataxic gait

 

 I10  Essential (primary) hypertension









 Office Visit  2020  [facility]  M48.02  Spinal



   2:05p  Assoc,yogi Gann M.D.    stenosis,



     Hospitalists      cervical region









 R26.0  Ataxic gait

 

 F10.21  Alcohol dependence, in remission

 

 I10  Essential (primary) hypertension









 Office Visit  03/15/2020  2:04p  [facility]  R26.0  Ataxic gait



     Assoc,yogi Gann M.D.    



     Hospitalists      









 R53.1  Weakness

 

 R25.1  Tremor, unspecified

 

 I10  Essential (primary) hypertension









 Office Visit  2020  2:03p  [facility]  Gabby Worthington,  R29.6  Repeated 
falls



     Assoc,pc  N.POdalis    



     Hospitalists      









 R26.0  Ataxic gait







Assessments







 Date  Code  Description  Provider

 

 2020  M48.02  Spinal stenosis, cervical region  Gaurav Romero M.D.

 

 2020  I10  Essential (primary) hypertension  Gaurav Romero M.D.

 

 2020  F10.21  Alcohol dependence, in remission  Gaurav Romero M.D.

 

 2020  M48.02  Spinal stenosis, cervical region  Karey Senner, DO

 

 2020  M50.021  Cervical disc disorder at C4-C5 level  Karey Senner, DO



     with myelopathy  

 

 2020  R26.0  Ataxic gait  Karey Senner, DO

 

 2020  R29.6  Repeated falls  Karey Senner, DO

 

 2020  M48.02  Spinal stenosis, cervical region  Karey Senner, DO

 

 2020  R26.0  Ataxic gait  Karey Senner, DO

 

 2020  M48.02  Spinal stenosis, cervical region  Deb Lerner PA-C

 

 2020  I10  Essential (primary) hypertension  Cailin Marks M.D.

 

 2020  R26.0  Ataxic gait  Deb Lerner PA-C

 

 2020  E83.42  Hypomagnesemia  Deb Lerner PA-C

 

 2020  M48.02  Spinal stenosis, cervical region  Lennox Gann M.D.

 

 2020  M50.021  Cervical disc disorder at C4-C5 level  Lennox Gann M.D.



     with myelopathy  

 

 2020  R26.0  Ataxic gait  Lennox Gann M.D.

 

 2020  I10  Essential (primary) hypertension  Lennox Gann M.D.

 

 2020  M48.02  Spinal stenosis, cervical region  Lennox Gann M.D.

 

 2020  R26.0  Ataxic gait  Lennox Gann M.D.

 

 2020  F10.21  Alcohol dependence, in remission  Lennox Gann M.D.

 

 2020  I10  Essential (primary) hypertension  Lennox Gann M.D.

 

 03/15/2020  R26.0  Ataxic gait  Lennox Gann M.D.

 

 03/15/2020  R53.1  Weakness  Lennox Gann M.D.

 

 03/15/2020  R25.1  Tremor, unspecified  Lennox Gann M.D.

 

 03/15/2020  I10  Essential (primary) hypertension  Lennox Gann M.D.

 

 2020  R29.6  Repeated falls  Gabby Worthington N.P.

 

 2020  R26.0  Ataxic gait  Gabby Worthington N.GYPSY.







Plan of Treatment

2020 - Gaurav Romero M.D.M48.02 Spinal stenosis, cervical 
regionComments:Increased weakness and tingling symptoms prompting his recent 
admission.  Neurosurgical evaluation resulted in a recommendation for cervical 
spine surgery for his severe spinal canal stenosis.  Patientdeclined surgical 
intervention at that time and is interested in getting a second opinion.  No 
acutechanges noted but patient advised to proceed with the second opinion as 
soon as possible given the serious findings on his scan.  Patient also advised 
[place] [facility] [place] of his MRI scans on disc for his planned 
neurosurgical consult.I10 Essential (primary) hypertensionComments:No BP 
problems in the hospital per patient.  Occasional home blood pressure checks 
advised.F10.21 Alcohol dependence, in remissionComments:(+) Past history of 
alcohol abuse with ? encephalopathy in 2018.  Patient denies heavy intake now 
but admits he is still drinking daily.



Functional Status







 Description

 

 No Information Available







Mental Status







 Description

 

 No Information Available







Referrals







 Description

 

 No Information Available

## 2020-04-23 NOTE — XMS REPORT
Patient Summary Document

 Created on:2020



Patient:HUMA GARCÍA Unknown

Sex:Male

:1955

External Reference #:[id number]





Demographics







 Address  [address] [place] [address]

 

 Home Phone  [phone]

 

 Preferred Language  unk

 

 Marital Status  Unknown

 

 Taoism Affiliation  Unknown

 

 Race  Unknown

 

 Ethnic Group  Unknown









Author







 Organization  [facility]









Support







 Name  Relationship  Address  Phone

 

 LISA SCHROEDER, Unknown Name  Jane  Unknown Address  [phone]









Care Team Providers







 Name  Role  Phone

 

 Unavailable  Unavailable  Unavailable









Problems

This patient has no known problems.



Allergies, Adverse Reactions, Alerts







 Allergy Name  Allergy  Status  Severity  Reaction(s)  Onset  Inactive  
Treating  Comments



   Type        Date  Date  Clinician  

 

 citalopram  Unknown  Active  Unknown  Reaction      Tegan  



         Unknown  3-30    (Fanta)  



               Soo  



               [id number]  

 

 hydrochlorithia  Unknown  Active  Unknown  Reaction      Tegan  



 zide        Unknown  3-30    (Fanta)  



               Soo  



               [id number]  

 

 lisinopril  Unknown  Active  Unknown  Reaction  0    Tegan  



         Unknown  3-30    (Fanta)  



               Soo  



               [id number]  







Medications







 Ordered  Filled  Start  Stop  Current  Ordering  Indication  Dosage  Frequency
  Signature  Comments  Components



 Medication  Medication  Date  Date  Medication?  Clinician        (SIG)    



 Name  Name                    

 

 No Known  No Known      No  None    None  None      



 Medications  Medications                    



 For This  For This                    



 Patient  Patient                    







Procedures

This patient has no known procedures.



Results

This patient has no known results.

## 2020-04-23 NOTE — XMS REPORT
Continuity of Care Document (CCD)

 Created on:2020



Patient:Tanner Fisher

Sex:Male

:1955

External Reference #:[id number]





Demographics







 Address  [address]



   [place] [address]

 

 Mobile Phone  [phone]

 

 Work Phone  [phone]

 

 Email Address  [email or web address]

 

 Preferred Language  en

 

 Marital Status  Not  or 

 

 Pentecostal Affiliation  Unknown

 

 Race  White

 

 Ethnic Group  Not  or 









Author







 Name  Karey Garrido DO (transmitted by agent of provider Tamara Cedeno)

 

 Address  [address] [place] [address]









Care Team Providers







 Name  Role  Phone

 

 Sahil Jc MD -  Care Team Information   [phone]



 Otolaryngology    

 

 Joseph Busby MD - Urology  Care Team Information   [phone]

 

 Trell Grijalva MD - Orthopaedic  Care Team Information   [phone]
[phone]



 Surgery    

 

 Althea Rodriguez M.D. - Family Medicine  Care Team Information   [phone]
[phone]









Problems







 Active Problems  Provider  Date

 

 Mixed hyperlipidemia  Darwin Miranda M.D.,FACP  Onset: 2008

 

 Essential tremor  Darwin Miranda M.D.,FACP  Onset: 2008

 

 Essential hypertension  Ross Tejeda NP  Onset: 2016

 

 Alcohol-induced cerebellar ataxia  Darwin Miranda M.D.,FACP  Onset: 2018

 

 Chronic alcoholism in remission  Darwin Miranda M.D.,FACP  Onset: 2018

 

 Ex-smoker  Darwin Miranda M.D.,FACP  Onset: 2018

 

 Wernicke's disease  Saman May M.D.  Onset: 2018

 

 Spinal cord injury without spinal bone  Saman May M.D.  Onset: 2018



 injury    

 

 Static encephalopathy  Saman May M.D.  Onset: 2018

 

 Late effect of intracranial injury  Saman May M.D.  Onset: 2018



 without skull fracture    

 

 Idiopathic peripheral neuropathy  Saman May M.D.  Onset: 2018

 

 Nondependent alcohol abuse in  Saman May M.D.  Onset: 2018



 remission    

 

 Cervical spondylosis with myelopathy  Han Kingsley M.D.  Onset: 2018







Social History







 Type  Date  Description  Comments

 

 Birth Sex    Unknown  

 

 Tobacco Use  Start: Unknown End:  Former Cigarette Smoker  started age 24, quit



       age 54 for 8 years,



       then smoked again



       for one year total



       31 years 1 PPD

 

 Smoking Status  Reviewed: 19  Former Cigarette Smoker  started age 24, 
quit



       age 54 for 8 years,



       then smoked again



       for one year total



       31 years 1 PPD

 

 ETOH Use    Denies alcohol use  

 

 ETOH Use    consumes 5-6 beers per  



     week  

 

 Recreational Drug Use    Denies Drug Use  

 

 Tobacco Use  Start: Unknown End:  Patient is a former  quit 2009



   Unknown  smoker  







Allergies, Adverse Reactions, Alerts







 Active Allergies  Reaction  Severity  Comments  Date

 

 Celexa      worse depression  2008

 

 Lisinopril      cough  2008

 

 HCTZ      hyperglycemia  2008







Medications







 Active Medications  SIG  Qnty  Indications  Ordering  Date



         Provider  

 

 Shingrix  0.5 milliliters  1units  Z00.01  Althea Rodriguez MD  2019



         50mcg/0.5ML  intramuscular now        



 Suspension Rec  and 1-2 months later        



   repeat        

 

 Bupropion  Take 1 Tablet By  90tabs    Rosetta Conway,  2019



 Hydrochloride ER  Mouth Every Day      M.DOdalis  



 (SR)          



     100mg Tablets ER          



 12HR          



           

 

 Metoprolol Succinate  take 1 tablet by  90tabs  I10  Roxann Nolasco,  2016



 ER  mouth every day      M.DOdalis, FACP  



   50mg Tablets ER          



 24HR          



           

 

 Simvastatin  Take 1 Tablet By  90tabs    Cherelle  2013



            20mg  Mouth Every Day In      AMADOR Dennis  



 Tablets  The Evening        



           

 

 Omeprazole  take 1 capsule by  90caps    Althea Rodriguez MD  2011



           20mg  mouth every day        



 Capsules DR Avitia  take 1 tablet by  90tabs  I10  Cherelle  2011



       160mg Tablets  mouth every day      AMADOR Dennis  



           

 

 Fish Oil + D3  1 capsule daily      Unknown  



           



 1200-1000mg-Unit          



 Capsules          



           

 

 Saw Palmetto  1 po hs  120caps    Unknown  



             450mg          



 Capsules          



           

 

 Centrum Silver Ultra  1 po qd      Unknown  



 Mens          



      Tablets          



           

 

 Ascorbic Acid  1 by mouth every day      Unknown  



              500mg          



 Tablets          



           

 

 Magnesium Oxide  2 tab by mouth every      Unknown  



                400mg  day        



 Tablets          



           







Immunizations







 CPT Code  Status  Date  Vaccine  Reaction  Lot #

 

 [id number]  Given  2019  Pneumococcal Conjugate    [id number]



       Vaccine 13 Valent For    



       Intramuscular Use    

 

 [id number]  Given  01/15/2018  Influenza Virus Vaccine,  no immedite reaction  [id number]



       Quadrivalent, Split,  noted .. hh  



       Preservative Free    

 

 34717  Given  2016  Zoster (Zostavax)    [id number]

 

 13368  Given  2016  Influenza Virus Vaccine,    [id number]



       Quadrivalent, Split,    



       Preservative Free    

 

 28243  Given  2016  Tdap -    [id number]



       Tetanus/Diptheria/Acellular    



       Pertussis    

 

   Given  2012  Fluzone Vaccine    [id number]

 

 10623  Given  2011  Influenza Virus 3Yrs & Over    [id number]







Vital Signs







 Date  Vital  Result  Comment

 

 2019 11:20am  Height  65 inches  5'5"









 Weight  155.00 lb  shoes

 

 Heart Rate  76 /min  

 

 BP Systolic  148 mmHg  

 

 BP Diastolic  89 mmHg  

 

 Body Temperature  98.6 F  

 

 O2 % BldC Oximetry  98 %  

 

 BMI (Body Mass Index)  25.8 kg/m2  









 2018  9:06am  Height  65 inches  5'5"









 Weight  150.25 lb  

 

 Heart Rate  74 /min  

 

 BP Systolic Sitting  128 mmHg  

 

 BP Diastolic Sitting  72 mmHg  

 

 Pain Level  0  

 

 BMI (Body Mass Index)  25.0 kg/m2  







Results







 Test  Acquired Date  Facility  Test  Result  H/L  Range  Note

 

 Laboratory test  2020  [facility]  Ammonia  49 mcmol/L  
Normal  16-53  



 finding    [address] [place] [address] [phone]          









 Vitamin B1 (Whole Blood)  127 nmol/L      1









 Basic Metabolic  2020  [facility]  Sodium  137 mmol/L  Normal
  135-145  



 Panel    [address] [place] [address] [phone]          









 Potassium  4.1 mmol/L  Normal  3.5-5.0  

 

 Chloride  104 mmol/L  Normal  101-111  

 

 Co2 Carbon Dioxide  20 mmol/L  Low  22-32  

 

 Anion Gap  13 mmol/L  High  2-11  

 

 Glucose  83 mg/dL  Normal    

 

 Blood Urea Nitrogen  12 mg/dL  Normal  6-24  

 

 Creatinine  0.68 mg/dL  Normal  0.67-1.17  

 

 BUN/Creatinine Ratio  17.6  Normal  8-20  

 

 Calcium  7.9 mg/dL  Low  8.6-10.3  

 

 Egfr Non-  117.0    >60  

 

 Egfr   141.6    >60  2









 CBC Auto  2020  [facility]  White Blood  11.4 10^3/uL  High  
3.5-10.8  



 Diff    [address]  Count        



     [place] [address] [phone]          









 Red Blood Count  3.89 10^6/uL  Low  4.18-5.48  

 

 Hemoglobin  13.5 g/dL  Low  14.0-18.0  

 

 Hematocrit  40 %  Low  42-52  

 

 Mean Corpuscular Volume  102 fL  High  80-94  

 

 Mean Corpuscular Hemoglobin  35 pg  High  27-31  

 

 Mean Corpuscular HGB Conc  34 g/dL  Normal  31-36  

 

 Red Cell Distribution Width  14 %  Normal  10-15  

 

 Platelet Count  257 10^3/uL  Normal  150-450  

 

 Mean Platelet Volume  6.7 fL  Low  7.4-10.4  

 

 Abs Neutrophils  10.4 10^3/uL  High  1.5-7.7  

 

 Abs Lymphocytes  0.5 10^3/uL  Low  1.0-4.8  

 

 Abs Monocytes  0.5 10^3/uL  Normal  0-0.8  

 

 Abs Eosinophils  0.0 10^3/uL  Normal  0-0.6  

 

 Abs Basophils  0.0 10^3/uL  Normal  0-0.2  

 

 Abs Nucleated RBC  0.0 10^3/uL      

 

 Granulocyte %  90.9 %      

 

 Lymphocyte %  4.4 %      

 

 Monocyte %  4.5 %      

 

 Eosinophil %  0.0 %      

 

 Basophil %  0.2 %      

 

 Nucleated Red Blood Cells %  0.0      









 Comp Metabolic  2020  [facility]  Sodium  138 mmol/L  Normal  
135-145  



 Panel    [address]          



     [place] [address] [phone]          









 Potassium  3.9 mmol/L  Normal  3.5-5.0  

 

 Chloride  99 mmol/L  Low  101-111  

 

 Co2 Carbon Dioxide  20 mmol/L  Low  22-32  

 

 Anion Gap  19 mmol/L  High  2-11  

 

 Glucose  127 mg/dL  High    

 

 Blood Urea Nitrogen  12 mg/dL  Normal  6-24  

 

 Creatinine  0.76 mg/dL  Normal  0.67-1.17  

 

 BUN/Creatinine Ratio  15.8  Normal  8-20  

 

 Calcium  8.7 mg/dL  Normal  8.6-10.3  

 

 Total Protein  6.8 g/dL  Normal  6.4-8.9  

 

 Albumin  3.7 g/dL  Normal  3.2-5.2  

 

 Globulin  3.1 g/dL  Normal  2-4  

 

 Albumin/Globulin Ratio  1.2  Normal  1-3  

 

 Total Bilirubin  0.90 mg/dL  Normal  0.2-1.0  

 

 Alkaline Phosphatase  105 U/L  High    

 

 Alt  35 U/L  Normal  7-52  

 

 Ast  59 U/L  High  13-39  

 

 Egfr Non-  102.9    >60  

 

 Egfr   124.6    >60  3









 Laboratory test  2020  [facility]  Alcohol  < 10 mg/dL  
Normal  <10  



 finding    [address] [place] [address] [phone]          









 TSH (Thyroid Stim Horm)  0.54 mcIU/mL  Normal  0.34-5.60  

 

 Acetaminophen  < 15 g/mL      4

 

 Salicylate  < 2.50 mg/dL    <30  

 

 Osmolality Serum  294 mOsm/kg  Normal  275-295  









 1  -------------------ADDITIONAL INFORMATION-------------------



   This test was developed and its performance characteristics



   determined by [facility] in a manner consistent with CLIA



   requirements. This test has not been cleared or approved by



   the U.S. Food and Drug Administration.



   Test Performed by:



   [facility]



   [address] [place] [address]



   : Sancho Sifuentes M.D. Ph.D.; CLIA# [id number]

 

 2  *******Because ethnic data is not always readily available,



   this report includes an eGFR for both -Americans and



   non- Americans.****



   The National Kidney Disease Education Program (NKDEP) does



   not endorse the use of the MDRD equation for patients that



   are not between the ages of 18 and 70, are pregnant, have



   extremes of body size, muscle mass, or nutritional status,



   or are non- or non-.



   According to the National Kidney Foundation, irrespective of



   diagnosis, the stage of the disease is based on the level of



   kidney function:



   Stage Description                      GFR(mL/min/1.73 m(2))



   1     Kidney damage with normal or decreased GFR       90



   2     Kidney damage with mild decrease in GFR          60-89



   3     Moderate decrease in GFR                         30-59



   4     Severe decrease in GFR                           15-29



   5     Kidney failure                       <15 (or dialysis)

 

 3  *******Because ethnic data is not always readily available,



   this report includes an eGFR for both -Americans and



   non- Americans.****



   The National Kidney Disease Education Program (NKDEP) does



   not endorse the use of the MDRD equation for patients that



   are not between the ages of 18 and 70, are pregnant, have



   extremes of body size, muscle mass, or nutritional status,



   or are non- or non-.



   According to the National Kidney Foundation, irrespective of



   diagnosis, the stage of the disease is based on the level of



   kidney function:



   Stage Description                      GFR(mL/min/1.73 m(2))



   1     Kidney damage with normal or decreased GFR       90



   2     Kidney damage with mild decrease in GFR          60-89



   3     Moderate decrease in GFR                         30-59



   4     Severe decrease in GFR                           15-29



   5     Kidney failure                       <15 (or dialysis)

 

 4  Therapeutic concentration: <50 ug/mL



   Toxic concentration: >120 ug/mL







Procedures







 Date  Code  Description  Status

 

 2020  24663  ECHO Transthorasic Realtime 2D W Doppler & Color Flow  
Completed



     Hosp  

 

 2018  [id number]  Diabetic Retinal Eye Exam  Completed

 

 2017  89898598  Colonoscopy  Completed

 

 2006  21100130  Colonoscopy  Completed







Medical Devices







 Description

 

 No Information Available







Encounters







 Type  Date  Location  Provider  Dx  Diagnosis

 

 Office Visit  2020  [facility]  Karey Garrido,  M48.02  Spinal 
stenosis,



   2:06p  yogi Walsh DO    cervical region



     Hospitalists      









 R26.0  Ataxic gait









 Office Visit  2020  2:06p  [facility]  Deb  M48.02  Spinal



     Assocyogi PA-C    stenosis,



     Hospitalists      cervical region









 R26.0  Ataxic gait

 

 E83.42  Hypomagnesemia









 Office Visit  2020  [facility]  M48.02  Spinal



   2:05p  yogi Walsh M.D.    stenosis,



     Hospitalists      cervical region









 M50.021  Cervical disc disorder at C4-C5 level with myelopathy

 

 R26.0  Ataxic gait

 

 I10  Essential (primary) hypertension









 Office Visit  2020  [facility]  M48.02  Spinal



   2:05p  Assoc,yogi Gann M.D.    stenosis,



     Hospitalists      cervical region









 R26.0  Ataxic gait

 

 F10.21  Alcohol dependence, in remission

 

 I10  Essential (primary) hypertension









 Office Visit  03/15/2020  2:04p  [facility]  [place]  R26.0  Ataxic gait



     Assoc,yogi Gann M.D.    



     Hospitalists      









 R53.1  Weakness

 

 R25.1  Tremor, unspecified

 

 I10  Essential (primary) hypertension









 Office Visit  2020  2:03p  [facility]  Gabby Ander,  R29.6  Repeated 
falls



     Assoc,pc  N.P.    



     Hospitalists      









 R26.0  Ataxic gait







Assessments







 Date  Code  Description  Provider

 

 2020  M48.02  Spinal stenosis, cervical region  Karey Senner, DO

 

 2020  M50.021  Cervical disc disorder at C4-C5 level  Karey Garrido, DO



     with myelopathy  

 

 2020  R26.0  Ataxic gait  Karey Senner, DO

 

 2020  R29.6  Repeated falls  Karey Senner, DO

 

 2020  M48.02  Spinal stenosis, cervical region  Karey Senner, DO

 

 2020  R26.0  Ataxic gait  Karey Senner, DO

 

 2020  M48.02  Spinal stenosis, cervical region  Deb Lerner PA-C

 

 2020  I10  Essential (primary) hypertension  Cailin Marks M.D.

 

 2020  R26.0  Ataxic gait  Deb Lerner PA-C

 

 2020  E83.42  Hypomagnesemia  Deb Lerner PA-C

 

 2020  M48.02  Spinal stenosis, cervical region  Lennox Gann M.D.

 

 2020  M50.021  Cervical disc disorder at C4-C5 level  Lennox Gann M.D.



     with myelopathy  

 

 2020  R26.0  Ataxic gait  Lennox Gann M.D.

 

 2020  I10  Essential (primary) hypertension  Lennox Gann M.D.

 

 2020  M48.02  Spinal stenosis, cervical region  Lennox Gann M.D.

 

 2020  R26.0  Ataxic gait  Lennox Gann M.D.

 

 2020  F10.21  Alcohol dependence, in remission  Lennox Gann M.D.

 

 2020  I10  Essential (primary) hypertension  Lennox Gann M.D.

 

 03/15/2020  R26.0  Ataxic gait  Lennox Gann M.D.

 

 03/15/2020  R53.1  Weakness  Lennox Gann M.D.

 

 03/15/2020  R25.1  Tremor, unspecified  Lennox Gann M.D.

 

 03/15/2020  I10  Essential (primary) hypertension  Lennox Gann M.D.

 

 2020  R29.6  Repeated falls  Gabby Worthington, N.P.

 

 2020  R26.0  Ataxic gait  Gabby Worthington, N.P.







Plan of Treatment

2018 - Saman May M.D.G60.8 Other hereditary and idiopathic 
neuropathiesFollow up:Follow up 1 week after NS drmnrmytpouW50.11 Alcohol abuse
, in vlqsxlvugE69.5x9S Traumatic subdural hemorrhage with loss of consciousness 
of unspecified duration, opaacudS47.2 Degeneration of nervous system due to 
ovwstbpL21.129D Central cord syndrome at unspecified level of cervical spinal 
cord, subsequent encounterReferral:Han Kingsley M.D., Surgery,
TooxfsmfqmtrH58.2 Paresthesia of skin



Functional Status







 Description

 

 No Information Available







Mental Status







 Description

 

 No Information Available







Referrals







 Description

 

 No Information Available

## 2020-04-23 NOTE — XMS REPORT
Patient Summary Document

 Created on:2020



Patient:HUMA GARCÍA Unknown

Sex:Male

:1955

External Reference #:[id number]





Demographics







 Address  [address] [place] [address]

 

 Home Phone  [phone]

 

 Preferred Language  unk

 

 Marital Status  Unknown

 

 Druze Affiliation  Unknown

 

 Race  Unknown

 

 Ethnic Group  Unknown









Author







 Organization  [facility]









Support







 Name  Relationship  Address  Phone

 

 LISA SCHROEDER, Unknown Name  Jane  Unknown Address  [phone]









Care Team Providers







 Name  Role  Phone

 

 Unavailable  Unavailable  Unavailable









Problems







 Condition  Condition  Condition  Status  Onset  Resolution  Last  Treating  
Comments



 Name  Details  Category    Date  Date  Treatment  Clinician  



             Date    

 

 Wernicke's  Wernicke's  Diagnosis  Active        Magan  



 encephalopa  encephalopa      3-23      Anguish  



 thy  thy            [id number]  

 

 Unspecified  Unspecified  Diagnosis  Active        Magan  



 cord  cord      3-23      Anguish  



 compression  compression            [id number]  

 

 Neuro  anxiety  Neuro/Emot  Active        Tegan  



   present  ion    3-24      (Fanta)  



         09:30:      Vann  



         00      [id number]  

 

 Neuro  depressive  Neuro/Emot  Active        Tegan  



   feelings  ion    3-24      (Fanta)  



   present      09:30:      Vann  



         00      [id number]  







Allergies, Adverse Reactions, Alerts







 Allergy Name  Allergy  Status  Severity  Reaction(s)  Onset  Inactive  
Treating  Comments



   Type        Date  Date  Clinician  

 

 citalopram  Unknown  Active  Unknown  Reaction      Tegan  



         Unknown  3-30    (Fanta)  



               Soo  



               [id number]  

 

 hydrochlorithia  Unknown  Active  Unknown  Reaction      Tegan  



 zide        Unknown  3-30    (Fanta)  



               Soo  



               [id number]  

 

 lisinopril  Unknown  Active  Unknown  Reaction      Tegan  



         Unknown  3-30    (Fanta)  



               Soo  



               [id number]  







Medications







 Ordered  Filled  Start  Stop  Current  Ordering  Indication  Dosage  Frequency
  Signature  Comments  Components



 Medication  Medication  Date  Date  Medication?  Clinician        (SIG)    



 Name  Name                    

 

 multivitami  multivitami      Yes  John    Unknown  Unknown      



 n capsule  n capsule  3-24      Apryl RODRIGUEZ            

 

 valsartan  valsartan  -0    Yes  John    Unknown  Unknown      



 160 mg  160 mg  3-24      Apryl RODRIGUEZ            



 tablet  tablet                    

 

 buPROPion  buPROPion  -0    Yes  John    Unknown  Unknown      



 HCL   HCL   3-24      Apryl RODRIGUEZ            



 mg  mg                    



 tablet,12  tablet,12                    



 hr  hr                    



 sustained-r  sustained-r                    



 elease  elease                    

 

 ascorbic  ascorbic  -0    Yes  John    Unknown  Unknown      



 acid  acid  3-24      Apryl RODRIGUEZ            



 (vitamin C)  (vitamin C)                    



 500 mg  500 mg                    



 capsule  capsule                    

 

 omeprazole  omeprazole  -0    Yes  John    Unknown  Unknown      



 20 mg  20 mg  3-24      MD,Apryl            



 tablet,marlena  tablet,marlena                    



 yed release  yed release                    

 

 omega  omega  2020-0    Yes  John    Unknown  Unknown      



 3-dha-epa-f  3-dha-epa-f  3-24      MD,Apryl            



 rosaura oil  rosaura oil                    



 1,000 mg  1,000 mg                    



 (120 mg-180  (120 mg-180                    



 mg) capsule  mg) capsule                    

 

 metoprolol  metoprolol  -0    Yes  John    Unknown  Unknown      



 succinate  succinate  3-24      MD,Apryl            



 ER 50 mg  ER 50 mg                    



 tablet,exte  tablet,exte                    



 nded  nded                    



 release 24  release 24                    



 hr  hr                    

 

 simvastatin  simvastatin  -0    Yes  John    Unknown  Unknown      



 20 mg  20 mg  3-24      MD,Apryl            



 tablet  tablet                    







Vital Signs







 Vital Name  Observation Time  Observation Value  Comments

 

 SYSTOLIC mm[Hg]  2020 18:11:04  128 mm[Hg] mm[Hg]  Method: Sit

 

 DIASTOLIC mm[Hg]  2020 18:11:04  64 mm[Hg] mm[Hg]  Method: Sit

 

 PULSE  2020 18:11:04  87 /min /min  

 

 RESP RATE  2020 18:11:04  15 /min /min  

 

 TEMP  2020 18:11:04  98.9 [degF]  







Procedures

This patient has no known procedures.



Results

This patient has no known results.

## 2020-04-23 NOTE — XMS REPORT
Continuity of Care Document (CCD)

 Created on:2020



Patient:Tanner Fisher

Sex:Male

:1955

External Reference #:[id number]





Demographics







 Address  [address]



   [place] [address]

 

 Mobile Phone  [phone]

 

 Work Phone  [phone]

 

 Email Address  [email or web address]

 

 Preferred Language  en

 

 Marital Status  Not  or 

 

 Ep[employer] Affiliation  Unknown

 

 Race  White

 

 Ethnic Group  Not  or 









Author







 Name  Andie Rodriguez MD (transmitted by agent of provider )

 

 Address  [address]



   [place] [address]









Care Team Providers







 Name  Role  Phone

 

 Sahil Jc MD -  Care Team Information   [phone]



 Otolaryngology    

 

 Joseph Busby MD - Urology  Care Team Information   [phone]

 

 Trell Grijalva MD - Orthopaedic  Care Team Information   [phone]
[phone]



 Surgery    

 

 Althea Rodriguez M.D. - Family Medicine  Care Team Information   [phone]
[phone]









Problems







 Active Problems  Provider  Date

 

 Mixed hyperlipidemia  Darwin Miranda M.D.,FACP  Onset: 2008

 

 Essential tremor  Darwin Miranda M.D.,FACP  Onset: 2008

 

 Essential hypertension  Ross Tejeda NP  Onset: 2016

 

 Alcohol-induced cerebellar ataxia  Darwin Miranda M.D.,FACP  Onset: 2018

 

 Chronic alcoholism in remission  Darwin Miranda M.D.,FACP  Onset: 2018

 

 Ex-smoker  Darwin Miranda M.D.,FACP  Onset: 2018

 

 Wernicke's disease  Saman May M.D.  Onset: 2018

 

 Spinal cord injury without spinal bone  Saman May M.D.  Onset: 2018



 injury    

 

 Static encephalopathy  Saman May M.D.  Onset: 2018

 

 Late effect of intracranial injury  Saman May M.D.  Onset: 2018



 without skull fracture    

 

 Idiopathic peripheral neuropathy  Saman May M.D.  Onset: 2018

 

 Nondependent alcohol abuse in  Saman May M.D.  Onset: 2018



 remission    

 

 Cervical spondylosis with myelopathy  Han Kingsley M.D.  Onset: 2018







Social History







 Type  Date  Description  Comments

 

 Birth Sex    Unknown  

 

 Tobacco Use  Start: Unknown End:  Former Cigarette Smoker  started age 24, quit



       age 54 for 8 years,



       then smoked again



       for one year total



       31 years 1 PPD

 

 Smoking Status  Reviewed: 19  Former Cigarette Smoker  started age 24, 
quit



       age 54 for 8 years,



       then smoked again



       for one year total



       31 years 1 PPD

 

 ETOH Use    Denies alcohol use  

 

 ETOH Use    consumes 5-6 beers per  



     week  

 

 Recreational Drug Use    Denies Drug Use  

 

 Tobacco Use  Start: Unknown End:  Patient is a former  quit 2009



   Unknown  smoker  







Allergies, Adverse Reactions, Alerts







 Active Allergies  Reaction  Severity  Comments  Date

 

 Celexa      worse depression  2008

 

 Lisinopril      cough  2008

 

 HCTZ      hyperglycemia  2008







Medications







 Active Medications  SIG  Qnty  Indications  Ordering  Date



         Provider  

 

 Shingrix  0.5 milliliters  1units  Z00.01  Althea Rodriguez MD  2019



         50mcg/0.5ML  intramuscular now        



 Suspension Rec  and 1-2 months later        



   repeat        

 

 Bupropion  Take 1 Tablet By  90tabs    Rosetta Conway,  2019



 Hydrochloride ER  Mouth Every Day      M.DOdalis  



 (SR)          



     100mg Tablets ER          



 12HR          



           

 

 Metoprolol Succinate  take 1 tablet by  90tabs  I10  Roxann Nolasco,  2016



 ER  mouth every day      M.DOdalis, FACP  



   50mg Tablets ER          



 24HR          



           

 

 Simvastatin  Take 1 Tablet By  90tabs    Cherelle  2013



            20mg  Mouth Every Day In      AMADOR Dennis  



 Tablets  The Evening        



           

 

 Omeprazole  take 1 capsule by  90caps    Althea Rodriguez MD  2011



           20mg  mouth every day        



 Capsules DR Avitia  take 1 tablet by  90tabs  I10  Cherelle  2011



       160mg Tablets  mouth every day      AMADOR Dennis  



           

 

 Fish Oil + D3  1 capsule daily      Unknown  



           



 1200-1000mg-Unit          



 Capsules          



           

 

 Saw Palmetto  1 po hs  120caps    Unknown  



             450mg          



 Capsules          



           

 

 Centrum Silver Ultra  1 po qd      Unknown  



 Mens          



      Tablets          



           

 

 Ascorbic Acid  1 by mouth every day      Unknown  



              500mg          



 Tablets          



           

 

 Magnesium Oxide  2 tab by mouth every      Unknown  



                400mg  day        



 Tablets          



           







Immunizations







 CPT Code  Status  Date  Vaccine  Reaction  Lot #

 

 [id number]  Given  2019  Pneumococcal Conjugate    [id number]



       Vaccine 13 Valent For    



       Intramuscular Use    

 

 55416  Given  01/15/2018  Influenza Virus Vaccine,  no immedite reaction  [id number]



       Quadrivalent, Split,  noted .. hh  



       Preservative Free    

 

 93438  Given  2016  Zoster (Zostavax)    [id number]

 

 [id number]  Given  2016  Influenza Virus Vaccine,    [id number]



       Quadrivalent, Split,    



       Preservative Free    

 

 00314  Given  2016  Tdap -    [id number]



       Tetanus/Diptheria/Acellular    



       Pertussis    

 

   Given  2012  Fluzone Vaccine    [id number]

 

 28463  Given  2011  Influenza Virus 3Yrs & Over    [id number]







Vital Signs







 Date  Vital  Result  Comment

 

 2019 11:20am  Height  65 inches  5'5"









 Weight  155.00 lb  shoes

 

 Heart Rate  76 /min  

 

 BP Systolic  148 mmHg  

 

 BP Diastolic  89 mmHg  

 

 Body Temperature  98.6 F  

 

 O2 % BldC Oximetry  98 %  

 

 BMI (Body Mass Index)  25.8 kg/m2  









 2018  9:06am  Height  65 inches  5'5"









 Weight  150.25 lb  

 

 Heart Rate  74 /min  

 

 BP Systolic Sitting  128 mmHg  

 

 BP Diastolic Sitting  72 mmHg  

 

 Pain Level  0  

 

 BMI (Body Mass Index)  25.0 kg/m2  







Results







 Test  Acquired Date  Facility  Test  Result  H/L  Range  Note

 

 Laboratory test  2020  [facility]  Ammonia  49 mcmol/L  
Normal  16-53  



 finding    [address] [place] [address] [phone]          









 Vitamin B1 (Whole Blood)  127 nmol/L      1









 Basic Metabolic  2020  [facility]  Sodium  137 mmol/L  Normal
  135-145  



 Panel    [address] [place] [address] [phone]          









 Potassium  4.1 mmol/L  Normal  3.5-5.0  

 

 Chloride  104 mmol/L  Normal  101-111  

 

 Co2 Carbon Dioxide  20 mmol/L  Low  22-32  

 

 Anion Gap  13 mmol/L  High  2-11  

 

 Glucose  83 mg/dL  Normal    

 

 Blood Urea Nitrogen  12 mg/dL  Normal  6-24  

 

 Creatinine  0.68 mg/dL  Normal  0.67-1.17  

 

 BUN/Creatinine Ratio  17.6  Normal  8-20  

 

 Calcium  7.9 mg/dL  Low  8.6-10.3  

 

 Egfr Non-  117.0    >60  

 

 Egfr   141.6    >60  2









 CBC Auto  2020  [facility]  White Blood  11.4 10^3/uL  High  
3.5-10.8  



 Diff    [address]  Count        



     [place] [address] [phone]          









 Red Blood Count  3.89 10^6/uL  Low  4.18-5.48  

 

 Hemoglobin  13.5 g/dL  Low  14.0-18.0  

 

 Hematocrit  40 %  Low  42-52  

 

 Mean Corpuscular Volume  102 fL  High  80-94  

 

 Mean Corpuscular Hemoglobin  35 pg  High  27-31  

 

 Mean Corpuscular HGB Conc  34 g/dL  Normal  31-36  

 

 Red Cell Distribution Width  14 %  Normal  10-15  

 

 Platelet Count  257 10^3/uL  Normal  150-450  

 

 Mean Platelet Volume  6.7 fL  Low  7.4-10.4  

 

 Abs Neutrophils  10.4 10^3/uL  High  1.5-7.7  

 

 Abs Lymphocytes  0.5 10^3/uL  Low  1.0-4.8  

 

 Abs Monocytes  0.5 10^3/uL  Normal  0-0.8  

 

 Abs Eosinophils  0.0 10^3/uL  Normal  0-0.6  

 

 Abs Basophils  0.0 10^3/uL  Normal  0-0.2  

 

 Abs Nucleated RBC  0.0 10^3/uL      

 

 Granulocyte %  90.9 %      

 

 Lymphocyte %  4.4 %      

 

 Monocyte %  4.5 %      

 

 Eosinophil %  0.0 %      

 

 Basophil %  0.2 %      

 

 Nucleated Red Blood Cells %  0.0      









 Comp Metabolic  2020  [facility]  Sodium  138 mmol/L  Normal  
135-145  



 Panel    [address]          



     [place] [address] [phone]          









 Potassium  3.9 mmol/L  Normal  3.5-5.0  

 

 Chloride  99 mmol/L  Low  101-111  

 

 Co2 Carbon Dioxide  20 mmol/L  Low  22-32  

 

 Anion Gap  19 mmol/L  High  2-11  

 

 Glucose  127 mg/dL  High    

 

 Blood Urea Nitrogen  12 mg/dL  Normal  6-24  

 

 Creatinine  0.76 mg/dL  Normal  0.67-1.17  

 

 BUN/Creatinine Ratio  15.8  Normal  8-20  

 

 Calcium  8.7 mg/dL  Normal  8.6-10.3  

 

 Total Protein  6.8 g/dL  Normal  6.4-8.9  

 

 Albumin  3.7 g/dL  Normal  3.2-5.2  

 

 Globulin  3.1 g/dL  Normal  2-4  

 

 Albumin/Globulin Ratio  1.2  Normal  1-3  

 

 Total Bilirubin  0.90 mg/dL  Normal  0.2-1.0  

 

 Alkaline Phosphatase  105 U/L  High    

 

 Alt  35 U/L  Normal  7-52  

 

 Ast  59 U/L  High  13-39  

 

 Egfr Non-  102.9    >60  

 

 Egfr   124.6    >60  3









 Laboratory test  2020  [facility]  Alcohol  < 10 mg/dL  
Normal  <10  



 finding    [address] [place] [address] [phone]          









 TSH (Thyroid Stim Horm)  0.54 mcIU/mL  Normal  0.34-5.60  

 

 Acetaminophen  < 15 g/mL      4

 

 Salicylate  < 2.50 mg/dL    <30  

 

 Osmolality Serum  294 mOsm/kg  Normal  275-295  









 1  -------------------ADDITIONAL INFORMATION-------------------



   This test was developed and its performance characteristics



   determined by [facility] in a manner consistent with CLIA



   requirements. This test has not been cleared or approved by



   the U.S. Food and Drug Administration.



   Test Performed by:



   [facility]



   [address] [place] [address]



   : Sancho Sifuentes M.D. Ph.D.; CLIA# [id number]

 

 2  *******Because ethnic data is not always readily available,



   this report includes an eGFR for both -Americans and



   non- Americans.****



   The National Kidney Disease Education Program (NKDEP) does



   not endorse the use of the MDRD equation for patients that



   are not between the ages of 18 and 70, are pregnant, have



   extremes of body size, muscle mass, or nutritional status,



   or are non- or non-.



   According to the National Kidney Foundation, irrespective of



   diagnosis, the stage of the disease is based on the level of



   kidney function:



   Stage Description                      GFR(mL/min/1.73 m(2))



   1     Kidney damage with normal or decreased GFR       90



   2     Kidney damage with mild decrease in GFR          60-89



   3     Moderate decrease in GFR                         30-59



   4     Severe decrease in GFR                           15-29



   5     Kidney failure                       <15 (or dialysis)

 

 3  *******Because ethnic data is not always readily available,



   this report includes an eGFR for both -Americans and



   non- Americans.****



   The National Kidney Disease Education Program (NKDEP) does



   not endorse the use of the MDRD equation for patients that



   are not between the ages of 18 and 70, are pregnant, have



   extremes of body size, muscle mass, or nutritional status,



   or are non- or non-.



   According to the National Kidney Foundation, irrespective of



   diagnosis, the stage of the disease is based on the level of



   kidney function:



   Stage Description                      GFR(mL/min/1.73 m(2))



   1     Kidney damage with normal or decreased GFR       90



   2     Kidney damage with mild decrease in GFR          60-89



   3     Moderate decrease in GFR                         30-59



   4     Severe decrease in GFR                           15-29



   5     Kidney failure                       <15 (or dialysis)

 

 4  Therapeutic concentration: <50 ug/mL



   Toxic concentration: >120 ug/mL







Procedures







 Date  Code  Description  Status

 

 2020  30172  ECHO Transthorasic Realtime 2D W Doppler & Color Flow  
Completed



     Hosp  

 

 2018  977239463  Diabetic Retinal Eye Exam  Completed

 

 2017  31972626  Colonoscopy  Completed

 

 2006  33152184  Colonoscopy  Completed







[employer] Devices







 Description

 

 No Information Available







Encounters







 Type  Date  Location  Provider  Dx  Diagnosis

 

 Office Visit  2020  [facility]  Gaurav Romero,  M48.02  Spinal 
stenosis,



   10:00a  Medicine - [facility] ENGLISH    cervical region









 I10  Essential (primary) hypertension

 

 F10.21  Alcohol dependence, in remission









 Office Visit  2020  2:07p  [facility]  Karey  M48.02  Spinal



     Assoc,yogi Garrido, DO    stenosis,



     Hospitalists      cervical region









 M50.021  Cervical disc disorder at C4-C5 level with myelopathy

 

 R26.0  Ataxic gait

 

 R29.6  Repeated falls









 Office Visit  2020  2:06p  [facility]  Karey  M48.02  Spinal



     Assoc,yogi Garrido, DO    stenosis,



     Hospitalists      cervical region









 R26.0  Ataxic gait









 Office Visit  2020  Neurosurgery  Vassilios  M47.12  Other



   7:00a  [facility] MD    spondylosis with



           myelopathy,



           cervical region









 M48.02  Spinal stenosis, cervical region









 Office Visit  2020  2:06p  [facility]  Deb  M48.02  Spinal



     Assoc,yogi Lerner PA-C    stenosis,



     Hospitalists      cervical region









 R26.0  Ataxic gait

 

 E83.42  Hypomagnesemia









 Office Visit  2020  Neurosurgery  Vassilios  M47.12  Other



   7:00a  [facility] MD    spondylosis with



           myelopathy,



           cervical region









 M48.02  Spinal stenosis, cervical region









 Office Visit  2020  [facility]  M48.02  Spinal



   2:05p  Assyogi starr M.D.    stenosis,



     Hospitalists      cervical region









 M50.021  Cervical disc disorder at C4-C5 level with myelopathy

 

 R26.0  Ataxic gait

 

 I10  Essential (primary) hypertension









 Office Visit  2020  [facility]  M48.02  Spinal



   2:05p  Assyogi starr M.D.    stenosis,



     Hospitalists      cervical region









 R26.0  Ataxic gait

 

 F10.21  Alcohol dependence, in remission

 

 I10  Essential (primary) hypertension









 Office Visit  03/15/2020  2:04p  [facility]  R26.0  Ataxic gait



     Assoc,yogi Gann M.D.    



     Hospitalists      









 R53.1  Weakness

 

 R25.1  Tremor, unspecified

 

 I10  Essential (primary) hypertension









 Office Visit  2020  2:03p  [facility]  Gabby Worthington,  R29.6  Repeated 
falls



     Assoc,pc  N.POdalis    



     Hospitalists      









 R26.0  Ataxic gait







Assessments







 Date  Code  Description  Provider

 

 2020  M48.02  Spinal stenosis, cervical region  Gaurav Romero M.D.

 

 2020  I10  Essential (primary) hypertension  Gaurav Romero M.D.

 

 2020  F10.21  Alcohol dependence, in remission  Gaurav Romero M.D.

 

 2020  M48.02  Spinal stenosis, cervical region  Karey Garrido, DO

 

 2020  M50.021  Cervical disc disorder at C4-C5 level  Karey Garrido, 



     with myelopathy  

 

 2020  R26.0  Ataxic gait  Karey Garrido, DO

 

 2020  R29.6  Repeated falls  Karey Hema, DO

 

 2020  M48.02  Spinal stenosis, cervical region  Karey Garrido, DO

 

 2020  R26.0  Ataxic gait  Karey Garrido, DO

 

 2020  M47.12  Other spondylosis with myelopathy,  Andie Rodriguez MD



     cervical region  

 

 2020  M48.02  Spinal stenosis, cervical region  KELLEY CarsonC

 

 2020  M48.02  Spinal stenosis, cervical region  Andie Rodriguez MD

 

 2020  I10  Essential (primary) hypertension  Cailin Marks M.D.

 

 2020  R26.0  Ataxic gait  Deb Lerner PA-C

 

 2020  E83.42  Hypomagnesemia  KELLEY CarsonC

 

 2020  M47.12  Other spondylosis with myelopathy,  Andie Rodriguez MD



     cervical region  

 

 2020  M48.02  Spinal stenosis, cervical region  Lennox Gann M.D.

 

 2020  M48.02  Spinal stenosis, cervical region  Andie Rodriguez MD

 

 2020  M50.021  Cervical disc disorder at C4-C5 level  Lennox Gann M.D.



     with myelopathy  

 

 2020  R26.0  Ataxic gait  Lennox Gann M.D.

 

 2020  I10  Essential (primary) hypertension  Lennox Gann M.D.

 

 2020  M48.02  Spinal stenosis, cervical region  Lennox Gann M.D.

 

 2020  R26.0  Ataxic gait  Lennox Gann M.D.

 

 2020  F10.21  Alcohol dependence, in remission  Lennox Gann M.D.

 

 2020  I10  Essential (primary) hypertension  Lennox Gann M.D.

 

 03/15/2020  R26.0  Ataxic gait  Lennox Gann M.D.

 

 03/15/2020  R53.1  Weakness  Lennox Gann M.D.

 

 03/15/2020  R25.1  Tremor, unspecified  Lennox Gann M.D.

 

 03/15/2020  I10  Essential (primary) hypertension  Lennox Gann M.D.

 

 2020  R29.6  Repeated falls  Gabby Worthington N.P.

 

 2020  R26.0  Ataxic gait  Gabby Worthington N.P.







Plan of Treatment

2020 - Gaurav Romero M.D.M48.02 Spinal stenosis, cervical 
regionComments:Increased weakness and tingling symptoms prompting his recent 
admission.  Neurosurgical evaluation resulted in a recommendation for cervical 
spine surgery for his severe spinal canal stenosis.  Patientdeclined surgical 
intervention at that time and is interested in getting a second opinion.  No 
acutechanges noted but patient advised to proceed with the second opinion as 
soon as possible given the serious findings on his scan.  Patient also advised 
[place] [facility] [place] [place] get a copy of his MRI scans on disc for his planned 
neurosurgical consult.I10 Essential (primary) hypertensionComments:No BP 
problems in the hospital per patient.  Occasional home blood pressure checks 
advised.F10.21 Alcohol dependence, in remissionComments:(+) Past history of 
alcohol abuse with ? encephalopathy in 2018.  Patient denies heavy intake now 
but admits he is still drinking daily.



Functional Status







 Description

 

 No Information Available







Mental Status







 Description

 

 No Information Available







Referrals







 Description

 

 No Information Available

## 2020-04-23 NOTE — XMS REPORT
Continuity of Care Document (CCD)

 Created on:2020



Patient:Tanner Fisher

Sex:Male

:1955

External Reference #:[id number]





Demographics







 Address  [address]



   [place] [address]

 

 Mobile Phone  [phone]

 

 Work Phone  [phone]

 

 Email Address  [email or web address]

 

 Preferred Language  en

 

 Marital Status  Not  or 

 

 Religion Affiliation  Unknown

 

 Race  White

 

 Ethnic Group  Not  or 









Author







 Name  Lennox Gann M.D. (transmitted by agent of provider Tamara Cedeno)

 

 Address  [address]



   Unavailable



   [place] [address]









Care Team Providers







 Name  Role  Phone

 

 Sahil Jc MD -  Care Team Information   [phone]



 Otolaryngology    

 

 Joseph Busby MD - Urology  Care Team Information   [phone]

 

 Trell Grijalva MD - Orthopaedic  Care Team Information   [phone]
[phone]



 Surgery    

 

 Althea Rodriguez M.D. - Family Medicine  Care Team Information   [phone]
[phone]









Problems







 Active Problems  Provider  Date

 

 Mixed hyperlipidemia  Darwin Miranda M.D.,FACP  Onset: 2008

 

 Essential tremor  Darwin Miranda M.D.,FACP  Onset: 2008

 

 Essential hypertension  Ross Tejeda NP  Onset: 2016

 

 Alcohol-induced cerebellar ataxia  Darwin Miranda M.D.,FACP  Onset: 2018

 

 Chronic alcoholism in remission  Darwin Miranda M.D.,FACP  Onset: 2018

 

 Ex-smoker  Darwin Miranda M.D.,FACP  Onset: 2018

 

 Wernicke's disease  Saman May M.D.  Onset: 2018

 

 Spinal cord injury without spinal bone  Saman May M.D.  Onset: 2018



 injury    

 

 Static encephalopathy  Saman May M.D.  Onset: 2018

 

 Late effect of intracranial injury  Saman May M.D.  Onset: 2018



 without skull fracture    

 

 Idiopathic peripheral neuropathy  Saman May M.D.  Onset: 2018

 

 Nondependent alcohol abuse in  Saman May M.D.  Onset: 2018



 remission    

 

 Cervical spondylosis with myelopathy  Han Kingsley M.D.  Onset: 2018







Social History







 Type  Date  Description  Comments

 

 Birth Sex    Unknown  

 

 Tobacco Use  Start: Unknown End:  Former Cigarette Smoker  started age 24, quit



       age 54 for 8 years,



       then smoked again



       for one year total



       31 years 1 PPD

 

 Smoking Status  Reviewed: 19  Former Cigarette Smoker  started age 24, 
quit



       age 54 for 8 years,



       then smoked again



       for one year total



       31 years 1 PPD

 

 ETOH Use    Denies alcohol use  

 

 ETOH Use    consumes 5-6 beers per  



     week  

 

 Recreational Drug Use    Denies Drug Use  

 

 Tobacco Use  Start: Unknown End:  Patient is a former  quit 2009



   Unknown  smoker  







Allergies, Adverse Reactions, Alerts







 Active Allergies  Reaction  Severity  Comments  Date

 

 Celexa      worse depression  2008

 

 Lisinopril      cough  2008

 

 HCTZ      hyperglycemia  2008







Medications







 Active Medications  SIG  Qnty  Indications  Ordering  Date



         Provider  

 

 Shingrix  0.5 milliliters  1units  Z00.01  Althea Rodriguez MD  2019



         50mcg/0.5ML  intramuscular now        



 Suspension Rec  and 1-2 months later        



   repeat        

 

 Bupropion  Take 1 Tablet By  90tabs    Rosetta Conway,  2019



 Hydrochloride ER  Mouth Every Day      M.DOdalis  



 (SR)          



     100mg Tablets ER          



 12HR          



           

 

 Metoprolol Succinate  take 1 tablet by  90tabs  I10  Roxann Nolasco,  2016



 ER  mouth every day      M.DOdalis, FACP  



   50mg Tablets ER          



 24HR          



           

 

 Simvastatin  Take 1 Tablet By  90tabs    Cherelle  2013



            20mg  Mouth Every Day In      AMADOR Dennis  



 Tablets  The Evening        



           

 

 Omeprazole  take 1 capsule by  90caps    Althea Rodriguez MD  2011



           20mg  mouth every day        



 Capsules DR Avitia  take 1 tablet by  90tabs  I10  Cherelle  2011



       160mg Tablets  mouth every day      AMADOR Dennis  



           

 

 Fish Oil + D3  1 capsule daily      Unknown  



           



 1200-1000mg-Unit          



 Capsules          



           

 

 Saw Palmetto  1 po hs  120caps    Unknown  



             450mg          



 Capsules          



           

 

 Centrum Silver Ultra  1 po qd      Unknown  



 Mens          



      Tablets          



           

 

 Ascorbic Acid  1 by mouth every day      Unknown  



              500mg          



 Tablets          



           

 

 Magnesium Oxide  2 tab by mouth every      Unknown  



                400mg  day        



 Tablets          



           







Immunizations







 CPT Code  Status  Date  Vaccine  Reaction  Lot #

 

 [id number]  Given  2019  Pneumococcal Conjugate    [id number]



       Vaccine 13 Valent For    



       Intramuscular Use    

 

 [id number]  Given  01/15/2018  Influenza Virus Vaccine,  no immedite reaction  [id number]



       Quadrivalent, Split,  noted .. hh  



       Preservative Free    

 

 98363  Given  2016  Zoster (Zostavax)    [id number]

 

 02321  Given  2016  Influenza Virus Vaccine,    [id number]



       Quadrivalent, Split,    



       Preservative Free    

 

 89044  Given  2016  Tdap -    [id number]



       Tetanus/Diptheria/Acellular    



       Pertussis    

 

   Given  2012  Fluzone Vaccine    [id number]

 

 58707  Given  2011  Influenza Virus 3Yrs & Over    [id number]







Vital Signs







 Date  Vital  Result  Comment

 

 2019 11:20am  Height  65 inches  5'5"









 Weight  155.00 lb  shoes

 

 Heart Rate  76 /min  

 

 BP Systolic  148 mmHg  

 

 BP Diastolic  89 mmHg  

 

 Body Temperature  98.6 F  

 

 O2 % BldC Oximetry  98 %  

 

 BMI (Body Mass Index)  25.8 kg/m2  









 2018  9:06am  Height  65 inches  5'5"









 Weight  150.25 lb  

 

 Heart Rate  74 /min  

 

 BP Systolic Sitting  128 mmHg  

 

 BP Diastolic Sitting  72 mmHg  

 

 Pain Level  0  

 

 BMI (Body Mass Index)  25.0 kg/m2  







Results







 Test  Acquired Date  Facility  Test  Result  H/L  Range  Note

 

 Laboratory test  2020  [facility]  Ammonia  49 mcmol/L  
Normal  16-53  



 finding    [address] [place] [address] [phone]          









 Vitamin B1 (Whole Blood)  127 nmol/L      1









 Basic Metabolic  2020  [facility]  Sodium  137 mmol/L  Normal
  135-145  



 Panel    [address] [place] [address] [phone]          









 Potassium  4.1 mmol/L  Normal  3.5-5.0  

 

 Chloride  104 mmol/L  Normal  101-111  

 

 Co2 Carbon Dioxide  20 mmol/L  Low  22-32  

 

 Anion Gap  13 mmol/L  High  2-11  

 

 Glucose  83 mg/dL  Normal    

 

 Blood Urea Nitrogen  12 mg/dL  Normal  6-24  

 

 Creatinine  0.68 mg/dL  Normal  0.67-1.17  

 

 BUN/Creatinine Ratio  17.6  Normal  8-20  

 

 Calcium  7.9 mg/dL  Low  8.6-10.3  

 

 Egfr Non-  117.0    >60  

 

 Egfr   141.6    >60  2









 CBC Auto  2020  [facility]  White Blood  11.4 10^3/uL  High  
3.5-10.8  



 Diff    [address]  Count        



     [place] [address] [phone]          









 Red Blood Count  3.89 10^6/uL  Low  4.18-5.48  

 

 Hemoglobin  13.5 g/dL  Low  14.0-18.0  

 

 Hematocrit  40 %  Low  42-52  

 

 Mean Corpuscular Volume  102 fL  High  80-94  

 

 Mean Corpuscular Hemoglobin  35 pg  High  27-31  

 

 Mean Corpuscular HGB Conc  34 g/dL  Normal  31-36  

 

 Red Cell Distribution Width  14 %  Normal  10-15  

 

 Platelet Count  257 10^3/uL  Normal  150-450  

 

 Mean Platelet Volume  6.7 fL  Low  7.4-10.4  

 

 Abs Neutrophils  10.4 10^3/uL  High  1.5-7.7  

 

 Abs Lymphocytes  0.5 10^3/uL  Low  1.0-4.8  

 

 Abs Monocytes  0.5 10^3/uL  Normal  0-0.8  

 

 Abs Eosinophils  0.0 10^3/uL  Normal  0-0.6  

 

 Abs Basophils  0.0 10^3/uL  Normal  0-0.2  

 

 Abs Nucleated RBC  0.0 10^3/uL      

 

 Granulocyte %  90.9 %      

 

 Lymphocyte %  4.4 %      

 

 Monocyte %  4.5 %      

 

 Eosinophil %  0.0 %      

 

 Basophil %  0.2 %      

 

 Nucleated Red Blood Cells %  0.0      









 Comp Metabolic  2020  [facility]  Sodium  138 mmol/L  Normal  
135-145  



 Panel    [address]          



     [place] [address] [phone]          









 Potassium  3.9 mmol/L  Normal  3.5-5.0  

 

 Chloride  99 mmol/L  Low  101-111  

 

 Co2 Carbon Dioxide  20 mmol/L  Low  22-32  

 

 Anion Gap  19 mmol/L  High  2-11  

 

 Glucose  127 mg/dL  High    

 

 Blood Urea Nitrogen  12 mg/dL  Normal  6-24  

 

 Creatinine  0.76 mg/dL  Normal  0.67-1.17  

 

 BUN/Creatinine Ratio  15.8  Normal  8-20  

 

 Calcium  8.7 mg/dL  Normal  8.6-10.3  

 

 Total Protein  6.8 g/dL  Normal  6.4-8.9  

 

 Albumin  3.7 g/dL  Normal  3.2-5.2  

 

 Globulin  3.1 g/dL  Normal  2-4  

 

 Albumin/Globulin Ratio  1.2  Normal  1-3  

 

 Total Bilirubin  0.90 mg/dL  Normal  0.2-1.0  

 

 Alkaline Phosphatase  105 U/L  High    

 

 Alt  35 U/L  Normal  7-52  

 

 Ast  59 U/L  High  13-39  

 

 Egfr Non-  102.9    >60  

 

 Egfr   124.6    >60  3









 Laboratory test  2020  [facility]  Alcohol  < 10 mg/dL  
Normal  <10  



 finding    [address] [place] [address] [phone]          









 TSH (Thyroid Stim Horm)  0.54 mcIU/mL  Normal  0.34-5.60  

 

 Acetaminophen  < 15 g/mL      4

 

 Salicylate  < 2.50 mg/dL    <30  

 

 Osmolality Serum  294 mOsm/kg  Normal  275-295  









 1  -------------------ADDITIONAL INFORMATION-------------------



   This test was developed and its performance characteristics



   determined by [facility] in a manner consistent with CLIA



   requirements. This test has not been cleared or approved by



   the U.S. Food and Drug Administration.



   Test Performed by:



   [facility]



   [address] [place] [address]



   : Sancho Sifuentes M.D. Ph.D.; CLIA# [id number]

 

 2  *******Because ethnic data is not always readily available,



   this report includes an eGFR for both -Americans and



   non- Americans.****



   The National Kidney Disease Education Program (NKDEP) does



   not endorse the use of the MDRD equation for patients that



   are not between the ages of 18 and 70, are pregnant, have



   extremes of body size, muscle mass, or nutritional status,



   or are non- or non-.



   According to the National Kidney Foundation, irrespective of



   diagnosis, the stage of the disease is based on the level of



   kidney function:



   Stage Description                      GFR(mL/min/1.73 m(2))



   1     Kidney damage with normal or decreased GFR       90



   2     Kidney damage with mild decrease in GFR          60-89



   3     Moderate decrease in GFR                         30-59



   4     Severe decrease in GFR                           15-29



   5     Kidney failure                       <15 (or dialysis)

 

 3  *******Because ethnic data is not always readily available,



   this report includes an eGFR for both -Americans and



   non- Americans.****



   The National Kidney Disease Education Program (NKDEP) does



   not endorse the use of the MDRD equation for patients that



   are not between the ages of 18 and 70, are pregnant, have



   extremes of body size, muscle mass, or nutritional status,



   or are non- or non-.



   According to the National Kidney Foundation, irrespective of



   diagnosis, the stage of the disease is based on the level of



   kidney function:



   Stage Description                      GFR(mL/min/1.73 m(2))



   1     Kidney damage with normal or decreased GFR       90



   2     Kidney damage with mild decrease in GFR          60-89



   3     Moderate decrease in GFR                         30-59



   4     Severe decrease in GFR                           15-29



   5     Kidney failure                       <15 (or dialysis)

 

 4  Therapeutic concentration: <50 ug/mL



   Toxic concentration: >120 ug/mL







Procedures







 Date  Code  Description  Status

 

 2020  07022  ECHO Transthorasic Realtime 2D W Doppler & Color Flow  
Completed



     Hosp  

 

 2018  747578151  Diabetic Retinal Eye Exam  Completed

 

 2017  00788879  Colonoscopy  Completed

 

 2006  [id number]  Colonoscopy  Completed







[employer]







 Description

 

 No Information Available







Encounters







 Type  Date  Location  Provider  Dx  Diagnosis

 

 Office Visit  2020  [facility]  Karey Garrido,  M48.02  Spinal 
stenosis,



   2:06p  yogi Walsh DO    cervical region



     Hospitalists      









 R26.0  Ataxic gait









 Office Visit  2020  2:06p  [facility]  Deb  M48.02  Spinal



     Assocyogi PA-C    stenosis,



     Hospitalists      cervical region









 R26.0  Ataxic gait

 

 E83.42  Hypomagnesemia









 Office Visit  2020  [facility]  M48.02  Spinal



   2:05p  yogi Walsh M.D.    stenosis,



     Hospitalists      cervical region









 M50.021  Cervical disc disorder at C4-C5 level with myelopathy

 

 R26.0  Ataxic gait

 

 I10  Essential (primary) hypertension









 Office Visit  2020  [facility]  M48.02  Spinal



   2:05p  Assoc,yogi Gann M.D.    stenosis,



     Hospitalists      cervical region









 R26.0  Ataxic gait

 

 F10.21  Alcohol dependence, in remission

 

 I10  Essential (primary) hypertension









 Office Visit  03/15/2020  2:04p  [facility]  Lennox  R26.0  Ataxic gait



     Assoc,yogi Gann M.D.    



     Hospitalists      









 R53.1  Weakness

 

 R25.1  Tremor, unspecified

 

 I10  Essential (primary) hypertension









 Office Visit  2020  2:03p  [facility]  Gabby Ander,  R29.6  Repeated 
falls



     Assoc,pc  N.P.    



     Hospitalists      









 R26.0  Ataxic gait







Assessments







 Date  Code  Description  Provider

 

 2020  M48.02  Spinal stenosis, cervical region  Karey Senner, DO

 

 2020  M50.021  Cervical disc disorder at C4-C5 level  Karey Garrido, DO



     with myelopathy  

 

 2020  R26.0  Ataxic gait  Karey Senner, DO

 

 2020  R29.6  Repeated falls  Karey Senner, DO

 

 2020  M48.02  Spinal stenosis, cervical region  Karey Senner, DO

 

 2020  R26.0  Ataxic gait  Karey Senner, DO

 

 2020  M48.02  Spinal stenosis, cervical region  Deb Lerner PA-C

 

 2020  I10  Essential (primary) hypertension  Cailin Marks M.D.

 

 2020  R26.0  Ataxic gait  Deb Lerner PA-C

 

 2020  E83.42  Hypomagnesemia  Deb Lerner PA-C

 

 2020  M48.02  Spinal stenosis, cervical region  Lennox Gann M.D.

 

 2020  M50.021  Cervical disc disorder at C4-C5 level  Lennox Gann M.D.



     with myelopathy  

 

 2020  R26.0  Ataxic gait  Lennox Gann M.D.

 

 2020  I10  Essential (primary) hypertension  Lennox Gann M.D.

 

 2020  M48.02  Spinal stenosis, cervical region  Lennox Gann M.D.

 

 2020  R26.0  Ataxic gait  Lennox Gann M.D.

 

 2020  F10.21  Alcohol dependence, in remission  Lennox Gann M.D.

 

 2020  I10  Essential (primary) hypertension  Lennox Gann M.D.

 

 03/15/2020  R26.0  Ataxic gait  Lennox Gann M.D.

 

 03/15/2020  R53.1  Weakness  Lennox Gann M.D.

 

 03/15/2020  R25.1  Tremor, unspecified  Lennox Gann M.D.

 

 03/15/2020  I10  Essential (primary) hypertension  Lennox Gann M.D.

 

 2020  R29.6  Repeated falls  Gabby Worthington, N.P.

 

 2020  R26.0  Ataxic gait  Gabby Worthington, N.P.







Plan of Treatment

2018 - Saman May M.D.G60.8 Other hereditary and idiopathic 
neuropathiesFollow up:Follow up 1 week after NS kpcjyslkdtaT11.11 Alcohol abuse
, in bcvfyhzyjV43.5x9S Traumatic subdural hemorrhage with loss of consciousness 
of unspecified duration, qvnqgvaE54.2 Degeneration of nervous system due to 
shuzfhoQ01.129D Central cord syndrome at unspecified level of cervical spinal 
cord, subsequent encounterReferral:Han Kingsley M.D., Surgery,
MbcobyilreicL73.2 Paresthesia of skin



Functional Status







 Description

 

 No Information Available







Mental Status







 Description

 

 No Information Available







Referrals







 Description

 

 No Information Available

## 2020-04-23 NOTE — XMS REPORT
Continuity of Care Document (CCD)

 Created on:2020



Patient:Tanner Fisher

Sex:Male

:1955

External Reference #:[id number]





Demographics







 Address  [address]



   [place] [address]

 

 Mobile Phone  [phone]

 

 Work Phone  [phone]

 

 Email Address  [email or web address]

 

 Preferred Language  en

 

 Marital Status  Not  or 

 

 Cheondoism Affiliation  Unknown

 

 Race  White

 

 Ethnic Group  Not  or 









Author







 Name  Gabby Worthington N.P. (transmitted by agent of provider Tamara Cedeno)

 

 Address  [address], [address]



   [place] [address]









Care Team Providers







 Name  Role  Phone

 

 Sahil Jc MD -  Care Team Information   [phone]



 Otolaryngology    

 

 Joseph Busby MD - Urology  Care Team Information   [phone]

 

 Trell Grijalva MD - Orthopaedic  Care Team Information   [phone]
[phone]



 Surgery    

 

 Althea Rodriguez M.D. - Family Medicine  Care Team Information   [phone]
[phone]









Problems







 Active Problems  Provider  Date

 

 Mixed hyperlipidemia  Darwin Miranda M.D.,FACP  Onset: 2008

 

 Essential tremor  Darwin Miranda M.D.,FACP  Onset: 2008

 

 Essential hypertension  Ross Tejeda NP  Onset: 2016

 

 Alcohol-induced cerebellar ataxia  Darwin Miranda M.D.,FACP  Onset: 2018

 

 Chronic alcoholism in remission  Darwin Miranda M.D.,FACP  Onset: 2018

 

 Ex-smoker  Darwin Miranda M.D.,FACP  Onset: 2018

 

 Wernicke's disease  Saman May M.D.  Onset: 2018

 

 Spinal cord injury without spinal bone  Saman May M.D.  Onset: 2018



 injury    

 

 Static encephalopathy  Saman May M.D.  Onset: 2018

 

 Late effect of intracranial injury  Saman May M.D.  Onset: 2018



 without skull fracture    

 

 Idiopathic peripheral neuropathy  Saman May M.D.  Onset: 2018

 

 Nondependent alcohol abuse in  Saman May M.D.  Onset: 2018



 remission    

 

 Cervical spondylosis with myelopathy  Han Kingsley M.D.  Onset: 2018







Social History







 Type  Date  Description  Comments

 

 Birth Sex    Unknown  

 

 Tobacco Use  Start: Unknown End:  Former Cigarette Smoker  started age 24, quit



       age 54 for 8 years,



       then smoked again



       for one year total



       31 years 1 PPD

 

 Smoking Status  Reviewed: 19  Former Cigarette Smoker  started age 24, 
quit



       age 54 for 8 years,



       then smoked again



       for one year total



       31 years 1 PPD

 

 ETOH Use    Denies alcohol use  

 

 ETOH Use    consumes 5-6 beers per  



     week  

 

 Recreational Drug Use    Denies Drug Use  

 

 Tobacco Use  Start: Unknown End:  Patient is a former  quit 2009



   Unknown  smoker  







Allergies, Adverse Reactions, Alerts







 Active Allergies  Reaction  Severity  Comments  Date

 

 Celexa      worse depression  2008

 

 Lisinopril      cough  2008

 

 HCTZ      hyperglycemia  2008







Medications







 Active Medications  SIG  Qnty  Indications  Ordering  Date



         Provider  

 

 Shingrix  0.5 milliliters  1units  Z00.01  Althea Rodriguez MD  2019



         50mcg/0.5ML  intramuscular now        



 Suspension Rec  and 1-2 months later        



   repeat        

 

 Bupropion  Take 1 Tablet By  90tabs    Rosetta Conway,  2019



 Hydrochloride ER  Mouth Every Day      M.D.  



 (SR)          



     100mg Tablets ER          



 12HR          



           

 

 Metoprolol Succinate  take 1 tablet by  90tabs  I10  Roxann Nolasco,  2016



 ER  mouth every day      M.DOdalsi, FACP  



   50mg Tablets ER          



 24HR          



           

 

 Simvastatin  Take 1 Tablet By  90tabs    Cherelle  2013



            20mg  Mouth Every Day In      AMADOR Dennis  



 Tablets  The Evening        



           

 

 Omeprazole  take 1 capsule by  90caps    Althea Rodriguez MD  2011



           20mg  mouth every day        



 Capsules DR Avitia  take 1 tablet by  90tabs  I10  Cherelle  2011



       160mg Tablets  mouth every day      AMADOR Dennis  



           

 

 Fish Oil + D3  1 capsule daily      Unknown  



           



 1200-1000mg-Unit          



 Capsules          



           

 

 Saw Palmetto  1 po hs  120caps    Unknown  



             450mg          



 Capsules          



           

 

 Centrum Silver Ultra  1 po qd      Unknown  



 Mens          



      Tablets          



           

 

 Ascorbic Acid  1 by mouth every day      Unknown  



              500mg          



 Tablets          



           

 

 Magnesium Oxide  2 tab by mouth every      Unknown  



                400mg  day        



 Tablets          



           







Immunizations







 CPT Code  Status  Date  Vaccine  Reaction  Lot #

 

 [id number]  Given  2019  Pneumococcal Conjugate    [id number]



       Vaccine 13 Valent For    



       Intramuscular Use    

 

 32555  Given  01/15/2018  Influenza Virus Vaccine,  no immedite reaction  [id number]



       Quadrivalent, Split,  noted .. hh  



       Preservative Free    

 

 50276  Given  2016  Zoster (Zostavax)    [id number]

 

 06302  Given  2016  Influenza Virus Vaccine,    [id number]



       Quadrivalent, Split,    



       Preservative Free    

 

 42913  Given  2016  Tdap -    [id number]



       Tetanus/Diptheria/Acellular    



       Pertussis    

 

   Given  2012  Fluzone Vaccine    [id number]

 

 17110  Given  2011  Influenza Virus 3Yrs & Over    [id number]







Vital Signs







 Date  Vital  Result  Comment

 

 2019 11:20am  Height  65 inches  5'5"









 Weight  155.00 lb  shoes

 

 Heart Rate  76 /min  

 

 BP Systolic  148 mmHg  

 

 BP Diastolic  89 mmHg  

 

 Body Temperature  98.6 F  

 

 O2 % BldC Oximetry  98 %  

 

 BMI (Body Mass Index)  25.8 kg/m2  









 2018  9:06am  Height  65 inches  5'5"









 Weight  150.25 lb  

 

 Heart Rate  74 /min  

 

 BP Systolic Sitting  128 mmHg  

 

 BP Diastolic Sitting  72 mmHg  

 

 Pain Level  0  

 

 BMI (Body Mass Index)  25.0 kg/m2  







Results







 Test  Acquired Date  Facility  Test  Result  H/L  Range  Note

 

 Laboratory test  2020  [facility]  Ammonia  49 mcmol/L  
Normal  16-53  



 finding    [address] [place] [address] [phone]          









 Vitamin B1 (Whole Blood)  127 nmol/L      1









 Basic Metabolic  2020  [facility]  Sodium  137 mmol/L  Normal
  135-145  



 Panel    [address] [place] [address] [phone]          









 Potassium  4.1 mmol/L  Normal  3.5-5.0  

 

 Chloride  104 mmol/L  Normal  101-111  

 

 Co2 Carbon Dioxide  20 mmol/L  Low  22-32  

 

 Anion Gap  13 mmol/L  High  2-11  

 

 Glucose  83 mg/dL  Normal    

 

 Blood Urea Nitrogen  12 mg/dL  Normal  6-24  

 

 Creatinine  0.68 mg/dL  Normal  0.67-1.17  

 

 BUN/Creatinine Ratio  17.6  Normal  8-20  

 

 Calcium  7.9 mg/dL  Low  8.6-10.3  

 

 Egfr Non-  117.0    >60  

 

 Egfr   141.6    >60  2









 CBC Auto  2020  [facility]  White Blood  11.4 10^3/uL  High  
3.5-10.8  



 Diff    [address]  Count        



     [place] [address] [phone]          









 Red Blood Count  3.89 10^6/uL  Low  4.18-5.48  

 

 Hemoglobin  13.5 g/dL  Low  14.0-18.0  

 

 Hematocrit  40 %  Low  42-52  

 

 Mean Corpuscular Volume  102 fL  High  80-94  

 

 Mean Corpuscular Hemoglobin  35 pg  High  27-31  

 

 Mean Corpuscular HGB Conc  34 g/dL  Normal  31-36  

 

 Red Cell Distribution Width  14 %  Normal  10-15  

 

 Platelet Count  257 10^3/uL  Normal  150-450  

 

 Mean Platelet Volume  6.7 fL  Low  7.4-10.4  

 

 Abs Neutrophils  10.4 10^3/uL  High  1.5-7.7  

 

 Abs Lymphocytes  0.5 10^3/uL  Low  1.0-4.8  

 

 Abs Monocytes  0.5 10^3/uL  Normal  0-0.8  

 

 Abs Eosinophils  0.0 10^3/uL  Normal  0-0.6  

 

 Abs Basophils  0.0 10^3/uL  Normal  0-0.2  

 

 Abs Nucleated RBC  0.0 10^3/uL      

 

 Granulocyte %  90.9 %      

 

 Lymphocyte %  4.4 %      

 

 Monocyte %  4.5 %      

 

 Eosinophil %  0.0 %      

 

 Basophil %  0.2 %      

 

 Nucleated Red Blood Cells %  0.0      









 Comp Metabolic  2020  [facility]  Sodium  138 mmol/L  Normal  
135-145  



 Panel    [address]          



     [place] [address] [phone]          









 Potassium  3.9 mmol/L  Normal  3.5-5.0  

 

 Chloride  99 mmol/L  Low  101-111  

 

 Co2 Carbon Dioxide  20 mmol/L  Low  22-32  

 

 Anion Gap  19 mmol/L  High  2-11  

 

 Glucose  127 mg/dL  High    

 

 Blood Urea Nitrogen  12 mg/dL  Normal  6-24  

 

 Creatinine  0.76 mg/dL  Normal  0.67-1.17  

 

 BUN/Creatinine Ratio  15.8  Normal  8-20  

 

 Calcium  8.7 mg/dL  Normal  8.6-10.3  

 

 Total Protein  6.8 g/dL  Normal  6.4-8.9  

 

 Albumin  3.7 g/dL  Normal  3.2-5.2  

 

 Globulin  3.1 g/dL  Normal  2-4  

 

 Albumin/Globulin Ratio  1.2  Normal  1-3  

 

 Total Bilirubin  0.90 mg/dL  Normal  0.2-1.0  

 

 Alkaline Phosphatase  105 U/L  High    

 

 Alt  35 U/L  Normal  7-52  

 

 Ast  59 U/L  High  13-39  

 

 Egfr Non-  102.9    >60  

 

 Egfr   124.6    >60  3









 Laboratory test  2020  [facility]  Alcohol  < 10 mg/dL  
Normal  <10  



 finding    [address] [place] [address] [phone]          









 TSH (Thyroid Stim Horm)  0.54 mcIU/mL  Normal  0.34-5.60  

 

 Acetaminophen  < 15 g/mL      4

 

 Salicylate  < 2.50 mg/dL    <30  

 

 Osmolality Serum  294 mOsm/kg  Normal  275-295  









 1  -------------------ADDITIONAL INFORMATION-------------------



   This test was developed and its performance characteristics



   determined by [facility] in a manner consistent with CLIA



   requirements. This test has not been cleared or approved by



   the U.S. Food and Drug Administration.



   Test Performed by:



   [facility]



   [address] [place] [address]



   : Sancho Sifuentes M.D. Ph.D.; CLIA# [id number]

 

 2  *******Because ethnic data is not always readily available,



   this report includes an eGFR for both -Americans and



   non- Americans.****



   The National Kidney Disease Education Program (NKDEP) does



   not endorse the use of the MDRD equation for patients that



   are not between the ages of 18 and 70, are pregnant, have



   extremes of body size, muscle mass, or nutritional status,



   or are non- or non-.



   According to the National Kidney Foundation, irrespective of



   diagnosis, the stage of the disease is based on the level of



   kidney function:



   Stage Description                      GFR(mL/min/1.73 m(2))



   1     Kidney damage with normal or decreased GFR       90



   2     Kidney damage with mild decrease in GFR          60-89



   3     Moderate decrease in GFR                         30-59



   4     Severe decrease in GFR                           15-29



   5     Kidney failure                       <15 (or dialysis)

 

 3  *******Because ethnic data is not always readily available,



   this report includes an eGFR for both -Americans and



   non- Americans.****



   The National Kidney Disease Education Program (NKDEP) does



   not endorse the use of the MDRD equation for patients that



   are not between the ages of 18 and 70, are pregnant, have



   extremes of body size, muscle mass, or nutritional status,



   or are non- or non-.



   According to the National Kidney Foundation, irrespective of



   diagnosis, the stage of the disease is based on the level of



   kidney function:



   Stage Description                      GFR(mL/min/1.73 m(2))



   1     Kidney damage with normal or decreased GFR       90



   2     Kidney damage with mild decrease in GFR          60-89



   3     Moderate decrease in GFR                         30-59



   4     Severe decrease in GFR                           15-29



   5     Kidney failure                       <15 (or dialysis)

 

 4  Therapeutic concentration: <50 ug/mL



   Toxic concentration: >120 ug/mL







Procedures







 Date  Code  Description  Status

 

 2020  17625  ECHO Transthorasic Realtime 2D W Doppler & Color Flow  
Completed



     Hosp  

 

 2018  321198693  Diabetic Retinal Eye Exam  Completed

 

 2017  75516926  Colonoscopy  Completed

 

 2006  07878039  Colonoscopy  Completed







[employer] Devices







 Description

 

 No Information Available







Encounters







 Type  Date  Location  Provider  Dx  Diagnosis

 

 Office Visit  2020  [facility]  Karey Garrido  M48.02  Spinal 
stenosis,



   2:06p  yogi Walsh  DO    cervical region



     Hospitalists      









 R26.0  Ataxic gait









 Office Visit  2020  2:06p  [facility]  Deb  M48.02  Spinal



     Assoc,yogi Lerner PA-C    stenosis,



     Hospitalists      cervical region









 R26.0  Ataxic gait

 

 E83.42  Hypomagnesemia









 Office Visit  2020  [facility]  M48.02  Spinal



   2:05p  yogi Walsh M.D.    stenosis,



     Hospitalists      cervical region









 M50.021  Cervical disc disorder at C4-C5 level with myelopathy

 

 R26.0  Ataxic gait

 

 I10  Essential (primary) hypertension









 Office Visit  2020  [facility]  M48.02  Spinal



   2:05p  Assoc,yogi Gann M.D.    stenosis,



     Hospitalists      cervical region









 R26.0  Ataxic gait

 

 F10.21  Alcohol dependence, in remission

 

 I10  Essential (primary) hypertension









 Office Visit  03/15/2020  2:04p  [facility]  R26.0  Ataxic gait



     Assoc,yogi Gann M.D.    



     Hospitalists      









 R53.1  Weakness

 

 R25.1  Tremor, unspecified

 

 I10  Essential (primary) hypertension









 Office Visit  2020  2:03p  [facility]  Gabby Ander,  R29.6  Repeated 
falls



     Assoc,pc  N.POdalis    



     Hospitalists      









 R26.0  Ataxic gait







Assessments







 Date  Code  Description  Provider

 

 2020  M48.02  Spinal stenosis, cervical region  Karey Senner, DO

 

 2020  M50.021  Cervical disc disorder at C4-C5 level  Kaery Garrido, DO



     with myelopathy  

 

 2020  R26.0  Ataxic gait  Karey Senner, DO

 

 2020  R29.6  Repeated falls  Karey Senner, DO

 

 2020  M48.02  Spinal stenosis, cervical region  Karey Senner, DO

 

 2020  R26.0  Ataxic gait  Karey Senmelva, DO

 

 2020  M48.02  Spinal stenosis, cervical region  Deb Lerner PA-C

 

 2020  I10  Essential (primary) hypertension  Cailin Marks M.D.

 

 2020  R26.0  Ataxic gait  Deb Lerner PA-C

 

 2020  E83.42  Hypomagnesemia  Deb Lerner PA-C

 

 2020  M48.02  Spinal stenosis, cervical region  Lennox Gann M.D.

 

 2020  M50.021  Cervical disc disorder at C4-C5 level  Lennox Gann M.D.



     with myelopathy  

 

 2020  R26.0  Ataxic gait  Lennox Gann M.D.

 

 2020  I10  Essential (primary) hypertension  Lennox Gann M.D.

 

 2020  M48.02  Spinal stenosis, cervical region  Lennox Gann M.D.

 

 2020  R26.0  Ataxic gait  Lennox Gann M.D.

 

 2020  F10.21  Alcohol dependence, in remission  Lennox Gann M.D.

 

 2020  I10  Essential (primary) hypertension  Lennox Gann M.D.

 

 03/15/2020  R26.0  Ataxic gait  Lennox Gann M.D.

 

 03/15/2020  R53.1  Weakness  Lennox Gann M.D.

 

 03/15/2020  R25.1  Tremor, unspecified  Lennox Gann M.D.

 

 03/15/2020  I10  Essential (primary) hypertension  Lennox Gann M.D.

 

 2020  R29.6  Repeated falls  Gabby Worthington, N.P.

 

 2020  R26.0  Ataxic gait  Gabby Worthington, N.P.







Plan of Treatment

2018 - Saman May M.D.G60.8 Other hereditary and idiopathic 
neuropathiesFollow up:Follow up 1 week after NS ayfafulwemhE97.11 Alcohol abuse
, in bukusljuyB86.5x9S Traumatic subdural hemorrhage with loss of consciousness 
of unspecified duration, tsppsnjR17.2 Degeneration of nervous system due to 
btzrywmI70.129D Central cord syndrome at unspecified level of cervical spinal 
cord, subsequent encounterReferral:Han Kingsley M.D., Surgery,
CvzgkdqkhukvN95.2 Paresthesia of skin



Functional Status







 Description

 

 No Information Available







Mental Status







 Description

 

 No Information Available







Referrals







 Description

 

 No Information Available

## 2020-04-23 NOTE — XMS REPORT
Continuity of Care Document (CCD)

 Created on:2020



Patient:Tanner Fisher

Sex:Male

:1955

External Reference #:[id number]





Demographics







 Address  [address]



   [place] [address]

 

 Mobile Phone  [phone]

 

 Work Phone  [phone]

 

 Email Address  [email or web address]

 

 Preferred Language  en

 

 Marital Status  Not  or 

 

 Buddhism Affiliation  Unknown

 

 Race  White

 

 Ethnic Group  Not  or 









Author







 Name  Lennox Gann M.D. (transmitted by agent of provider Tamara Cedeno)

 

 Address  [address]



   Unavailable



   [place] [address]









Care Team Providers







 Name  Role  Phone

 

 Sahil Jc MD -  Care Team Information   [phone]



 Otolaryngology    

 

 Joseph Busby MD - Urology  Care Team Information   [phone]

 

 Trell Grijalva MD - Orthopaedic  Care Team Information   [phone]
[phone]



 Surgery    

 

 Althea Rodriguez M.D. - Family Medicine  Care Team Information   [phone]
[phone]









Problems







 Active Problems  Provider  Date

 

 Mixed hyperlipidemia  Darwin Miranda M.D.,FACP  Onset: 2008

 

 Essential tremor  Darwin Miranda M.D.,FACP  Onset: 2008

 

 Essential hypertension  Ross Tejeda NP  Onset: 2016

 

 Alcohol-induced cerebellar ataxia  Darwin Miranda M.D.,FACP  Onset: 2018

 

 Chronic alcoholism in remission  Darwin Miranda M.D.,FACP  Onset: 2018

 

 Ex-smoker  Darwin Miranda M.D.,FACP  Onset: 2018

 

 Wernicke's disease  Saman May M.D.  Onset: 2018

 

 Spinal cord injury without spinal bone  Saman May M.D.  Onset: 2018



 injury    

 

 Static encephalopathy  Saman May M.D.  Onset: 2018

 

 Late effect of intracranial injury  Saman May M.D.  Onset: 2018



 without skull fracture    

 

 Idiopathic peripheral neuropathy  Saman May M.D.  Onset: 2018

 

 Nondependent alcohol abuse in  Saman May M.D.  Onset: 2018



 remission    

 

 Cervical spondylosis with myelopathy  Han Kingsley M.D.  Onset: 2018







Social History







 Type  Date  Description  Comments

 

 Birth Sex    Unknown  

 

 Tobacco Use  Start: Unknown End:  Former Cigarette Smoker  started age 24, quit



       age 54 for 8 years,



       then smoked again



       for one year total



       31 years 1 PPD

 

 Smoking Status  Reviewed: 19  Former Cigarette Smoker  started age 24, 
quit



       age 54 for 8 years,



       then smoked again



       for one year total



       31 years 1 PPD

 

 ETOH Use    Denies alcohol use  

 

 ETOH Use    consumes 5-6 beers per  



     week  

 

 Recreational Drug Use    Denies Drug Use  

 

 Tobacco Use  Start: Unknown End:  Patient is a former  quit 2009



   Unknown  smoker  







Allergies, Adverse Reactions, Alerts







 Active Allergies  Reaction  Severity  Comments  Date

 

 Celexa      worse depression  2008

 

 Lisinopril      cough  2008

 

 HCTZ      hyperglycemia  2008







Medications







 Active Medications  SIG  Qnty  Indications  Ordering  Date



         Provider  

 

 Shingrix  0.5 milliliters  1units  Z00.01  Althea Rodriguez MD  2019



         50mcg/0.5ML  intramuscular now        



 Suspension Rec  and 1-2 months later        



   repeat        

 

 Bupropion  Take 1 Tablet By  90tabs    Rosetta Conway,  2019



 Hydrochloride ER  Mouth Every Day      M.DOdalis  



 (SR)          



     100mg Tablets ER          



 12HR          



           

 

 Metoprolol Succinate  take 1 tablet by  90tabs  I10  Roxann Nolasco,  2016



 ER  mouth every day      M.DOdalis, FACP  



   50mg Tablets ER          



 24HR          



           

 

 Simvastatin  Take 1 Tablet By  90tabs    Cherelle  2013



            20mg  Mouth Every Day In      AMADOR Dennis  



 Tablets  The Evening        



           

 

 Omeprazole  take 1 capsule by  90caps    Althea Rodriguez MD  2011



           20mg  mouth every day        



 Capsules DR Avitia  take 1 tablet by  90tabs  I10  Cherelle  2011



       160mg Tablets  mouth every day      AMADOR Dennis  



           

 

 Fish Oil + D3  1 capsule daily      Unknown  



           



 1200-1000mg-Unit          



 Capsules          



           

 

 Saw Palmetto  1 po hs  120caps    Unknown  



             450mg          



 Capsules          



           

 

 Centrum Silver Ultra  1 po qd      Unknown  



 Mens          



      Tablets          



           

 

 Ascorbic Acid  1 by mouth every day      Unknown  



              500mg          



 Tablets          



           

 

 Magnesium Oxide  2 tab by mouth every      Unknown  



                400mg  day        



 Tablets          



           







Immunizations







 CPT Code  Status  Date  Vaccine  Reaction  Lot #

 

 [id number]  Given  2019  Pneumococcal Conjugate    [id number]



       Vaccine 13 Valent For    



       Intramuscular Use    

 

 [id number]  Given  01/15/2018  Influenza Virus Vaccine,  no immedite reaction  [id number]



       Quadrivalent, Split,  noted .. hh  



       Preservative Free    

 

 43936  Given  2016  Zoster (Zostavax)    [id number]

 

 79699  Given  2016  Influenza Virus Vaccine,    [id number]



       Quadrivalent, Split,    



       Preservative Free    

 

 65596  Given  2016  Tdap -    [id number]



       Tetanus/Diptheria/Acellular    



       Pertussis    

 

   Given  2012  Fluzone Vaccine    [id number]

 

 71111  Given  2011  Influenza Virus 3Yrs & Over    [id number]







Vital Signs







 Date  Vital  Result  Comment

 

 2019 11:20am  Height  65 inches  5'5"









 Weight  155.00 lb  shoes

 

 Heart Rate  76 /min  

 

 BP Systolic  148 mmHg  

 

 BP Diastolic  89 mmHg  

 

 Body Temperature  98.6 F  

 

 O2 % BldC Oximetry  98 %  

 

 BMI (Body Mass Index)  25.8 kg/m2  









 2018  9:06am  Height  65 inches  5'5"









 Weight  150.25 lb  

 

 Heart Rate  74 /min  

 

 BP Systolic Sitting  128 mmHg  

 

 BP Diastolic Sitting  72 mmHg  

 

 Pain Level  0  

 

 BMI (Body Mass Index)  25.0 kg/m2  







Results







 Test  Acquired Date  Facility  Test  Result  H/L  Range  Note

 

 Laboratory test  2020  [facility]  Ammonia  49 mcmol/L  
Normal  16-53  



 finding    [address] [place] [address] [phone]          









 Vitamin B1 (Whole Blood)  127 nmol/L      1









 Basic Metabolic  2020  [facility]  Sodium  137 mmol/L  Normal
  135-145  



 Panel    [address] [place] [address] [phone]          









 Potassium  4.1 mmol/L  Normal  3.5-5.0  

 

 Chloride  104 mmol/L  Normal  101-111  

 

 Co2 Carbon Dioxide  20 mmol/L  Low  22-32  

 

 Anion Gap  13 mmol/L  High  2-11  

 

 Glucose  83 mg/dL  Normal    

 

 Blood Urea Nitrogen  12 mg/dL  Normal  6-24  

 

 Creatinine  0.68 mg/dL  Normal  0.67-1.17  

 

 BUN/Creatinine Ratio  17.6  Normal  8-20  

 

 Calcium  7.9 mg/dL  Low  8.6-10.3  

 

 Egfr Non-  117.0    >60  

 

 Egfr   141.6    >60  2









 CBC Auto  2020  [facility]  White Blood  11.4 10^3/uL  High  
3.5-10.8  



 Diff    [address]  Count        



     [place] [address] [phone]          









 Red Blood Count  3.89 10^6/uL  Low  4.18-5.48  

 

 Hemoglobin  13.5 g/dL  Low  14.0-18.0  

 

 Hematocrit  40 %  Low  42-52  

 

 Mean Corpuscular Volume  102 fL  High  80-94  

 

 Mean Corpuscular Hemoglobin  35 pg  High  27-31  

 

 Mean Corpuscular HGB Conc  34 g/dL  Normal  31-36  

 

 Red Cell Distribution Width  14 %  Normal  10-15  

 

 Platelet Count  257 10^3/uL  Normal  150-450  

 

 Mean Platelet Volume  6.7 fL  Low  7.4-10.4  

 

 Abs Neutrophils  10.4 10^3/uL  High  1.5-7.7  

 

 Abs Lymphocytes  0.5 10^3/uL  Low  1.0-4.8  

 

 Abs Monocytes  0.5 10^3/uL  Normal  0-0.8  

 

 Abs Eosinophils  0.0 10^3/uL  Normal  0-0.6  

 

 Abs Basophils  0.0 10^3/uL  Normal  0-0.2  

 

 Abs Nucleated RBC  0.0 10^3/uL      

 

 Granulocyte %  90.9 %      

 

 Lymphocyte %  4.4 %      

 

 Monocyte %  4.5 %      

 

 Eosinophil %  0.0 %      

 

 Basophil %  0.2 %      

 

 Nucleated Red Blood Cells %  0.0      









 Comp Metabolic  2020  [facility]  Sodium  138 mmol/L  Normal  
135-145  



 Panel    [address]          



     [place] [address] [phone]          









 Potassium  3.9 mmol/L  Normal  3.5-5.0  

 

 Chloride  99 mmol/L  Low  101-111  

 

 Co2 Carbon Dioxide  20 mmol/L  Low  22-32  

 

 Anion Gap  19 mmol/L  High  2-11  

 

 Glucose  127 mg/dL  High    

 

 Blood Urea Nitrogen  12 mg/dL  Normal  6-24  

 

 Creatinine  0.76 mg/dL  Normal  0.67-1.17  

 

 BUN/Creatinine Ratio  15.8  Normal  8-20  

 

 Calcium  8.7 mg/dL  Normal  8.6-10.3  

 

 Total Protein  6.8 g/dL  Normal  6.4-8.9  

 

 Albumin  3.7 g/dL  Normal  3.2-5.2  

 

 Globulin  3.1 g/dL  Normal  2-4  

 

 Albumin/Globulin Ratio  1.2  Normal  1-3  

 

 Total Bilirubin  0.90 mg/dL  Normal  0.2-1.0  

 

 Alkaline Phosphatase  105 U/L  High    

 

 Alt  35 U/L  Normal  7-52  

 

 Ast  59 U/L  High  13-39  

 

 Egfr Non-  102.9    >60  

 

 Egfr   124.6    >60  3









 Laboratory test  2020  [facility]  Alcohol  < 10 mg/dL  
Normal  <10  



 finding    [address] [place] [address] [phone]          









 TSH (Thyroid Stim Horm)  0.54 mcIU/mL  Normal  0.34-5.60  

 

 Acetaminophen  < 15 g/mL      4

 

 Salicylate  < 2.50 mg/dL    <30  

 

 Osmolality Serum  294 mOsm/kg  Normal  275-295  









 1  -------------------ADDITIONAL INFORMATION-------------------



   This test was developed and its performance characteristics



   determined by [facility] in a manner consistent with CLIA



   requirements. This test has not been cleared or approved by



   the U.S. Food and Drug Administration.



   Test Performed by:



   [facility]



   [address] [place] [address]



   : Sancho Sifuentes M.D. Ph.D.; CLIA# [id number]

 

 2  *******Because ethnic data is not always readily available,



   this report includes an eGFR for both -Americans and



   non- Americans.****



   The National Kidney Disease Education Program (NKDEP) does



   not endorse the use of the MDRD equation for patients that



   are not between the ages of 18 and 70, are pregnant, have



   extremes of body size, muscle mass, or nutritional status,



   or are non- or non-.



   According to the National Kidney Foundation, irrespective of



   diagnosis, the stage of the disease is based on the level of



   kidney function:



   Stage Description                      GFR(mL/min/1.73 m(2))



   1     Kidney damage with normal or decreased GFR       90



   2     Kidney damage with mild decrease in GFR          60-89



   3     Moderate decrease in GFR                         30-59



   4     Severe decrease in GFR                           15-29



   5     Kidney failure                       <15 (or dialysis)

 

 3  *******Because ethnic data is not always readily available,



   this report includes an eGFR for both -Americans and



   non- Americans.****



   The National Kidney Disease Education Program (NKDEP) does



   not endorse the use of the MDRD equation for patients that



   are not between the ages of 18 and 70, are pregnant, have



   extremes of body size, muscle mass, or nutritional status,



   or are non- or non-.



   According to the National Kidney Foundation, irrespective of



   diagnosis, the stage of the disease is based on the level of



   kidney function:



   Stage Description                      GFR(mL/min/1.73 m(2))



   1     Kidney damage with normal or decreased GFR       90



   2     Kidney damage with mild decrease in GFR          60-89



   3     Moderate decrease in GFR                         30-59



   4     Severe decrease in GFR                           15-29



   5     Kidney failure                       <15 (or dialysis)

 

 4  Therapeutic concentration: <50 ug/mL



   Toxic concentration: >120 ug/mL







Procedures







 Date  Code  Description  Status

 

 2020  50462  ECHO Transthorasic Realtime 2D W Doppler & Color Flow  
Completed



     Hosp  

 

 2018  621421314  Diabetic Retinal Eye Exam  Completed

 

 2017  04977472  Colonoscopy  Completed

 

 2006  [id number]  Colonoscopy  Completed







Medical Devices







 Description

 

 No Information Available







Encounters







 Type  Date  Location  Provider  Dx  Diagnosis

 

 Office Visit  2020  [facility]  Karey Garrido,  M48.02  Spinal 
stenosis,



   2:06p  yogi Walsh DO    cervical region



     Hospitalists      









 R26.0  Ataxic gait









 Office Visit  2020  2:06p  [facility]  Deb  M48.02  Spinal



     Assocyogi PA-C    stenosis,



     Hospitalists      cervical region









 R26.0  Ataxic gait

 

 E83.42  Hypomagnesemia









 Office Visit  2020  [facility]  M48.02  Spinal



   2:05p  yogi Walsh M.D.    stenosis,



     Hospitalists      cervical region









 M50.021  Cervical disc disorder at C4-C5 level with myelopathy

 

 R26.0  Ataxic gait

 

 I10  Essential (primary) hypertension









 Office Visit  2020  [facility]  M48.02  Spinal



   2:05p  Assoc,yogi Gann M.D.    stenosis,



     Hospitalists      cervical region









 R26.0  Ataxic gait

 

 F10.21  Alcohol dependence, in remission

 

 I10  Essential (primary) hypertension









 Office Visit  03/15/2020  2:04p  [facility]  Lennox  R26.0  Ataxic gait



     Assoc,yogi Gann M.D.    



     Hospitalists      









 R53.1  Weakness

 

 R25.1  Tremor, unspecified

 

 I10  Essential (primary) hypertension









 Office Visit  2020  2:03p  [facility]  Gabby Ander,  R29.6  Repeated 
falls



     Assoc,pc  N.P.    



     Hospitalists      









 R26.0  Ataxic gait







Assessments







 Date  Code  Description  Provider

 

 2020  M48.02  Spinal stenosis, cervical region  Karey Senner, DO

 

 2020  M50.021  Cervical disc disorder at C4-C5 level  Karey Garrido, DO



     with myelopathy  

 

 2020  R26.0  Ataxic gait  Karey Senner, DO

 

 2020  R29.6  Repeated falls  Karey Senner, DO

 

 2020  M48.02  Spinal stenosis, cervical region  Karey Senner, DO

 

 2020  R26.0  Ataxic gait  Karey Senner, DO

 

 2020  M48.02  Spinal stenosis, cervical region  Deb Lerner PA-C

 

 2020  I10  Essential (primary) hypertension  Cailin Marks M.D.

 

 2020  R26.0  Ataxic gait  Deb Lerner PA-C

 

 2020  E83.42  Hypomagnesemia  Deb Lerner PA-C

 

 2020  M48.02  Spinal stenosis, cervical region  Lennox Gann M.D.

 

 2020  M50.021  Cervical disc disorder at C4-C5 level  Lennox Gann M.D.



     with myelopathy  

 

 2020  R26.0  Ataxic gait  Lennox Gann M.D.

 

 2020  I10  Essential (primary) hypertension  Lennox Gann M.D.

 

 2020  M48.02  Spinal stenosis, cervical region  Lennox Gann M.D.

 

 2020  R26.0  Ataxic gait  Lennox Gann M.D.

 

 2020  F10.21  Alcohol dependence, in remission  Lennox Gann M.D.

 

 2020  I10  Essential (primary) hypertension  Lennox Gann M.D.

 

 03/15/2020  R26.0  Ataxic gait  Lennox Gann M.D.

 

 03/15/2020  R53.1  Weakness  Lennox Gann M.D.

 

 03/15/2020  R25.1  Tremor, unspecified  Lennox Gann M.D.

 

 03/15/2020  I10  Essential (primary) hypertension  Lennox Gann M.D.

 

 2020  R29.6  Repeated falls  Gabby Worthington, N.P.

 

 2020  R26.0  Ataxic gait  Gabby Worthington, N.P.







Plan of Treatment

2018 - Saman May M.D.G60.8 Other hereditary and idiopathic 
neuropathiesFollow up:Follow up 1 week after NS urzewogiwytD80.11 Alcohol abuse
, in zjczkxhmoU57.5x9S Traumatic subdural hemorrhage with loss of consciousness 
of unspecified duration, khcxdxcE78.2 Degeneration of nervous system due to 
ozpznxtU62.129D Central cord syndrome at unspecified level of cervical spinal 
cord, subsequent encounterReferral:Han Kingsley M.D., Surgery,
KacvnfvpjyuxY93.2 Paresthesia of skin



Functional Status







 Description

 

 No Information Available







Mental Status







 Description

 

 No Information Available







Referrals







 Description

 

 No Information Available

## 2020-04-23 NOTE — XMS REPORT
Patient Summary Document

 Created on:2020



Patient:HUMA GARCÍA Unknown

Sex:Male

:1955

External Reference #:[id number]





Demographics







 Address  [address] [place] [address]

 

 Home Phone  [phone]

 

 Preferred Language  unk

 

 Marital Status  Unknown

 

 Protestant Affiliation  Unknown

 

 Race  Unknown

 

 Ethnic Group  Unknown









Author







 Organization  [facility]









Support







 Name  Relationship  Address  Phone

 

 LISA SCHROEDER, Unknown Name  Jane  Unknown Address  [phone]









Care Team Providers







 Name  Role  Phone

 

 Unavailable  Unavailable  Unavailable









Problems







 Condition  Condition  Condition  Status  Onset  Resolution  Last  Treating  
Comments



 Name  Details  Category    Date  Date  Treatment  Clinician  



             Date    

 

 Wernicke's  Wernicke's  Diagnosis  Active        Magan  



 encephalopa  encephalopa      3-23      Anguish  



 thy  thy            [id number]  

 

 Unspecified  Unspecified  Diagnosis  Active        Magan  



 cord  cord      3-23      Anguish  



 compression  compression            [id number]  

 

 Neuro  anxiety  Neuro/Emot  Active        Tegan  



   present  ion    3-24      (Fanta)  



         09:30:      Vann  



         00      [id number]  

 

 Neuro  depressive  Neuro/Emot  Active        Tegan  



   feelings  ion    3-24      (Fanta)  



   present      09:30:      Vann  



         00      [id number]  







Allergies, Adverse Reactions, Alerts







 Allergy Name  Allergy  Status  Severity  Reaction(s)  Onset  Inactive  
Treating  Comments



   Type        Date  Date  Clinician  

 

 citalopram  Unknown  Active  Unknown  Reaction      Tegan  



         Unknown  3-30    (Fanta)  



               Soo  



               [id number]  

 

 hydrochlorithia  Unknown  Active  Unknown  Reaction      Tegan  



 zide        Unknown  3-30    (Fanta)  



               Soo  



               [id number]  

 

 lisinopril  Unknown  Active  Unknown  Reaction      Tegan  



         Unknown  3-30    (Fanta)  



               Soo  



               [id number]  







Medications







 Ordered  Filled  Start  Stop  Current  Ordering  Indication  Dosage  Frequency
  Signature  Comments  Components



 Medication  Medication  Date  Date  Medication?  Clinician        (SIG)    



 Name  Name                    

 

 multivitami  multivitami      Yes  John    Unknown  Unknown      



 n capsule  n capsule  3-24      Apryl RODRIGUEZ            

 

 valsartan  valsartan  -0    Yes  John    Unknown  Unknown      



 160 mg  160 mg  3-24      Apryl RODRIGUEZ            



 tablet  tablet                    

 

 buPROPion  buPROPion  -0    Yes  John    Unknown  Unknown      



 HCL   HCL   3-24      Apryl RODRIGUEZ            



 mg  mg                    



 tablet,12  tablet,12                    



 hr  hr                    



 sustained-r  sustained-r                    



 elease  elease                    

 

 ascorbic  ascorbic  -0    Yes  John    Unknown  Unknown      



 acid  acid  3-24      Apryl RODRIGUEZ            



 (vitamin C)  (vitamin C)                    



 500 mg  500 mg                    



 capsule  capsule                    

 

 omeprazole  omeprazole  -0    Yes  John    Unknown  Unknown      



 20 mg  20 mg  3-24      MD,Apryl            



 tablet,marlena  tablet,marlena                    



 yed release  yed release                    

 

 omega  omega  2020-0    Yes  John    Unknown  Unknown      



 3-dha-epa-f  3-dha-epa-f  3-24      MD,Apryl            



 rosaura oil  rosaura oil                    



 1,000 mg  1,000 mg                    



 (120 mg-180  (120 mg-180                    



 mg) capsule  mg) capsule                    

 

 metoprolol  metoprolol  -0    Yes  John    Unknown  Unknown      



 succinate  succinate  3-24      MD,Apryl            



 ER 50 mg  ER 50 mg                    



 tablet,exte  tablet,exte                    



 nded  nded                    



 release 24  release 24                    



 hr  hr                    

 

 simvastatin  simvastatin  -0    Yes  John    Unknown  Unknown      



 20 mg  20 mg  3-24      MD,Apryl            



 tablet  tablet                    







Vital Signs







 Vital Name  Observation Time  Observation Value  Comments

 

 SYSTOLIC mm[Hg]  2020 18:11:04  128 mm[Hg] mm[Hg]  Method: Sit

 

 DIASTOLIC mm[Hg]  2020 18:11:04  64 mm[Hg] mm[Hg]  Method: Sit

 

 PULSE  2020 18:11:04  87 /min /min  

 

 RESP RATE  2020 18:11:04  15 /min /min  

 

 TEMP  2020 18:11:04  98.9 [degF]  







Procedures

This patient has no known procedures.



Results

This patient has no known results.

## 2020-04-23 NOTE — XMS REPORT
Continuity of Care Document (CCD)

 Created on:2020



Patient:Tanner Fisher

Sex:Male

:1955

External Reference #:[id number]





Demographics







 Address  [address]



   [place] [address]

 

 Mobile Phone  [phone]

 

 Work Phone  [phone]

 

 Email Address  [email or web address]

 

 Preferred Language  en

 

 Marital Status  Not  or 

 

 Synagogue Affiliation  Unknown

 

 Race  White

 

 Ethnic Group  Not  or 









Author







 Name  Cailin Marks M.D. (transmitted by agent of provider Ruthy Whitt)

 

 Address  [address] [place] [address]









Care Team Providers







 Name  Role  Phone

 

 Sahil Jc MD -  Care Team Information   [phone]



 Otolaryngology    

 

 Joseph Busby MD - Urology  Care Team Information   [phone]

 

 Trell Grijalva MD - Orthopaedic  Care Team Information   [phone]
[phone]



 Surgery    

 

 Althea Rodriguez M.D. - Family Medicine  Care Team Information   [phone]
[phone]









Problems







 Active Problems  Provider  Date

 

 Mixed hyperlipidemia  Darwin Miranda M.D.,FACP  Onset: 2008

 

 Essential tremor  Darwin Miranda M.D.,FACP  Onset: 2008

 

 Essential hypertension  Ross Tejeda NP  Onset: 2016

 

 Alcohol-induced cerebellar ataxia  Darwin Miranda M.D.,FACP  Onset: 2018

 

 Chronic alcoholism in remission  Darwin Miranda M.D.,FACP  Onset: 2018

 

 Ex-smoker  Darwin Miranda M.D.,FACP  Onset: 2018

 

 Wernicke's disease  Saman May M.D.  Onset: 2018

 

 Spinal cord injury without spinal bone  Saman May M.D.  Onset: 2018



 injury    

 

 Static encephalopathy  Saman May M.D.  Onset: 2018

 

 Late effect of intracranial injury  Saman May M.D.  Onset: 2018



 without skull fracture    

 

 Idiopathic peripheral neuropathy  Saman May M.D.  Onset: 2018

 

 Nondependent alcohol abuse in  Saman May M.D.  Onset: 2018



 remission    

 

 Cervical spondylosis with myelopathy  Han Kingsley M.D.  Onset: 2018







Social History







 Type  Date  Description  Comments

 

 Birth Sex    Unknown  

 

 Tobacco Use  Start: Unknown End:  Former Cigarette Smoker  started age 24, quit



       age 54 for 8 years,



       then smoked again



       for one year total



       31 years 1 PPD

 

 Smoking Status  Reviewed: 19  Former Cigarette Smoker  started age 24, 
quit



       age 54 for 8 years,



       then smoked again



       for one year total



       31 years 1 PPD

 

 ETOH Use    Denies alcohol use  

 

 ETOH Use    consumes 5-6 beers per  



     week  

 

 Recreational Drug Use    Denies Drug Use  

 

 Tobacco Use  Start: Unknown End:  Patient is a former  quit 2009



   Unknown  smoker  







Allergies, Adverse Reactions, Alerts







 Active Allergies  Reaction  Severity  Comments  Date

 

 Celexa      worse depression  2008

 

 Lisinopril      cough  2008

 

 HCTZ      hyperglycemia  2008







Medications







 Active Medications  SIG  Qnty  Indications  Ordering  Date



         Provider  

 

 Shingrix  0.5 milliliters  1units  Z00.01  Althea Rodriguez MD  2019



         50mcg/0.5ML  intramuscular now        



 Suspension Rec  and 1-2 months later        



   repeat        

 

 Bupropion  Take 1 Tablet By  90tabs    Rosetta [place],  2019



 Hydrochloride ER  Mouth Every Day      M.D.  



 (SR)          



     100mg Tablets ER          



 12HR          



           

 

 Metoprolol Succinate  take 1 tablet by  90tabs  I10  Roxann Nolasco,  2016



 ER  mouth every day      M.D., FACP  



   50mg Tablets ER          



 24HR          



           

 

 Simvastatin  Take 1 Tablet By  90tabs    Cherelle  2013



            20mg  Mouth Every Day In      AMADOR Dennis  



 Tablets  The Evening        



           

 

 Omeprazole  take 1 capsule by  90caps    Althea Rodriguez MD  2011



           20mg  mouth every day        



 Capsules DR Avitia  take 1 tablet by  90tabs  I10  Cherelle  2011



       160mg Tablets  mouth every day      AMADOR Dennis  



           

 

 Fish Oil + D3  1 capsule daily      Unknown  



           



 1200-1000mg-Unit          



 Capsules          



           

 

 Saw Palmetto  1 po hs  120caps    Unknown  



             450mg          



 Capsules          



           

 

 Centrum Silver Ultra  1 po qd      Unknown  



 Mens          



      Tablets          



           

 

 Ascorbic Acid  1 by mouth every day      Unknown  



              500mg          



 Tablets          



           

 

 Magnesium Oxide  2 tab by mouth every      Unknown  



                400mg  day        



 Tablets          



           







Immunizations







 CPT Code  Status  Date  Vaccine  Reaction  Lot #

 

 [id number]  Given  2019  Pneumococcal Conjugate    [id number]



       Vaccine 13 Valent For    



       Intramuscular Use    

 

 [id number]  Given  01/15/2018  Influenza Virus Vaccine,  no immedite reaction  [id number]



       Quadrivalent, Split,  noted .. hh  



       Preservative Free    

 

 42269  Given  2016  Zoster (Zostavax)    [id number]

 

 [id number]  Given  2016  Influenza Virus Vaccine,    [id number]



       Quadrivalent, Split,    



       Preservative Free    

 

 36109  Given  2016  Tdap -    [id number]



       Tetanus/Diptheria/Acellular    



       Pertussis    

 

   Given  2012  Fluzone Vaccine    [id number]

 

 59592  Given  2011  Influenza Virus 3Yrs & Over    [id number]







Vital Signs







 Date  Vital  Result  Comment

 

 2019 11:20am  Height  65 inches  5'5"









 Weight  155.00 lb  shoes

 

 Heart Rate  76 /min  

 

 BP Systolic  148 mmHg  

 

 BP Diastolic  89 mmHg  

 

 Body Temperature  98.6 F  

 

 O2 % BldC Oximetry  98 %  

 

 BMI (Body Mass Index)  25.8 kg/m2  









 2018  9:06am  Height  65 inches  5'5"









 Weight  150.25 lb  

 

 Heart Rate  74 /min  

 

 BP Systolic Sitting  128 mmHg  

 

 BP Diastolic Sitting  72 mmHg  

 

 Pain Level  0  

 

 BMI (Body Mass Index)  25.0 kg/m2  







Results







 Test  Acquired Date  Facility  Test  Result  H/L  Range  Note

 

 Laboratory test  2020  [facility]  Ammonia  49 mcmol/L  
Normal  16-53  



 finding    [address] [place] [address] [phone]          









 Vitamin B1 (Whole Blood)  127 nmol/L      1









 Basic Metabolic  2020  [facility]  Sodium  137 mmol/L  Normal
  135-145  



 Panel    [address] [place] [address] [phone]          









 Potassium  4.1 mmol/L  Normal  3.5-5.0  

 

 Chloride  104 mmol/L  Normal  101-111  

 

 Co2 Carbon Dioxide  20 mmol/L  Low  22-32  

 

 Anion Gap  13 mmol/L  High  2-11  

 

 Glucose  83 mg/dL  Normal    

 

 Blood Urea Nitrogen  12 mg/dL  Normal  6-24  

 

 Creatinine  0.68 mg/dL  Normal  0.67-1.17  

 

 BUN/Creatinine Ratio  17.6  Normal  8-20  

 

 Calcium  7.9 mg/dL  Low  8.6-10.3  

 

 Egfr Non-  117.0    >60  

 

 Egfr   141.6    >60  2









 CBC Auto  2020  [facility]  White Blood  11.4 10^3/uL  High  
3.5-10.8  



 Diff    [address]  Count        



     [place] [address] [phone]          









 Red Blood Count  3.89 10^6/uL  Low  4.18-5.48  

 

 Hemoglobin  13.5 g/dL  Low  14.0-18.0  

 

 Hematocrit  40 %  Low  42-52  

 

 Mean Corpuscular Volume  102 fL  High  80-94  

 

 Mean Corpuscular Hemoglobin  35 pg  High  27-31  

 

 Mean Corpuscular HGB Conc  34 g/dL  Normal  31-36  

 

 Red Cell Distribution Width  14 %  Normal  10-15  

 

 Platelet Count  257 10^3/uL  Normal  150-450  

 

 Mean Platelet Volume  6.7 fL  Low  7.4-10.4  

 

 Abs Neutrophils  10.4 10^3/uL  High  1.5-7.7  

 

 Abs Lymphocytes  0.5 10^3/uL  Low  1.0-4.8  

 

 Abs Monocytes  0.5 10^3/uL  Normal  0-0.8  

 

 Abs Eosinophils  0.0 10^3/uL  Normal  0-0.6  

 

 Abs Basophils  0.0 10^3/uL  Normal  0-0.2  

 

 Abs Nucleated RBC  0.0 10^3/uL      

 

 Granulocyte %  90.9 %      

 

 Lymphocyte %  4.4 %      

 

 Monocyte %  4.5 %      

 

 Eosinophil %  0.0 %      

 

 Basophil %  0.2 %      

 

 Nucleated Red Blood Cells %  0.0      









 Comp Metabolic  2020  [facility]  Sodium  138 mmol/L  Normal  
135-145  



 Panel    [address]          



     [place] [address] [phone]          









 Potassium  3.9 mmol/L  Normal  3.5-5.0  

 

 Chloride  99 mmol/L  Low  101-111  

 

 Co2 Carbon Dioxide  20 mmol/L  Low  22-32  

 

 Anion Gap  19 mmol/L  High  2-11  

 

 Glucose  127 mg/dL  High    

 

 Blood Urea Nitrogen  12 mg/dL  Normal  6-24  

 

 Creatinine  0.76 mg/dL  Normal  0.67-1.17  

 

 BUN/Creatinine Ratio  15.8  Normal  8-20  

 

 Calcium  8.7 mg/dL  Normal  8.6-10.3  

 

 Total Protein  6.8 g/dL  Normal  6.4-8.9  

 

 Albumin  3.7 g/dL  Normal  3.2-5.2  

 

 Globulin  3.1 g/dL  Normal  2-4  

 

 Albumin/Globulin Ratio  1.2  Normal  1-3  

 

 Total Bilirubin  0.90 mg/dL  Normal  0.2-1.0  

 

 Alkaline Phosphatase  105 U/L  High    

 

 Alt  35 U/L  Normal  7-52  

 

 Ast  59 U/L  High  13-39  

 

 Egfr Non-  102.9    >60  

 

 Egfr   124.6    >60  3









 Laboratory test  2020  [facility]  Alcohol  < 10 mg/dL  
Normal  <10  



 finding    [address] [place] [address] [phone]          









 TSH (Thyroid Stim Horm)  0.54 mcIU/mL  Normal  0.34-5.60  

 

 Acetaminophen  < 15 g/mL      4

 

 Salicylate  < 2.50 mg/dL    <30  

 

 Osmolality Serum  294 mOsm/kg  Normal  275-295  









 1  -------------------ADDITIONAL INFORMATION-------------------



   This test was developed and its performance characteristics



   determined by [facility] in a manner consistent with CLIA



   requirements. This test has not been cleared or approved by



   the U.S. Food and Drug Administration.



   Test Performed by:



   [facility]



   [address] [place] [address]



   : Sancho Sifuentes M.D. Ph.D.; CLIA# [id number]

 

 2  *******Because ethnic data is not always readily available,



   this report includes an eGFR for both -Americans and



   non- Americans.****



   The National Kidney Disease Education Program (NKDEP) does



   not endorse the use of the MDRD equation for patients that



   are not between the ages of 18 and 70, are pregnant, have



   extremes of body size, muscle mass, or nutritional status,



   or are non- or non-.



   According to the National Kidney Foundation, irrespective of



   diagnosis, the stage of the disease is based on the level of



   kidney function:



   Stage Description                      GFR(mL/min/1.73 m(2))



   1     Kidney damage with normal or decreased GFR       90



   2     Kidney damage with mild decrease in GFR          60-89



   3     Moderate decrease in GFR                         30-59



   4     Severe decrease in GFR                           15-29



   5     Kidney failure                       <15 (or dialysis)

 

 3  *******Because ethnic data is not always readily available,



   this report includes an eGFR for both -Americans and



   non- Americans.****



   The National Kidney Disease Education Program (NKDEP) does



   not endorse the use of the MDRD equation for patients that



   are not between the ages of 18 and 70, are pregnant, have



   extremes of body size, muscle mass, or nutritional status,



   or are non- or non-.



   According to the National Kidney Foundation, irrespective of



   diagnosis, the stage of the disease is based on the level of



   kidney function:



   Stage Description                      GFR(mL/min/1.73 m(2))



   1     Kidney damage with normal or decreased GFR       90



   2     Kidney damage with mild decrease in GFR          60-89



   3     Moderate decrease in GFR                         30-59



   4     Severe decrease in GFR                           15-29



   5     Kidney failure                       <15 (or dialysis)

 

 4  Therapeutic concentration: <50 ug/mL



   Toxic concentration: >120 ug/mL







Procedures







 Date  Code  Description  Status

 

 2020  39325  ECHO Transthorasic Realtime 2D W Doppler & Color Flow  
Completed



     Hosp  

 

 2018  448802267  Diabetic Retinal Eye Exam  Completed

 

 2017  91174556  Colonoscopy  Completed

 

 2006  [id number]  Colonoscopy  Completed







Medical Devices







 Description

 

 No Information Available







Encounters







 Description

 

 No Information Available







Assessments







 Date  Code  Description  Provider

 

 2020  I10  Essential (primary) hypertension  Cailin Marks M.D.







Plan of Treatment

2018 - Saman May M.D.G60.8 Other hereditary and idiopathic 
neuropathiesFollow up:Follow up 1 week after NS [id number] Alcohol abuse
, in [id number] Traumatic subdural hemorrhage with loss of consciousness 
of unspecified duration, wdfwzuvD52.2 Degeneration of nervous system due to 
[id number] Central cord syndrome at unspecified level of cervical spinal 
cord, subsequent encounterReferral:Hna Kingsley M.D., Surgery,
PfjpmtbffrbaU17.2 Paresthesia of skin



Functional Status







 Description

 

 No Information Available







Mental Status







 Description

 

 No Information Available







Referrals







 Description

 

 No Information Available

## 2020-04-23 NOTE — XMS REPORT
Continuity of Care Document (CCD)

 Created on:2020



Patient:Tanner Fisher

Sex:Male

:1955

External Reference #:[id number]





Demographics







 Address  [address]



   [place] [address]

 

 Mobile Phone  [phone]

 

 Work Phone  [phone]

 

 Email Address  [email or web address]

 

 Preferred Language  en

 

 Marital Status  Not  or 

 

 Gnosticism Affiliation  Unknown

 

 Race  White

 

 Ethnic Group  Not  or 









Author







 Name  Andie Rodriguez MD (transmitted by agent of provider )

 

 Address  [address]



   [place] [address]









Care Team Providers







 Name  Role  Phone

 

 Sahil Jc MD -  Care Team Information   [phone]



 Otolaryngology    

 

 Joseph Busby MD - Urology  Care Team Information   [phone]

 

 Trell Grijalva MD - Orthopaedic  Care Team Information   [phone]
[phone]



 Surgery    

 

 Althea Rodriguez M.D. - Family Medicine  Care Team Information   [phone]
[phone]









Problems







 Active Problems  Provider  Date

 

 Mixed hyperlipidemia  Darwin Miranda M.D.,FACP  Onset: 2008

 

 Essential tremor  Darwin Miranda M.D.,FACP  Onset: 2008

 

 Essential hypertension  Ross Tejeda NP  Onset: 2016

 

 Alcohol-induced cerebellar ataxia  Darwin Miranda M.D.,FACP  Onset: 2018

 

 Chronic alcoholism in remission  Darwin Miranda M.D.,FACP  Onset: 2018

 

 Ex-smoker  Darwin Miranda M.D.,FACP  Onset: 2018

 

 Wernicke's disease  Saman May M.D.  Onset: 2018

 

 Spinal cord injury without spinal bone  Saman May M.D.  Onset: 2018



 injury    

 

 Static encephalopathy  Saman May M.D.  Onset: 2018

 

 Late effect of intracranial injury  Saman May M.D.  Onset: 2018



 without skull fracture    

 

 Idiopathic peripheral neuropathy  Saman May M.D.  Onset: 2018

 

 Nondependent alcohol abuse in  Saman May M.D.  Onset: 2018



 remission    

 

 Cervical spondylosis with myelopathy  Han Kingsley M.D.  Onset: 2018







Social History







 Type  Date  Description  Comments

 

 Birth Sex    Unknown  

 

 Tobacco Use  Start: Unknown End:  Former Cigarette Smoker  started age 24, quit



       age 54 for 8 years,



       then smoked again



       for one year total



       31 years 1 PPD

 

 Smoking Status  Reviewed: 19  Former Cigarette Smoker  started age 24, 
quit



       age 54 for 8 years,



       then smoked again



       for one year total



       31 years 1 PPD

 

 ETOH Use    Denies alcohol use  

 

 ETOH Use    consumes 5-6 beers per  



     week  

 

 Recreational Drug Use    Denies Drug Use  

 

 Tobacco Use  Start: Unknown End:  Patient is a former  quit 2009



   Unknown  smoker  







Allergies, Adverse Reactions, Alerts







 Active Allergies  Reaction  Severity  Comments  Date

 

 Celexa      worse depression  2008

 

 Lisinopril      cough  2008

 

 HCTZ      hyperglycemia  2008







Medications







 Active Medications  SIG  Qnty  Indications  Ordering  Date



         Provider  

 

 Shingrix  0.5 milliliters  1units  Z00.01  Althea Rodriguez MD  2019



         50mcg/0.5ML  intramuscular now        



 Suspension Rec  and 1-2 months later        



   repeat        

 

 Bupropion  Take 1 Tablet By  90tabs    Rosetta Conway,  2019



 Hydrochloride ER  Mouth Every Day      M.DOdalis  



 (SR)          



     100mg Tablets ER          



 12HR          



           

 

 Metoprolol Succinate  take 1 tablet by  90tabs  I10  Roxann Nolasco,  2016



 ER  mouth every day      M.DOdalis, FACP  



   50mg Tablets ER          



 24HR          



           

 

 Simvastatin  Take 1 Tablet By  90tabs    Cherelle  2013



            20mg  Mouth Every Day In      AMADOR Dennis  



 Tablets  The Evening        



           

 

 Omeprazole  take 1 capsule by  90caps    Althea Rodriguez MD  2011



           20mg  mouth every day        



 Capsules DR Avitia  take 1 tablet by  90tabs  I10  Cherelle  2011



       160mg Tablets  mouth every day      AMADOR Dennis  



           

 

 Fish Oil + D3  1 capsule daily      Unknown  



           



 1200-1000mg-Unit          



 Capsules          



           

 

 Saw Palmetto  1 po hs  120caps    Unknown  



             450mg          



 Capsules          



           

 

 Centrum Silver Ultra  1 po qd      Unknown  



 Mens          



      Tablets          



           

 

 Ascorbic Acid  1 by mouth every day      Unknown  



              500mg          



 Tablets          



           

 

 Magnesium Oxide  2 tab by mouth every      Unknown  



                400mg  day        



 Tablets          



           







Immunizations







 CPT Code  Status  Date  Vaccine  Reaction  Lot #

 

 [id number]  Given  2019  Pneumococcal Conjugate    [id number]



       Vaccine 13 Valent For    



       Intramuscular Use    

 

 49538  Given  01/15/2018  Influenza Virus Vaccine,  no immedite reaction  [id number]



       Quadrivalent, Split,  noted .. hh  



       Preservative Free    

 

 06851  Given  2016  Zoster (Zostavax)    [id number]

 

 93814  Given  2016  Influenza Virus Vaccine,    [id number]



       Quadrivalent, Split,    



       Preservative Free    

 

 87008  Given  2016  Tdap -    [id number]



       Tetanus/Diptheria/Acellular    



       Pertussis    

 

   Given  2012  Fluzone Vaccine    [id number]

 

 12045  Given  2011  Influenza Virus 3Yrs & Over    [id number]







Vital Signs







 Date  Vital  Result  Comment

 

 2019 11:20am  Height  65 inches  5'5"









 Weight  155.00 lb  shoes

 

 Heart Rate  76 /min  

 

 BP Systolic  148 mmHg  

 

 BP Diastolic  89 mmHg  

 

 Body Temperature  98.6 F  

 

 O2 % BldC Oximetry  98 %  

 

 BMI (Body Mass Index)  25.8 kg/m2  









 2018  9:06am  Height  65 inches  5'5"









 Weight  150.25 lb  

 

 Heart Rate  74 /min  

 

 BP Systolic Sitting  128 mmHg  

 

 BP Diastolic Sitting  72 mmHg  

 

 Pain Level  0  

 

 BMI (Body Mass Index)  25.0 kg/m2  







Results







 Test  Acquired Date  Facility  Test  Result  H/L  Range  Note

 

 Laboratory test  2020  [facility]  Ammonia  49 mcmol/L  
Normal  16-53  



 finding    [address] [place] [address] [phone]          









 Vitamin B1 (Whole Blood)  127 nmol/L      1









 Basic Metabolic  2020  [facility]  Sodium  137 mmol/L  Normal
  135-145  



 Panel    [address] [place] [address] [phone]          









 Potassium  4.1 mmol/L  Normal  3.5-5.0  

 

 Chloride  104 mmol/L  Normal  101-111  

 

 Co2 Carbon Dioxide  20 mmol/L  Low  22-32  

 

 Anion Gap  13 mmol/L  High  2-11  

 

 Glucose  83 mg/dL  Normal    

 

 Blood Urea Nitrogen  12 mg/dL  Normal  6-24  

 

 Creatinine  0.68 mg/dL  Normal  0.67-1.17  

 

 BUN/Creatinine Ratio  17.6  Normal  8-20  

 

 Calcium  7.9 mg/dL  Low  8.6-10.3  

 

 Egfr Non-  117.0    >60  

 

 Egfr   141.6    >60  2









 CBC Auto  2020  [facility]  White Blood  11.4 10^3/uL  High  
3.5-10.8  



 Diff    [address]  Count        



     [place] [address] [phone]          









 Red Blood Count  3.89 10^6/uL  Low  4.18-5.48  

 

 Hemoglobin  13.5 g/dL  Low  14.0-18.0  

 

 Hematocrit  40 %  Low  42-52  

 

 Mean Corpuscular Volume  102 fL  High  80-94  

 

 Mean Corpuscular Hemoglobin  35 pg  High  27-31  

 

 Mean Corpuscular HGB Conc  34 g/dL  Normal  31-36  

 

 Red Cell Distribution Width  14 %  Normal  10-15  

 

 Platelet Count  257 10^3/uL  Normal  150-450  

 

 Mean Platelet Volume  6.7 fL  Low  7.4-10.4  

 

 Abs Neutrophils  10.4 10^3/uL  High  1.5-7.7  

 

 Abs Lymphocytes  0.5 10^3/uL  Low  1.0-4.8  

 

 Abs Monocytes  0.5 10^3/uL  Normal  0-0.8  

 

 Abs Eosinophils  0.0 10^3/uL  Normal  0-0.6  

 

 Abs Basophils  0.0 10^3/uL  Normal  0-0.2  

 

 Abs Nucleated RBC  0.0 10^3/uL      

 

 Granulocyte %  90.9 %      

 

 Lymphocyte %  4.4 %      

 

 Monocyte %  4.5 %      

 

 Eosinophil %  0.0 %      

 

 Basophil %  0.2 %      

 

 Nucleated Red Blood Cells %  0.0      









 Comp Metabolic  2020  [facility]  Sodium  138 mmol/L  Normal  
135-145  



 Panel    [address]          



     [place] [address] [phone]          









 Potassium  3.9 mmol/L  Normal  3.5-5.0  

 

 Chloride  99 mmol/L  Low  101-111  

 

 Co2 Carbon Dioxide  20 mmol/L  Low  22-32  

 

 Anion Gap  19 mmol/L  High  2-11  

 

 Glucose  127 mg/dL  High    

 

 Blood Urea Nitrogen  12 mg/dL  Normal  6-24  

 

 Creatinine  0.76 mg/dL  Normal  0.67-1.17  

 

 BUN/Creatinine Ratio  15.8  Normal  8-20  

 

 Calcium  8.7 mg/dL  Normal  8.6-10.3  

 

 Total Protein  6.8 g/dL  Normal  6.4-8.9  

 

 Albumin  3.7 g/dL  Normal  3.2-5.2  

 

 Globulin  3.1 g/dL  Normal  2-4  

 

 Albumin/Globulin Ratio  1.2  Normal  1-3  

 

 Total Bilirubin  0.90 mg/dL  Normal  0.2-1.0  

 

 Alkaline Phosphatase  105 U/L  High    

 

 Alt  35 U/L  Normal  7-52  

 

 Ast  59 U/L  High  13-39  

 

 Egfr Non-  102.9    >60  

 

 Egfr   124.6    >60  3









 Laboratory test  2020  [facility]  Alcohol  < 10 mg/dL  
Normal  <10  



 finding    [address] [place] [address] [phone]          









 TSH (Thyroid Stim Horm)  0.54 mcIU/mL  Normal  0.34-5.60  

 

 Acetaminophen  < 15 g/mL      4

 

 Salicylate  < 2.50 mg/dL    <30  

 

 Osmolality Serum  294 mOsm/kg  Normal  275-295  









 1  -------------------ADDITIONAL INFORMATION-------------------



   This test was developed and its performance characteristics



   determined by [facility] in a manner consistent with CLIA



   requirements. This test has not been cleared or approved by



   the U.S. Food and Drug Administration.



   Test Performed by:



   [facility]



   [address] [place] [address]



   : Sancho Sifuentes M.D. Ph.D.; CLIA# [id number]

 

 2  *******Because ethnic data is not always readily available,



   this report includes an eGFR for both -Americans and



   non- Americans.****



   The National Kidney Disease Education Program (NKDEP) does



   not endorse the use of the MDRD equation for patients that



   are not between the ages of 18 and 70, are pregnant, have



   extremes of body size, muscle mass, or nutritional status,



   or are non- or non-.



   According to the National Kidney Foundation, irrespective of



   diagnosis, the stage of the disease is based on the level of



   kidney function:



   Stage Description                      GFR(mL/min/1.73 m(2))



   1     Kidney damage with normal or decreased GFR       90



   2     Kidney damage with mild decrease in GFR          60-89



   3     Moderate decrease in GFR                         30-59



   4     Severe decrease in GFR                           15-29



   5     Kidney failure                       <15 (or dialysis)

 

 3  *******Because ethnic data is not always readily available,



   this report includes an eGFR for both -Americans and



   non- Americans.****



   The National Kidney Disease Education Program (NKDEP) does



   not endorse the use of the MDRD equation for patients that



   are not between the ages of 18 and 70, are pregnant, have



   extremes of body size, muscle mass, or nutritional status,



   or are non- or non-.



   According to the National Kidney Foundation, irrespective of



   diagnosis, the stage of the disease is based on the level of



   kidney function:



   Stage Description                      GFR(mL/min/1.73 m(2))



   1     Kidney damage with normal or decreased GFR       90



   2     Kidney damage with mild decrease in GFR          60-89



   3     Moderate decrease in GFR                         30-59



   4     Severe decrease in GFR                           15-29



   5     Kidney failure                       <15 (or dialysis)

 

 4  Therapeutic concentration: <50 ug/mL



   Toxic concentration: >120 ug/mL







Procedures







 Date  Code  Description  Status

 

 2020  04220  ECHO Transthorasic Realtime 2D W Doppler & Color Flow  
Completed



     Hosp  

 

 2018  836473934  Diabetic Retinal Eye Exam  Completed

 

 2017  43472215  Colonoscopy  Completed

 

 2006  76479039  Colonoscopy  Completed







[employer] Devices







 Description

 

 No Information Available







Encounters







 Type  Date  Location  Provider  Dx  Diagnosis

 

 Office Visit  2020  [facility]  Gaurav Romero,  M48.02  Spinal 
stenosis,



   10:00a  Medicine - [facility] ENGLISH    cervical region









 I10  Essential (primary) hypertension

 

 F10.21  Alcohol dependence, in remission









 Office Visit  2020  2:07p  [facility]  Karey  M48.02  Spinal



     Assoc,yogi Garrido, DO    stenosis,



     Hospitalists      cervical region









 M50.021  Cervical disc disorder at C4-C5 level with myelopathy

 

 R26.0  Ataxic gait

 

 R29.6  Repeated falls









 Office Visit  2020  2:06p  [facility]  Karey  M48.02  Spinal



     Assoc,yogi Garrido, DO    stenosis,



     Hospitalists      cervical region









 R26.0  Ataxic gait









 Office Visit  2020  Neurosurgery  Vassilios  M47.12  Other



   7:00a  [facility] MD    spondylosis with



           myelopathy,



           cervical region









 M48.02  Spinal stenosis, cervical region









 Office Visit  2020  2:06p  [facility]  Deb  M48.02  Spinal



     Assoc,yogi Lerner PA-C    stenosis,



     Hospitalists      cervical region









 R26.0  Ataxic gait

 

 E83.42  Hypomagnesemia









 Office Visit  2020  Neurosurgery  Vassilios  M47.12  Other



   7:00a  [facility] MD    spondylosis with



           myelopathy,



           cervical region









 M48.02  Spinal stenosis, cervical region









 Office Visit  2020  [facility]  M48.02  Spinal



   2:05p  Assyogi starr M.D.    stenosis,



     Hospitalists      cervical region









 M50.021  Cervical disc disorder at C4-C5 level with myelopathy

 

 R26.0  Ataxic gait

 

 I10  Essential (primary) hypertension









 Office Visit  2020  [facility]  M48.02  Spinal



   2:05p  Assyogi starr M.D.    stenosis,



     Hospitalists      cervical region









 R26.0  Ataxic gait

 

 F10.21  Alcohol dependence, in remission

 

 I10  Essential (primary) hypertension









 Office Visit  03/15/2020  2:04p  [facility]  R26.0  Ataxic gait



     Assoc,yogi Gann M.D.    



     Hospitalists      









 R53.1  Weakness

 

 R25.1  Tremor, unspecified

 

 I10  Essential (primary) hypertension









 Office Visit  2020  2:03p  [facility]  Gabby Worthington,  R29.6  Repeated 
falls



     Assoc,pc  N.POdalis    



     Hospitalists      









 R26.0  Ataxic gait







Assessments







 Date  Code  Description  Provider

 

 2020  M48.02  Spinal stenosis, cervical region  Gaurav Romero M.D.

 

 2020  I10  Essential (primary) hypertension  Gaurav Romero M.D.

 

 2020  F10.21  Alcohol dependence, in remission  Gaurav Romero M.D.

 

 2020  M48.02  Spinal stenosis, cervical region  Karey Garrido, DO

 

 2020  M50.021  Cervical disc disorder at C4-C5 level  Karey Garrido, 



     with myelopathy  

 

 2020  R26.0  Ataxic gait  Karey Garrido, DO

 

 2020  R29.6  Repeated falls  Karey Hema, DO

 

 2020  M48.02  Spinal stenosis, cervical region  Karey Garrido, DO

 

 2020  R26.0  Ataxic gait  Karey Garrido, DO

 

 2020  M47.12  Other spondylosis with myelopathy,  Andie Rodriguez MD



     cervical region  

 

 2020  M48.02  Spinal stenosis, cervical region  KELLEY CarsonC

 

 2020  M48.02  Spinal stenosis, cervical region  Andie Rodriguez MD

 

 2020  I10  Essential (primary) hypertension  Cailin Marks M.D.

 

 2020  R26.0  Ataxic gait  Deb Lerner PA-C

 

 2020  E83.42  Hypomagnesemia  KELLEY CarsonC

 

 2020  M47.12  Other spondylosis with myelopathy,  Andie Rodriguez MD



     cervical region  

 

 2020  M48.02  Spinal stenosis, cervical region  Lennox Gann M.D.

 

 2020  M48.02  Spinal stenosis, cervical region  Andie Rodriguez MD

 

 2020  M50.021  Cervical disc disorder at C4-C5 level  Lennox Gann M.D.



     with myelopathy  

 

 2020  R26.0  Ataxic gait  Lennox Gann M.D.

 

 2020  I10  Essential (primary) hypertension  Lennox Gann M.D.

 

 2020  M48.02  Spinal stenosis, cervical region  Lennox Gann M.D.

 

 2020  R26.0  Ataxic gait  Lennox Gann M.D.

 

 2020  F10.21  Alcohol dependence, in remission  Lennox Gann M.D.

 

 2020  I10  Essential (primary) hypertension  Lennox Gann M.D.

 

 03/15/2020  R26.0  Ataxic gait  Lennox Gann M.D.

 

 03/15/2020  R53.1  Weakness  Lennox Gann M.D.

 

 03/15/2020  R25.1  Tremor, unspecified  Lennox Gann M.D.

 

 03/15/2020  I10  Essential (primary) hypertension  Lennox Gann M.D.

 

 2020  R29.6  Repeated falls  Gabby Worthington N.P.

 

 2020  R26.0  Ataxic gait  Gabby Worthington N.P.







Plan of Treatment

2020 - Gaurav Romero M.D.M48.02 Spinal stenosis, cervical 
regionComments:Increased weakness and tingling symptoms prompting his recent 
admission.  Neurosurgical evaluation resulted in a recommendation for cervical 
spine surgery for his severe spinal canal stenosis.  Patientdeclined surgical 
intervention at that time and is interested in getting a second opinion.  No 
acutechanges noted but patient advised to proceed with the second opinion as 
soon as possible given the serious findings on his scan.  Patient also advised 
to contact [facility] radiology to get a copy of his MRI scans on disc for his planned 
neurosurgical consult.I10 Essential (primary) hypertensionComments:No BP 
problems in the hospital per patient.  Occasional home blood pressure checks 
advised.F10.21 Alcohol dependence, in remissionComments:(+) Past history of 
alcohol abuse with ? encephalopathy in 2018.  Patient denies heavy intake now 
but admits he is still drinking daily.



Functional Status







 Description

 

 No Information Available







Mental Status







 Description

 

 No Information Available







Referrals







 Description

 

 No Information Available

## 2020-04-23 NOTE — XMS REPORT
Patient Summary Document

 Created on:2020



Patient:HUMA GARCÍA Unknown

Sex:Male

:1955

External Reference #:[id number]





Demographics







 Address  [address] [place] [address]

 

 Home Phone  [phone]

 

 Preferred Language  unk

 

 Marital Status  Unknown

 

 [employer] Affiliation  Unknown

 

 Race  Unknown

 

 Ethnic Group  Unknown









Author







 Organization  [facility]









Support







 Name  Relationship  Address  Phone

 

 LISA SCHROEDER, Unknown Name  Jane  Unknown Address  [phone]









Care Team Providers







 Name  Role  Phone

 

 Unavailable  Unavailable  Unavailable









Problems







 Condition  Condition  Condition  Status  Onset  Resolution  Last  Treating  
Comments



 Name  Details  Category    Date  Date  Treatment  Clinician  



             Date    

 

 Wernicke's  Wernicke's  Diagnosis  Active        Eimly  



 encephalopa  encephalopa      3-23      Montez  



 thy  thy            [id number]  

 

 Unspecified  Unspecified  Diagnosis  Active        Emily  



 cord  cord      3-23      Montez  



 compression  compression            [id number]  







Allergies, Adverse Reactions, Alerts







 Allergy Name  Allergy  Status  Severity  Reaction(s)  Onset  Inactive  
Treating  Comments



   Type        Date  Date  Clinician  

 

 citalopram  Unknown  Active  Unknown  Reaction      Tegan  



         Unknown  3-30    (Fanta)  



               Soo  



               [id number]  

 

 hydrochlorithia  Unknown  Active  Unknown  Reaction      Tegan  



 zide        Unknown  3-30    (Fanta)  



               Soo  



               [id number]  

 

 lisinopril  Unknown  Active  Unknown  Reaction      Tegan  



         Unknown  3-30    (Fanta)  



               Soo  



               [id number]  







Medications







 Ordered  Filled  Start  Stop  Current  Ordering  Indication  Dosage  Frequency
  Signature  Comments  Components



 Medication  Medication  Date  Date  Medication?  Clinician        (SIG)    



 Name  Name                    

 

 No Known  No Known      No  None    None  None      



 Medications  Medications                    



 For This  For This                    



 Patient  Patient                    







Procedures

This patient has no known procedures.



Results

This patient has no known results.

## 2022-01-01 NOTE — ED
Zach MARX Stephanie, scribed for Monica Contreras MD on 03/08/18 at 0522 .





Progress





- Progress Note


Progress Note: 


Xray L shoulder: no fracture. Bilateral Knee XRay: no fracture. R clavicle XRay

: no fracture. Pelvis XRay: no fracture. CXR reveals 22 mm *15 mm mass in the 

left lower lobe. Pending official reading. 





- Results/Orders


Results/Orders: 


CT Maxillofacial reveals:Mildly depressed acute appearing nasal bridge 

fracture. Questionable old left maxillary sinus fracture. Dense left maxillary 

sinus contents could represent blood from trauma, or chronic or fungal 

sinusitis.











Re-Evaluation





- Re-Evaluation


  ** First Eval


Change: Unchanged - no change  in assessment, patient is in CT scan, case 

reviewed with Dr. Contreras





  ** Second Eval


Change: Unchanged - Patient at 30 in bed complaining of left shoulder pain 

Zofran and 2 mg morphine given report given to Dr. contreras





Course/Dx





- Course


Course Of Treatment: Procedure note:  Lower lip laceration repair:Pt has 3 cm 

long 1.5 cm wide lac at the middle of the mucosa of the lower lip extending 

from the vrmilian boarder to the gum line. Repair using lidocain 2 % with epi. 

The laceration was closed in two layers. Inner layer was closed with 4 

stitches. The outer layer was closed with 4 stitches. We did use Polizorb 4-0 

and 3-0. There was a good alignment and hemostasis.  Forehead laceration: about 

3 cm lac verticle in the middle of the forehead.  Repair: Closed in 2 layers. 

Inner layer closed using polizorb 3-0. Outer layer closed using proline 4-0. 

Inner layer had 3 stitches. Outerlayer had 6 stitches. Good alignment and 

hemostasis.  ED physician reviewed the pt's his normal xray results. ED 

physician explained finding of CXR which showed a mass according to the 

reading. The pt was advised to folow up with his PCP to address the lung mass 

and to make sure to rule out malignancy. The pt understands and agrees. 

Disposition: discharge, Condition: Stable





- Diagnoses


Provider Diagnoses: 


 Nasal fracture, Trauma, Lip laceration, Forehead laceration, Multiple 

contusions, Lung mass








The documentation as recorded by the Zach cervantes Stephanie accurately reflects 

the service I personally performed and the decisions made by Ben rmaos Abdul, MD. DC instructions
